# Patient Record
Sex: MALE | Race: WHITE
[De-identification: names, ages, dates, MRNs, and addresses within clinical notes are randomized per-mention and may not be internally consistent; named-entity substitution may affect disease eponyms.]

---

## 2020-03-12 ENCOUNTER — HOSPITAL ENCOUNTER (INPATIENT)
Dept: HOSPITAL 46 - ED | Age: 85
LOS: 5 days | Discharge: NURSING, REHAB OR LONG-TERM CARE | DRG: 291 | End: 2020-03-17
Attending: INTERNAL MEDICINE | Admitting: INTERNAL MEDICINE
Payer: MEDICARE

## 2020-03-12 VITALS — HEIGHT: 67 IN | WEIGHT: 186 LBS | BODY MASS INDEX: 29.19 KG/M2

## 2020-03-12 DIAGNOSIS — G47.33: ICD-10-CM

## 2020-03-12 DIAGNOSIS — G93.41: ICD-10-CM

## 2020-03-12 DIAGNOSIS — Z66: ICD-10-CM

## 2020-03-12 DIAGNOSIS — K59.00: ICD-10-CM

## 2020-03-12 DIAGNOSIS — J96.91: ICD-10-CM

## 2020-03-12 DIAGNOSIS — Z79.01: ICD-10-CM

## 2020-03-12 DIAGNOSIS — I48.19: ICD-10-CM

## 2020-03-12 DIAGNOSIS — E03.9: ICD-10-CM

## 2020-03-12 DIAGNOSIS — Z79.899: ICD-10-CM

## 2020-03-12 DIAGNOSIS — I50.33: Primary | ICD-10-CM

## 2020-03-13 NOTE — EKG
[facility]
                                    [address]
                                  [place]  [address]
_________________________________________________________________________________________
                                                                 Signed   
 
 
Atrial fibrillation with premature ventricular or aberrantly conducted complexes
Left axis deviation
Right bundle branch block
Inferior infarct , age undetermined
Abnormal ECG
No previous ECGs available
Confirmed by EDITH THOMAS MD ([id number]) on 3/13/2020 12:25:50 PM
 
 
 
 
 
 
 
 
 
 
 
 
 
 
 
 
 
 
 
 
 
 
 
 
 
 
 
 
 
 
 
 
 
 
 
 
 
 
    Electronically Signed By: EDITH THOMAS MD  03/13/20 1226
_________________________________________________________________________________________
PATIENT NAME:     ISRA FARIA                
MEDICAL RECORD #: [id number]                     Electrocardiogram             
          ACCT #: [id number]  
DATE OF BIRTH:   [birth year]                                       
PHYSICIAN:   EDITH THOMAS MD           REPORT #: [id number]
REPORT IS CONFIDENTIAL AND NOT TO BE RELEASED WITHOUT AUTHORIZATION

## 2020-06-03 ENCOUNTER — HOSPITAL ENCOUNTER (OUTPATIENT)
Dept: HOSPITAL 46 - ED | Age: 85
Setting detail: OBSERVATION
LOS: 2 days | Discharge: HOME | End: 2020-06-05
Attending: INTERNAL MEDICINE | Admitting: INTERNAL MEDICINE
Payer: MEDICARE

## 2020-06-03 VITALS — HEIGHT: 67 IN | WEIGHT: 217.09 LBS | BODY MASS INDEX: 34.07 KG/M2

## 2020-06-03 DIAGNOSIS — I21.4: ICD-10-CM

## 2020-06-03 DIAGNOSIS — N18.9: ICD-10-CM

## 2020-06-03 DIAGNOSIS — I50.32: ICD-10-CM

## 2020-06-03 DIAGNOSIS — I27.20: ICD-10-CM

## 2020-06-03 DIAGNOSIS — R00.1: Primary | ICD-10-CM

## 2020-06-03 DIAGNOSIS — I34.0: ICD-10-CM

## 2020-06-03 DIAGNOSIS — G47.33: ICD-10-CM

## 2020-06-03 DIAGNOSIS — E87.6: ICD-10-CM

## 2020-06-03 DIAGNOSIS — N17.9: ICD-10-CM

## 2020-06-03 DIAGNOSIS — Z79.01: ICD-10-CM

## 2020-06-03 DIAGNOSIS — I48.19: ICD-10-CM

## 2020-06-03 DIAGNOSIS — Z79.899: ICD-10-CM

## 2020-06-03 DIAGNOSIS — E03.9: ICD-10-CM

## 2020-06-03 PROCEDURE — U0002 COVID-19 LAB TEST NON-CDC: HCPCS

## 2020-06-03 PROCEDURE — G0378 HOSPITAL OBSERVATION PER HR: HCPCS

## 2020-06-03 NOTE — XMS
Encounter Summary
  Created on: 2020
 
 Isak Hilario
 External Reference #: [id number]
 : 30
 Sex: Male
 
 Demographics
 
 
+-----------------------+----------------------+
| Address               | [address]         |
|                       | [place]  [address] |
+-----------------------+----------------------+
| Home Phone            | [phone]      |
+-----------------------+----------------------+
| Preferred Language    | Unknown              |
+-----------------------+----------------------+
| Marital Status        |               |
+-----------------------+----------------------+
| Christianity Affiliation | Unknown              |
+-----------------------+----------------------+
| Race                  | Unknown              |
+-----------------------+----------------------+
| Ethnic Group          | Unknown              |
+-----------------------+----------------------+
 
 
 Author
 
 
+--------------+--------------------------------------------+
| Author       | [facility]  |
|              | and [place]                                |
+--------------+--------------------------------------------+
| Organization | [facility]  |
|              | and [place]                                |
+--------------+--------------------------------------------+
| Address      | Unknown                                    |
+--------------+--------------------------------------------+
| Phone        | Unavailable                                |
+--------------+--------------------------------------------+
 
 
 
 Support
 
 
+-------------+--------------+---------+-----------------+
| Name        | Relationship | Address | Phone           |
+-------------+--------------+---------+-----------------+
| Alessandra Saxena | ECON         | Unknown | [phone] |
+-------------+--------------+---------+-----------------+
 
 
 
 Care Team Providers
 
 
 
+-----------------------+------+-----------------+
| Care Team Member Name | Role | Phone           |
+-----------------------+------+-----------------+
| Bari Ha MD | PCP  | [phone] |
+-----------------------+------+-----------------+
 
 
 
 Reason for Visit
 
 
+-----------+----------+
| Reason    | Comments |
+-----------+----------+
| Follow-up |          |
+-----------+----------+
 
 
 
 Encounter Details
 
 
+--------+-----------+----------------------+----------------------+-------------+
| Date   | Type      | Department           | Care Team            | Description |
+--------+-----------+----------------------+----------------------+-------------+
| 10/13/ | Telephone |   [facility]      |   Kyree Smith PA  | Follow-up   |
| 2016   |           | SLEEP DISORDER  [address]  |  [address]     |             |
|        |           | [address]      | [place]      |             |
|        |           | [place] [address] | [address]  [phone]  |             |
|        |           |   [phone]       |  [phone] (Fax)  |             |
+--------+-----------+----------------------+----------------------+-------------+
 
 
 
 Social History
 
 
+--------------+-------+-----------+--------+------+
| Tobacco Use  | Types | Packs/Day | Years  | Date |
|              |       |           | Used   |      |
+--------------+-------+-----------+--------+------+
| Never Smoker |       |           |        |      |
+--------------+-------+-----------+--------+------+
 
 
 
+-------------+-------------+---------+----------+
| Alcohol Use | Drinks/Week | oz/Week | Comments |
+-------------+-------------+---------+----------+
| Not Asked   |             |         |          |
+-------------+-------------+---------+----------+
 
 
 
+------------------+---------------+
| Sex Assigned at  | Date Recorded |
| Birth            |               |
+------------------+---------------+
 
| Not on file      |               |
+------------------+---------------+
 
 
 
+----------------+-------------+-------------+
| Job Start Date | Occupation  | Industry    |
+----------------+-------------+-------------+
| Not on file    | Not on file | Not on file |
+----------------+-------------+-------------+
 
 
 
+----------------+--------------+------------+
| Travel History | Travel Start | Travel End |
+----------------+--------------+------------+
 
 
 
+-------------------------------------+
| No recent travel history available. |
+-------------------------------------+
 documented as of this encounter
 
 Plan of Treatment
 Not on filedocumented as of this encounter
 
 Visit Diagnoses
 Not on filedocumented in this encounter

## 2020-06-03 NOTE — XMS
Encounter Summary
  Created on: 2020
 
 Isak Hilario
 External Reference #: [id number]
 : 30
 Sex: Male
 
 Demographics
 
 
+-----------------------+------------------------+
| Address               | [address]       |
|                       | [place]  [address]   |
+-----------------------+------------------------+
| Home Phone            | [phone]        |
+-----------------------+------------------------+
| Preferred Language    | Unknown                |
+-----------------------+------------------------+
| Marital Status        |                 |
+-----------------------+------------------------+
| Faith Affiliation | NON                    |
+-----------------------+------------------------+
| Race                  | White                  |
+-----------------------+------------------------+
| Ethnic Group          | Not  or  |
+-----------------------+------------------------+
 
 
 Author
 
 
+--------------+------------------------------+
| Author       | [facility] |
+--------------+------------------------------+
| Organization | [facility] |
+--------------+------------------------------+
| Address      | Unknown                      |
+--------------+------------------------------+
| Phone        | Unavailable                  |
+--------------+------------------------------+
 
 
 
 Support
 
 
+-------------------+--------------+-------------------+-----------------+
| Name              | Relationship | Address           | Phone           |
+-------------------+--------------+-------------------+-----------------+
| Jackie Hilario | ECON         | [address]      | [phone] |
|                   |              | [place], [place]   |                 |
|                   |              | [address]             |                 |
+-------------------+--------------+-------------------+-----------------+
 
 
 
 Care Team Providers
 
 
 
+-----------------------+------+-------------+
| Care Team Member Name | Role | Phone       |
+-----------------------+------+-------------+
 PCP  | Unavailable |
+-----------------------+------+-------------+
 
 
 
 Encounter Details
 
 
+--------+-------------+---------------------+---------------------+---------------+
| Date   | Type        | Department          | Care Team           | Description   |
+--------+-------------+---------------------+---------------------+---------------+
| / | Office      |   General Internal  |   Note, Outpatient  | Progress Note |
|    | Visit-Trans | Medicine  [address]   | Clinic              |               |
|        | [address]      | [address]       |                     |               |
|        |             | Mailcode: L475      |                     |               |
|        |             | Outpatient Clinic   |                     |               |
|        |             | [facility] [address]      |                     |               |
|        |             | [place], [place]        |                     |               |
|        |             | [address]          |                     |               |
|        |             | [phone]        |                     |               |
+--------+-------------+---------------------+---------------------+---------------+
 
 
 
 Social History
 
 
+----------------+-------+-----------+--------+------+
| Tobacco Use    | Types | Packs/Day | Years  | Date |
|                |       |           | Used   |      |
+----------------+-------+-----------+--------+------+
| Never Assessed |       |           |        |      |
+----------------+-------+-----------+--------+------+
 
 
 
+------------------+---------------+
| Sex Assigned at  | Date Recorded |
| Birth            |               |
+------------------+---------------+
| Not on file      |               |
+------------------+---------------+
 
 
 
+----------------+-------------+-------------+
| Job Start Date | Occupation  | Industry    |
+----------------+-------------+-------------+
| Not on file    | Not on file | Not on file |
+----------------+-------------+-------------+
 
 
 
+----------------+--------------+------------+
| Travel History | Travel Start | Travel End |
 
+----------------+--------------+------------+
 
 
 
+-------------------------------------+
| No recent travel history available. |
+-------------------------------------+
 documented as of this encounter
 
 Progress Notes
 Interface, Transcription In - 2007  3:12 AM PST CLINIC DATE: 98
  
  UROLOGIC ONCOLOGY CLINIC
  
  The patient returns to undergo needle biopsy of the seminal vesicles after
  being seen previously this morning in consultation for high grade extensive
  adenocarcinoma of the prostate. After urinalysis showed no evidence of
  urinary tract infection, and the patient received an oral antibiotic
  consisting of 750 mg of Cipro, a transrectal ultrasound was performed. This
  demonstrated significant enlargement of the seminal vesicles but no evidence
  of tumor extension into the seminal vesicles or extension beyond the margins
  of the prostate. Biopsies were taken from the right seminal vesicle
  proximally in the area of its junction with the prostate and distally, with
  similar biopsies obtained from the left seminal vesicle. The patient
  tolerated the procedure well and was instructed to return for significant
  bleeding involving clots from the bladder or rectum, fever over 101 degrees,
  or any other significant problems. He was informed that full activity could
  be resumed within 24 hours and was to take another ciprofloxacin 750 mg
  tablet this evening. He will be informed of the results when they are
  available.
  
  
  
  [facility]
  Chief, Urologic Oncology
  , Surgery
  
  /[facility]
  D: 98
  T: 98
   Electronically signed by Interface, Transcription In at 2007  3:12 AM PSTdocumented
 in this encounter
 
 Plan of Treatment
 Not on filedocumented as of this encounter
 
 Visit Diagnoses
 Not on filedocumented in this encounter

## 2020-06-03 NOTE — XMS
Encounter Summary
  Created on: 2020
 
 Isak Hilario
 External Reference #: [id number]
 : 30
 Sex: Male
 
 Demographics
 
 
+-----------------------+------------------------+
| Address               | [address]       |
|                       | [place]  [address]   |
+-----------------------+------------------------+
| Home Phone            | [phone]        |
+-----------------------+------------------------+
| Preferred Language    | Unknown                |
+-----------------------+------------------------+
| Marital Status        |                 |
+-----------------------+------------------------+
| Jainism Affiliation | NON                    |
+-----------------------+------------------------+
| Race                  | White                  |
+-----------------------+------------------------+
| Ethnic Group          | Not  or  |
+-----------------------+------------------------+
 
 
 Author
 
 
+--------------+------------------------------+
| Author       | [facility] |
+--------------+------------------------------+
| Organization | [facility] |
+--------------+------------------------------+
| Address      | Unknown                      |
+--------------+------------------------------+
| Phone        | Unavailable                  |
+--------------+------------------------------+
 
 
 
 Support
 
 
+-------------------+--------------+-------------------+-----------------+
| Name              | Relationship | Address           | Phone           |
+-------------------+--------------+-------------------+-----------------+
| Jackie Hilario | ECON         | [address]      | [phone] |
|                   |              | [place], [place]   |                 |
|                   |              | [address]             |                 |
+-------------------+--------------+-------------------+-----------------+
 
 
 
 Care Team Providers
 
 
 
+-----------------------+------+-------------+
| Care Team Member Name | Role | Phone       |
+-----------------------+------+-------------+
 PCP  | Unavailable |
+-----------------------+------+-------------+
 
 
 
 Encounter Details
 
 
+--------+----------+----------------------+--------------------+-------------+
| Date   | Type     | Department           | Care Team          | Description |
+--------+----------+----------------------+--------------------+-------------+
| 05/05/ | Results  |   [facility]  [address]  |   [facility]   |             |
|    | Only     | [address]  | [phone]       |             |
|        |          |  [place]        |                    |             |
|        |          | [address]           |                    |             |
|        |          | [phone]         |                    |             |
+--------+----------+----------------------+--------------------+-------------+
 
 
 
 Social History
 
 
+----------------+-------+-----------+--------+------+
| Tobacco Use    | Types | Packs/Day | Years  | Date |
|                |       |           | Used   |      |
+----------------+-------+-----------+--------+------+
| Never Assessed |       |           |        |      |
+----------------+-------+-----------+--------+------+
 
 
 
+------------------+---------------+
| Sex Assigned at  | Date Recorded |
| Birth            |               |
+------------------+---------------+
| Not on file      |               |
+------------------+---------------+
 
 
 
+----------------+-------------+-------------+
| Job Start Date | Occupation  | Industry    |
+----------------+-------------+-------------+
| Not on file    | Not on file | Not on file |
+----------------+-------------+-------------+
 
 
 
+----------------+--------------+------------+
| Travel History | Travel Start | Travel End |
+----------------+--------------+------------+
 
 
 
 
+-------------------------------------+
| No recent travel history available. |
+-------------------------------------+
 documented as of this encounter
 
 Plan of Treatment
 Not on filedocumented as of this encounter
 
 Procedures
 
 
+--------------------+--------+------------+----------------------+----------------------+
| Procedure Name     | Priori | Date/Time  | Associated Diagnosis | Comments             |
|                    | ty     |            |                      |                      |
+--------------------+--------+------------+----------------------+----------------------+
| SURGICAL PATHOLOGY | Routin | 1998 |                      |   Results for this   |
|                    | e      |            |                      | procedure are in the |
|                    |        |            |                      |  results section.    |
+--------------------+--------+------------+----------------------+----------------------+
 documented in this encounter
 
 Results
 SURGICAL PATHOLOGY (1998)
 
+-----------+--------------------------+-----------+-------------+--------------+
| Component | Value                    | Ref Range | Performed   | Pathologist  |
|           |                          |           | At          | Signature    |
+-----------+--------------------------+-----------+-------------+--------------+
| SURGICAL  | SOURCE OF SPECIMEN: SEE  |           | [facility]        |              |
| PATHOLOGY | RESULTS                  |           | DEPARTMENT  |              |
|           | Preliminary              |           | OF          |              |
|           | History:GROSS            |           | PATHOLOGY   |              |
|           | DESCRIPTIONReceived from |           |             |              |
|           |  [place] [place]  |           |             |              |
|           | [place], [place], are six  |           |             |              |
|           | H&E-stainedslides        |           |             |              |
|           | labeled with the         |           |             |              |
|           | patient's name           |           |             |              |
|           | "Sulaiman". The           |           |             |              |
|           | correspondingPathology   |           |             |              |
|           | report is labeled with   |           |             |              |
|           | the patient's name and   |           |             |              |
|           | dated 98.           |           |             |              |
|           | MICROSCOPIC              |           |             |              |
|           | DESCRIPTIONProstate,     |           |             |              |
|           | Needle Core Biopsies,    |           |             |              |
|           | A-F: Low power           |           |             |              |
|           | demonstrates foci        |           |             |              |
|           | ofsmall, single glands   |           |             |              |
|           | compressed together with |           |             |              |
|           |  areas of cribriforming  |           |             |              |
|           | andareas of infiltrating |           |             |              |
|           |  fused glands. On high   |           |             |              |
|           | power, the malignant     |           |             |              |
|           | cellshave an increased   |           |             |              |
|           | nuclear to cytoplasmic   |           |             |              |
|           | ratio, prominent         |           |             |              |
|           | nucleoli,mildly          |           |             |              |
|           | irregular nuclear        |           |             |              |
|           | membranes, and           |           |             |              |
 
|           | pleomorphism. There      |           |             |              |
|           | areeosinophilic          |           |             |              |
|           | crystalloids in some of  |           |             |              |
|           | the malignant glands. In |           |             |              |
|           |  slide E,there is        |           |             |              |
|           | infiltration of a nerve  |           |             |              |
|           | by malignant glands. The |           |             |              |
|           |  remainder ofthe tissue  |           |             |              |
|           | demonstrates mild gland  |           |             |              |
|           | atrophy.       FINAL     |           |             |              |
|           | DIAGNOSISPROSTATE, LEFT  |           |             |              |
|           | BASE, NEEDLE CORE BIOPSY |           |             |              |
|           |  (A):   PROSTATE         |           |             |              |
|           | ADENOCARCINOMA,          |           |             |              |
|           | ALINE'S GRADE 3+4/5+5, |           |             |              |
|           |  COMPRISING 30% OF       |           |             |              |
|           |   BIOPSYPROSTATE, LEFT   |           |             |              |
|           | MID, NEEDLE CORE BIOPSY  |           |             |              |
|           | (B):   PROSTATE          |           |             |              |
|           | ADENOCARCINOMA,          |           |             |              |
|           | ALINE'S GRADE 3+4/5+5, |           |             |              |
|           |  COMPRISING 60% OF       |           |             |              |
|           |   BIOPSYPROSTATE, LEFT   |           |             |              |
|           | APEX, NEEDLE CORE BIOPSY |           |             |              |
|           |  (C):   BENIGN PROSTATE  |           |             |              |
|           | TISSUEPROSTATE, RIGHT    |           |             |              |
|           | BASE, NEEDLE CORE BIOPSY |           |             |              |
|           |  (D):   PROSTATE         |           |             |              |
|           | ADENOCARCINOMA,          |           |             |              |
|           | ALINE'S GRADE 3+4/5+5, |           |             |              |
|           |  COMPRISING 50% OF       |           |             |              |
|           |   BIOPSYPROSTATE, RIGHT  |           |             |              |
|           | MID, NEEDLE CORE BIOPSY  |           |             |              |
|           | (E):   PROSTATE          |           |             |              |
|           | ADENOCARCINOMA,          |           |             |              |
|           | ALINE'S GRADE 3+4/5+5, |           |             |              |
|           |  COMPRISING 10% OF       |           |             |              |
|           |   BIOPSY, INFILTRATING   |           |             |              |
|           | NERVEPROSTATE, RIGHT     |           |             |              |
|           | APEX, NEEDLE CORE BIOPSY |           |             |              |
|           |  (F):   BENIGN PROSTATE  |           |             |              |
|           | TISSUE(outside slides)   |           |             |              |
|           |       Case reviewed by:  |           |             |              |
|           |   Bhavana Leigh M.D.Case   |           |             |              |
|           | staffed by:   Harley      |           |             |              |
|           | Dex                 |           |             |              |
|           | M.D.T:98/fSix slides |           |             |              |
|           |  are returned with the   |           |             |              |
|           | report.       My         |           |             |              |
|           | electronic signature     |           |             |              |
|           | indicates that I have    |           |             |              |
|           | personally reviewed      |           |             |              |
|           | alldiagnostic slides,    |           |             |              |
|           | the gross and/or         |           |             |              |
|           | microscopic portion of   |           |             |              |
|           | thisreport and           |           |             |              |
|           | formulated the final     |           |             |              |
|           | diagnosis.               |           |             |              |
+-----------+--------------------------+-----------+-------------+--------------+
 
 
 
 
+----------+
| Specimen |
+----------+
| Other    |
+----------+
 
 
 
+----------------------+------------------------+----------------------+--------------+
| Performing           | Address                | City/State/Zipcode   | Phone Number |
| Organization         |                        |                      |              |
+----------------------+------------------------+----------------------+--------------+
|   [facility] |   [address]  |   [place] [address] |              |
|  PATHOLOGY           | [address]                |                      |              |
+----------------------+------------------------+----------------------+--------------+
 documented in this encounter
 
 Visit Diagnoses
 Not on filedocumented in this encounter

## 2020-06-03 NOTE — XMS
Encounter Summary
  Created on: 2020
 
 Isak Hilario
 External Reference #: [id number]
 : 30
 Sex: Male
 
 Demographics
 
 
+-----------------------+----------------------+
| Address               | [address]         |
|                       | [place]  [address] |
+-----------------------+----------------------+
| Home Phone            | [phone]      |
+-----------------------+----------------------+
| Preferred Language    | Unknown              |
+-----------------------+----------------------+
| Marital Status        |               |
+-----------------------+----------------------+
| Mosque Affiliation | Unknown              |
+-----------------------+----------------------+
| Race                  | Unknown              |
+-----------------------+----------------------+
| Ethnic Group          | Unknown              |
+-----------------------+----------------------+
 
 
 Author
 
 
+--------------+--------------------------------------------+
| Author       | [facility]  |
|              | and [place]                                |
+--------------+--------------------------------------------+
| Organization | [facility]  |
|              | and [place]                                |
+--------------+--------------------------------------------+
| Address      | Unknown                                    |
+--------------+--------------------------------------------+
| Phone        | Unavailable                                |
+--------------+--------------------------------------------+
 
 
 
 Support
 
 
+-------------+--------------+---------+-----------------+
| Name        | Relationship | Address | Phone           |
+-------------+--------------+---------+-----------------+
| Alessandra Saxena | ECON         | Unknown | [phone] |
+-------------+--------------+---------+-----------------+
 
 
 
 Care Team Providers
 
 
 
+-----------------------+------+-----------------+
| Care Team Member Name | Role | Phone           |
+-----------------------+------+-----------------+
| Bari Ha MD | PCP  | [phone] |
+-----------------------+------+-----------------+
 
 
 
 Reason for Visit
 
 
+-----------------+----------+
| Reason          | Comments |
+-----------------+----------+
| Anticoagulation |          |
+-----------------+----------+
 
 
 
 Encounter Details
 
 
+--------+-----------+----------------------+----------------------+----------------------+
| Date   | Type      | Department           | Care Team            | Description          |
+--------+-----------+----------------------+----------------------+----------------------+
| / | Off-Site  |   [facility] |   Jason Braga,   | Atrial fibrillation, |
| 2020   | Visit     |  CLINIC  [address]   | ARNP  [address]   |  unspecified type    |
|        |           | Street  [place], | [place], [place]      | (HCC) (Primary Dx);  |
|        |           |  [place] [address]       | [address]  [phone]  | Monitoring for       |
|        |           | [phone]         |  [phone] (Fax)  | long-term            |
|        |           |                      |                      | anticoagulant use;   |
|        |           |                      |                      | Non-healing surgical |
|        |           |                      |                      |  wound, initial      |
|        |           |                      |                      | encounter            |
+--------+-----------+----------------------+----------------------+----------------------+
 
 
 
 Social History
 
 
+--------------+-------+-----------+--------+------+
| Tobacco Use  | Types | Packs/Day | Years  | Date |
|              |       |           | Used   |      |
+--------------+-------+-----------+--------+------+
| Never Smoker |       |           |        |      |
+--------------+-------+-----------+--------+------+
 
 
 
+---------------------+------+---+----------+
| Smokeless Tobacco:  | Chew |   | Quit:    |
| Former User         |      |   | 19 |
|                     |      |   | 85       |
+---------------------+------+---+----------+
 
 
 
 
+-------------+-------------+---------+----------+
| Alcohol Use | Drinks/Week | oz/Week | Comments |
+-------------+-------------+---------+----------+
| Not Asked   |             |         |          |
+-------------+-------------+---------+----------+
 
 
 
+------------------+---------------+
| Sex Assigned at  | Date Recorded |
| Birth            |               |
+------------------+---------------+
| Not on file      |               |
+------------------+---------------+
 
 
 
+----------------+-------------+-------------+
| Job Start Date | Occupation  | Industry    |
+----------------+-------------+-------------+
| Not on file    | Not on file | Not on file |
+----------------+-------------+-------------+
 
 
 
+----------------+--------------+------------+
| Travel History | Travel Start | Travel End |
+----------------+--------------+------------+
 
 
 
+-------------------------------------+
| No recent travel history available. |
+-------------------------------------+
 documented as of this encounter
 
 Last Filed Vital Signs
 
 
+-------------------+---------------------+----------------------+----------+
| Vital Sign        | Reading             | Time Taken           | Comments |
+-------------------+---------------------+----------------------+----------+
| Blood Pressure    | 127/67              | 2020  7:36 AM  |          |
|                   |                     | PDT                  |          |
+-------------------+---------------------+----------------------+----------+
| Pulse             | 64                  | 2020  7:36 AM  |          |
|                   |                     | PDT                  |          |
+-------------------+---------------------+----------------------+----------+
| Temperature       | 36.2   C (97.2   F) | 2020  7:36 AM  |          |
|                   |                     | PDT                  |          |
+-------------------+---------------------+----------------------+----------+
| Respiratory Rate  | 20                  | 2020  7:36 AM  |          |
|                   |                     | PDT                  |          |
+-------------------+---------------------+----------------------+----------+
| Oxygen Saturation | 96%                 | 2020  7:36 AM  |          |
|                   |                     | PDT                  |          |
+-------------------+---------------------+----------------------+----------+
| Inhaled Oxygen    | -                   | -                    |          |
| Concentration     |                     |                      |          |
+-------------------+---------------------+----------------------+----------+
 
| Weight            | -                   | -                    |          |
+-------------------+---------------------+----------------------+----------+
| Height            | -                   | -                    |          |
+-------------------+---------------------+----------------------+----------+
| Body Mass Index   | -                   | -                    |          |
+-------------------+---------------------+----------------------+----------+
 documented in this encounter
 
 Progress Notes
 Jason Braga ARNP - 2020  6:30 AM [facility] 
 
 Patient Name: Isak Hilario | Age: 89 y.o. | : 1930 | Medical Record Number:[id number]
[id number] | Author: HANNAH Turner | Date of Encounter: 3/23/2020
 
 Subjective:
 HPI:
 Anticoagulation: Patient here for followup of chronic anticoagulation.
 Indication: No diagnosis found.
 
 Patient's medications, allergies, past medical, surgical, social and family histories were 
obtained and reviewed as appropriate.
 
 ROS:
 See HPI
 
 Objective:
 There were no vitals taken for this visit.
 
 Physical Exam 
 Constitutional: He appears well-developed and well-nourished. 
 Cardiovascular: Normal rate and regular rhythm. 
 Pulmonary/Chest: Effort normal. He has no wheezes. He has no rales. 
 Musculoskeletal: Normal range of motion.    
    General: No tenderness, deformity or edema. 
 Neurological: He is alert. 
 Skin: Skin is warm and dry. 
 
 Assessment/Plan:
 There are no diagnoses linked to this encounter.
 See Anticoagulation Summary above.
 
 Continue current warfarin schedule. [id number]
 
 Seen also today for non-healing biopsy site on right temple.  Wound measures 0.7 x 1 x 0.1 
cm with dry non-granulating wound bed.  Has excessive tissue dryness with scarring.  Wound c
are order changed to applying hydrogel twice a day with no dressing to prevent further dryin
g out of the wound bed and irritation.
 
 Electronically signed by:
 HANNAH Turner
 3/23/2020 at 7:37 AM
 
 Portions of this chart may have been created with Dragon voice recognition software. Occasi
onal wrong-word or   sound-alike  substitutions may have occurred due to the inherent fuentes
itations of voice recognition software. Please read the chart carefully and recognize, using
 context, where these substitutions have occurred.Electronically signed by JULIA Joy at 2020  7:46 AM PDTdocumented in this encounter
 
 Plan of Treatment
 Not on filedocumented as of this encounter
 
 
 Procedures
 
 
+----------------+--------+-------------+----------------------+----------------------+
| Procedure Name | Priori | Date/Time   | Associated Diagnosis | Comments             |
|                | ty     |             |                      |                      |
+----------------+--------+-------------+----------------------+----------------------+
| POC PT/INR     | Routin | 2020  |   Atrial             |   Results for this   |
| FINGERSTICK    | e      |  6:50 AM    | fibrillation,        | procedure are in the |
|                |        | PDT         | unspecified type     |  results section.    |
|                |        |             | ([facility])  Monitoring    |                      |
|                |        |             | for long-term        |                      |
|                |        |             | anticoagulant use    |                      |
+----------------+--------+-------------+----------------------+----------------------+
 documented in this encounter
 
 Results
 POCT PT/INR fingerstick (2020  6:50 AM PDT)
 
+-----------+---------+-----------+------------+--------------+
| Component | Value   | Ref Range | Performed  | Pathologist  |
|           |         |           | At         | Signature    |
+-----------+---------+-----------+------------+--------------+
| INR, POC  | 3.2 (A) | 0.9 - 1.2 |            |              |
+-----------+---------+-----------+------------+--------------+
 
 
 
+----------+
| Specimen |
+----------+
| Blood    |
+----------+
 documented in this encounter
 
 Visit Diagnoses
 
 
+-----------------------------------------------------------------------------------------+
| Diagnosis                                                                               |
+-----------------------------------------------------------------------------------------+
|   Atrial fibrillation, unspecified type (HCC) - Primary                                 |
+-----------------------------------------------------------------------------------------+
|   Monitoring for long-term anticoagulant use  Encounter for long-term (current) use of  |
| anticoagulants                                                                          |
+-----------------------------------------------------------------------------------------+
|   Non-healing surgical wound, initial encounter                                         |
+-----------------------------------------------------------------------------------------+
 documented in this encounter

## 2020-06-03 NOTE — XMS
Encounter Summary
  Created on: 2020
 
 Isak Hilario
 External Reference #: [id number]
 : 30
 Sex: Male
 
 Demographics
 
 
+-----------------------+----------------------+
| Address               | [address]         |
|                       | [place]  [address] |
+-----------------------+----------------------+
| Home Phone            | [phone]      |
+-----------------------+----------------------+
| Preferred Language    | Unknown              |
+-----------------------+----------------------+
| Marital Status        |               |
+-----------------------+----------------------+
| Hinduism Affiliation | Unknown              |
+-----------------------+----------------------+
| Race                  | Unknown              |
+-----------------------+----------------------+
| Ethnic Group          | Unknown              |
+-----------------------+----------------------+
 
 
 Author
 
 
+--------------+--------------------------------------------+
| Author       | [facility] [place]  |
|              | and [place]                                |
+--------------+--------------------------------------------+
| Organization | [facility] [place]  |
|              | and [place]                                |
+--------------+--------------------------------------------+
| Address      | Unknown                                    |
+--------------+--------------------------------------------+
| Phone        | Unavailable                                |
+--------------+--------------------------------------------+
 
 
 
 Support
 
 
+-------------+--------------+---------+-----------------+
| Name        | Relationship | Address | Phone           |
+-------------+--------------+---------+-----------------+
| Alessandra Saxena | ECON         | Unknown | [phone] |
+-------------+--------------+---------+-----------------+
 
 
 
 Care Team Providers
 
 
 
+-----------------------+------+-------------+
| Care Team Member Name | Role | Phone       |
+-----------------------+------+-------------+
 PCP  | Unavailable |
+-----------------------+------+-------------+
 
 
 
 Reason for Visit
 
 
+-------------------+----------+
| Reason            | Comments |
+-------------------+----------+
| Medication Refill |          |
+-------------------+----------+
 
 
 
 Encounter Details
 
 
+--------+--------+----------------------+----------------------+-------------------+
| Date   | Type   | Department           | Care Team            | Description       |
+--------+--------+----------------------+----------------------+-------------------+
| 10/ | Refill |   [facility]      |   Kyree Smith PA  | Medication Refill |
|    |        | SLEEP DISORDER  [address]  |  [address]     |                   |
|        |        | [address]  [place]      | [place]      |                   |
|        |        | [place] [address] | [address]  [phone]  |                   |
|        |        |   [phone]       |  [phone] (Fax)  |                   |
+--------+--------+----------------------+----------------------+-------------------+
 
 
 
 Social History
 
 
+----------------+-------+-----------+--------+------+
| Tobacco Use    | Types | Packs/Day | Years  | Date |
|                |       |           | Used   |      |
+----------------+-------+-----------+--------+------+
| Never Assessed |       |           |        |      |
+----------------+-------+-----------+--------+------+
 
 
 
+------------------+---------------+
| Sex Assigned at  | Date Recorded |
| Birth            |               |
+------------------+---------------+
| Not on file      |               |
+------------------+---------------+
 
 
 
+----------------+-------------+-------------+
| Job Start Date | Occupation  | Industry    |
+----------------+-------------+-------------+
 
| Not on file    | Not on file | Not on file |
+----------------+-------------+-------------+
 
 
 
+----------------+--------------+------------+
| Travel History | Travel Start | Travel End |
+----------------+--------------+------------+
 
 
 
+-------------------------------------+
| No recent travel history available. |
+-------------------------------------+
 documented as of this encounter
 
 Plan of Treatment
 Not on filedocumented as of this encounter
 
 Visit Diagnoses
 
 
+---------------------------------------------------------+
| Diagnosis                                               |
+---------------------------------------------------------+
|   Obstructive sleep apnea (adult) (pediatric) - Primary |
+---------------------------------------------------------+
 documented in this encounter

## 2020-06-03 NOTE — XMS
Encounter Summary
  Created on: 2020
 
 Isak Hilario
 External Reference #: [id number]
 : 30
 Sex: Male
 
 Demographics
 
 
+-----------------------+----------------------+
| Address               | [address]         |
|                       | [place]  [address] |
+-----------------------+----------------------+
| Home Phone            | [phone]      |
+-----------------------+----------------------+
| Preferred Language    | Unknown              |
+-----------------------+----------------------+
| Marital Status        |               |
+-----------------------+----------------------+
| Church Affiliation | Unknown              |
+-----------------------+----------------------+
| Race                  | Unknown              |
+-----------------------+----------------------+
| Ethnic Group          | Unknown              |
+-----------------------+----------------------+
 
 
 Author
 
 
+--------------+--------------------------------------------+
| Author       | [facility]  |
|              | and [place]                                |
+--------------+--------------------------------------------+
| Organization | [facility]  |
|              | and [place]                                |
+--------------+--------------------------------------------+
| Address      | Unknown                                    |
+--------------+--------------------------------------------+
| Phone        | Unavailable                                |
+--------------+--------------------------------------------+
 
 
 
 Support
 
 
+-------------+--------------+---------+-----------------+
| Name        | Relationship | Address | Phone           |
+-------------+--------------+---------+-----------------+
| Alessandra Saxena | ECON         | Unknown | [phone] |
+-------------+--------------+---------+-----------------+
 
 
 
 Care Team Providers
 
 
 
+-----------------------+------+-----------------+
| Care Team Member Name | Role | Phone           |
+-----------------------+------+-----------------+
| Bari Ha MD | PCP  | [phone] |
+-----------------------+------+-----------------+
 
 
 
 Reason for Visit
 
 
+----------------------+----------+
| Reason               | Comments |
+----------------------+----------+
| Coordination Of Care |          |
+----------------------+----------+
 
 
 
 Encounter Details
 
 
+--------+-----------+----------------------+--------------------+----------------------+
| Date   | Type      | Department           | Care Team          | Description          |
+--------+-----------+----------------------+--------------------+----------------------+
| / | Telephone |   [facility]  |   Peter Hess  | Coordination Of Care |
| 2020   |           | SURGERY  [address]   | [address]    |                      |
|        |           | St  [place], [place]  | [address]  [facility]    |                      |
|        |           | [address]           | [place] [address]    |                      |
|        |           | [phone]         | [phone]       |                      |
|        |           |                      | [phone] (Fax) |                      |
+--------+-----------+----------------------+--------------------+----------------------+
 
 
 
 Social History
 
 
+--------------+-------+-----------+--------+------+
| Tobacco Use  | Types | Packs/Day | Years  | Date |
|              |       |           | Used   |      |
+--------------+-------+-----------+--------+------+
| Never Smoker |       |           |        |      |
+--------------+-------+-----------+--------+------+
 
 
 
+---------------------+------+---+----------+
| Smokeless Tobacco:  | Chew |   | Quit:    |
| Former User         |      |   | 19 |
|                     |      |   | 85       |
+---------------------+------+---+----------+
 
 
 
+-------------+-------------+---------+----------+
| Alcohol Use | Drinks/Week | oz/Week | Comments |
+-------------+-------------+---------+----------+
 
| Not Asked   |             |         |          |
+-------------+-------------+---------+----------+
 
 
 
+------------------+---------------+
| Sex Assigned at  | Date Recorded |
| Birth            |               |
+------------------+---------------+
| Not on file      |               |
+------------------+---------------+
 
 
 
+----------------+-------------+-------------+
| Job Start Date | Occupation  | Industry    |
+----------------+-------------+-------------+
| Not on file    | Not on file | Not on file |
+----------------+-------------+-------------+
 
 
 
+----------------+--------------+------------+
| Travel History | Travel Start | Travel End |
+----------------+--------------+------------+
 
 
 
+-------------------------------------+
| No recent travel history available. |
+-------------------------------------+
 documented as of this encounter
 
 Plan of Treatment
 Not on filedocumented as of this encounter
 
 Visit Diagnoses
 Not on filedocumented in this encounter

## 2020-06-03 NOTE — XMS
Encounter Summary
  Created on: 2020
 
 Isak Hilario
 : 30
 Sex: Male
 
 Demographics
 
 
+-----------------------+------------------------+
| Address               | [address]       |
|                       | [place]  [address]   |
+-----------------------+------------------------+
| Home Phone            | [phone]        |
+-----------------------+------------------------+
| Preferred Language    | Unknown                |
+-----------------------+------------------------+
| Marital Status        |                 |
+-----------------------+------------------------+
| Orthodox Affiliation | NON                    |
+-----------------------+------------------------+
| Race                  | White                  |
+-----------------------+------------------------+
| Ethnic Group          | Not  or  |
+-----------------------+------------------------+
 
 
 Author
 
 
+--------------+-------------+
| Organization | Unknown     |
+--------------+-------------+
| Address      | Unknown     |
+--------------+-------------+
| Phone        | Unavailable |
+--------------+-------------+
 
 
 
 Support
 
 
+-------------------+--------------+-------------------+-----------------+
| Name              | Relationship | Address           | Phone           |
+-------------------+--------------+-------------------+-----------------+
| Jackie Hilario | ECON         | [address]      | [phone] |
|                   |              | [place], [place]   |                 |
|                   |              | [id number]             |                 |
+-------------------+--------------+-------------------+-----------------+
 
 
 
 Care Team Providers
 
 
+-----------------------+------+-------------+
 
| Care Team Member Name | Role | Phone       |
+-----------------------+------+-------------+
 PCP  | Unavailable |
+-----------------------+------+-------------+
 
 
 
 Encounter Details
 
 
+--------+----------+------------+--------------------+-------------+
| Date   | Type     | Department | Care Team          | Description |
+--------+----------+------------+--------------------+-------------+
| / | Results  |            |   Other, Faculty   |             |
|    | Only     |            | [phone]       |             |
+--------+----------+------------+--------------------+-------------+
 
 
 
 Social History
 
 
+----------------+-------+-----------+--------+------+
| Tobacco Use    | Types | Packs/Day | Years  | Date |
|                |       |           | Used   |      |
+----------------+-------+-----------+--------+------+
| Never Assessed |       |           |        |      |
+----------------+-------+-----------+--------+------+
 
 
 
+------------------+---------------+
| Sex Assigned at  | Date Recorded |
| Birth            |               |
+------------------+---------------+
| Not on file      |               |
+------------------+---------------+
 
 
 
+----------------+-------------+-------------+
| Job Start Date | Occupation  | Industry    |
+----------------+-------------+-------------+
| Not on file    | Not on file | Not on file |
+----------------+-------------+-------------+
 
 
 
+----------------+--------------+------------+
| Travel History | Travel Start | Travel End |
+----------------+--------------+------------+
 
 
 
+-------------------------------------+
| No recent travel history available. |
+-------------------------------------+
 documented as of this encounter
 
 Plan of Treatment
 
 Not on filedocumented as of this encounter
 
 Procedures
 
 
+--------------------+--------+-------------+----------------------+----------------------+
| Procedure Name     | Priori | Date/Time   | Associated Diagnosis | Comments             |
|                    | ty     |             |                      |                      |
+--------------------+--------+-------------+----------------------+----------------------+
| X-RAY CHEST 2 VIEW | Priori | 1998  |                      |   Results for this   |
|                    | ty     |  4:40 PM    |                      | procedure are in the |
|                    |        | PDT         |                      |  results section.    |
+--------------------+--------+-------------+----------------------+----------------------+
 documented in this encounter
 
 Results
 CHEST 2 VIEW (1998  4:40 PM PDT)
 
+-----------+--------------------------+-----------+------------+--------------+
| Component | Value                    | Ref Range | Performed  | Pathologist  |
|           |                          |           | At         | Signature    |
+-----------+--------------------------+-----------+------------+--------------+
| CHEST, 2  | Radiologist 1: [facility]   |           |            |              |
| [facility]  | [facility]IL[facility] ELMORE M.D.TWO VIEW |           |            |              |
| STEREO    |  CHEST:   98.      |           |            |              |
|           |   Dictated:   98   |           |            |              |
|           | COMPARISON:   No         |           |            |              |
|           | comparison. FINDINGS:    |           |            |              |
|           |   The lungs are clear.   |           |            |              |
|           |   The heart is normal in |           |            |              |
|           |  size andconfiguration.  |           |            |              |
|           |   Atherosclerotic        |           |            |              |
|           | changes are seen in the  |           |            |              |
|           | tortuousaorta.   No      |           |            |              |
|           | effusion is evident.     |           |            |              |
|           |   Degenerative changes   |           |            |              |
|           | are seen in thethoracic  |           |            |              |
|           | spine with mild anterior |           |            |              |
|           |  wedging of several      |           |            |              |
|           | midthoracicvertebral     |           |            |              |
|           | bodies.  IMPRESSION: No  |           |            |              |
|           | evidence of an acute     |           |            |              |
|           | cardiopulmonary process. |           |            |              |
|           |   END OF IMPRESSION:     |           |            |              |
+-----------+--------------------------+-----------+------------+--------------+
 
 
 
+----------+
| Specimen |
+----------+
|          |
+----------+
 
 
 
+---------------------------------+--------------+
| Narrative                       | Performed At |
+---------------------------------+--------------+
|   Ordered by HAWA ASHLEY M.D.    |              |
 
+---------------------------------+--------------+
 
 
 
+----------------------+---------+--------------------+--------------+
| Performing           | Address | City/State/Zipcode | Phone Number |
| Organization         |         |                    |              |
+----------------------+---------+--------------------+--------------+
|   [facility] DEPARTMENT OF |         |                    |              |
|  RADIOLOGY           |         |                    |              |
+----------------------+---------+--------------------+--------------+
 documented in this encounter
 
 Visit Diagnoses
 Not on filedocumented in this encounter

## 2020-06-03 NOTE — XMS
Encounter Summary
  Created on: 2020
 
 Isak Hilario
 External Reference #: [id number]
 : 30
 Sex: Male
 
 Demographics
 
 
+-----------------------+----------------------+
| Address               | [address]         |
|                       | [place]  [address] |
+-----------------------+----------------------+
| Home Phone            | [phone]      |
+-----------------------+----------------------+
| Preferred Language    | Unknown              |
+-----------------------+----------------------+
| Marital Status        |               |
+-----------------------+----------------------+
| Restoration Affiliation | Unknown              |
+-----------------------+----------------------+
| Race                  | Unknown              |
+-----------------------+----------------------+
| Ethnic Group          | Unknown              |
+-----------------------+----------------------+
 
 
 Author
 
 
+--------------+--------------------------------------------+
| Author       | [facility] [place]  |
|              | and [place]                                |
+--------------+--------------------------------------------+
| Organization | [facility] [place]  |
|              | and [place]                                |
+--------------+--------------------------------------------+
| Address      | Unknown                                    |
+--------------+--------------------------------------------+
| Phone        | Unavailable                                |
+--------------+--------------------------------------------+
 
 
 
 Support
 
 
+-------------+--------------+---------+-----------------+
| Name        | Relationship | Address | Phone           |
+-------------+--------------+---------+-----------------+
| Alessandra Saxena | ECON         | Unknown | [phone] |
+-------------+--------------+---------+-----------------+
 
 
 
 Care Team Providers
 
 
 
+-----------------------+------+-------------+
| Care Team Member Name | Role | Phone       |
+-----------------------+------+-------------+
 PCP  | Unavailable |
+-----------------------+------+-------------+
 
 
 
 Encounter Details
 
 
+--------+-----------+----------------------+-----------+-------------+
| Date   | Type      | Department           | Care Team | Description |
+--------+-----------+----------------------+-----------+-------------+
| / | Hospital  |   [facility] |           |             |
|    | Encounter |  [facility] SLE[facility]       |           |             |
|        |           | CENTER  [address] |           |             |
|        |           |   [place]    |           |             |
|        |           | [address]           |           |             |
|        |           | [phone]         |           |             |
+--------+-----------+----------------------+-----------+-------------+
 
 
 
 Social History
 
 
+----------------+-------+-----------+--------+------+
| Tobacco Use    | Types | Packs/Day | Years  | Date |
|                |       |           | Used   |      |
+----------------+-------+-----------+--------+------+
| Never Assessed |       |           |        |      |
+----------------+-------+-----------+--------+------+
 
 
 
+------------------+---------------+
| Sex Assigned at  | Date Recorded |
| Birth            |               |
+------------------+---------------+
| Not on file      |               |
+------------------+---------------+
 
 
 
+----------------+-------------+-------------+
| Job Start Date | Occupation  | Industry    |
+----------------+-------------+-------------+
| Not on file    | Not on file | Not on file |
+----------------+-------------+-------------+
 
 
 
+----------------+--------------+------------+
| Travel History | Travel Start | Travel End |
+----------------+--------------+------------+
 
 
 
 
+-------------------------------------+
| No recent travel history available. |
+-------------------------------------+
 documented as of this encounter
 
 Plan of Treatment
 Not on filedocumented as of this encounter
 
 Visit Diagnoses
 Not on filedocumented in this encounter

## 2020-06-03 NOTE — XMS
Encounter Summary
  Created on: 2020
 
 Isak Hilario
 External Reference #: [id number]
 : 30
 Sex: Male
 
 Demographics
 
 
+-----------------------+----------------------+
| Address               | [address]         |
|                       | [place]  [address] |
+-----------------------+----------------------+
| Home Phone            | [phone]      |
+-----------------------+----------------------+
| Preferred Language    | Unknown              |
+-----------------------+----------------------+
| Marital Status        |               |
+-----------------------+----------------------+
| Confucianism Affiliation | Unknown              |
+-----------------------+----------------------+
| Race                  | Unknown              |
+-----------------------+----------------------+
| Ethnic Group          | Unknown              |
+-----------------------+----------------------+
 
 
 Author
 
 
+--------------+--------------------------------------------+
| Author       | [facility] [place]  |
|              | and [place]                                |
+--------------+--------------------------------------------+
| Organization | [facility] [place]  |
|              | and [place]                                |
+--------------+--------------------------------------------+
| Address      | Unknown                                    |
+--------------+--------------------------------------------+
| Phone        | Unavailable                                |
+--------------+--------------------------------------------+
 
 
 
 Support
 
 
+-------------+--------------+---------+-----------------+
| Name        | Relationship | Address | Phone           |
+-------------+--------------+---------+-----------------+
| Alessandra Saxena | ECON         | Unknown | [phone] |
+-------------+--------------+---------+-----------------+
 
 
 
 Care Team Providers
 
 
 
+-----------------------+------+-------------+
| Care Team Member Name | Role | Phone       |
+-----------------------+------+-------------+
 PCP  | Unavailable |
+-----------------------+------+-------------+
 
 
 
 Encounter Details
 
 
+--------+-----------+----------------------+-----------+-------------+
| Date   | Type      | Department           | Care Team | Description |
+--------+-----------+----------------------+-----------+-------------+
| / | Hospital  |   [facility] |           |             |
|    | Encounter |  MED [facility] SLE[facility]       |           |             |
|        |           | CENTER  [address] |           |             |
|        |           |   [place]    |           |             |
|        |           | [address]           |           |             |
|        |           | [phone]         |           |             |
+--------+-----------+----------------------+-----------+-------------+
 
 
 
 Social History
 
 
+----------------+-------+-----------+--------+------+
| Tobacco Use    | Types | Packs/Day | Years  | Date |
|                |       |           | Used   |      |
+----------------+-------+-----------+--------+------+
| Never Assessed |       |           |        |      |
+----------------+-------+-----------+--------+------+
 
 
 
+------------------+---------------+
| Sex Assigned at  | Date Recorded |
| Birth            |               |
+------------------+---------------+
| Not on file      |               |
+------------------+---------------+
 
 
 
+----------------+-------------+-------------+
| Job Start Date | Occupation  | Industry    |
+----------------+-------------+-------------+
| Not on file    | Not on file | Not on file |
+----------------+-------------+-------------+
 
 
 
+----------------+--------------+------------+
| Travel History | Travel Start | Travel End |
+----------------+--------------+------------+
 
 
 
 
+-------------------------------------+
| No recent travel history available. |
+-------------------------------------+
 documented as of this encounter
 
 Plan of Treatment
 Not on filedocumented as of this encounter
 
 Visit Diagnoses
 Not on filedocumented in this encounter

## 2020-06-03 NOTE — XMS
Encounter Summary
  Created on: 2020
 
 Isak Hilario
 External Reference #: [id number]
 : 30
 Sex: Male
 
 Demographics
 
 
+-----------------------+----------------------+
| Address               | [address]         |
|                       | [place]  [address] |
+-----------------------+----------------------+
| Home Phone            | [phone]      |
+-----------------------+----------------------+
| Preferred Language    | Unknown              |
+-----------------------+----------------------+
| Marital Status        |               |
+-----------------------+----------------------+
| Restorationist Affiliation | Unknown              |
+-----------------------+----------------------+
| Race                  | Unknown              |
+-----------------------+----------------------+
| Ethnic Group          | Unknown              |
+-----------------------+----------------------+
 
 
 Author
 
 
+--------------+--------------------------------------------+
| Author       | [facility] [place]  |
|              | and [place]                                |
+--------------+--------------------------------------------+
| Organization | [facility] [place]  |
|              | and [place]                                |
+--------------+--------------------------------------------+
| Address      | Unknown                                    |
+--------------+--------------------------------------------+
| Phone        | Unavailable                                |
+--------------+--------------------------------------------+
 
 
 
 Support
 
 
+-------------+--------------+---------+-----------------+
| Name        | Relationship | Address | Phone           |
+-------------+--------------+---------+-----------------+
| Alessandra Saxena | ECON         | Unknown | [phone] |
+-------------+--------------+---------+-----------------+
 
 
 
 Care Team Providers
 
 
 
+-----------------------+------+-----------------+
| Care Team Member Name | Role | Phone           |
+-----------------------+------+-----------------+
| Bari Ha MD | PCP  | [phone] |
+-----------------------+------+-----------------+
 
 
 
 Encounter Details
 
 
+--------+----------+----------------------+--------------------+-------------+
| Date   | Type     | Department           | Care Team          | Description |
+--------+----------+----------------------+--------------------+-------------+
| / | Abstract |   [facility]  |   Peter Hess  |             |
| 2020   |          | SURGERY  [address]   | [address]    |             |
|        |          | St  [place]  | [place]  [facility]    |             |
|        |          | [address]           | [place] [address]    |             |
|        |          | [phone]         | [phone]       |             |
|        |          |                      | [phone] (Fax) |             |
+--------+----------+----------------------+--------------------+-------------+
 
 
 
 Social History
 
 
+--------------+-------+-----------+--------+------+
| Tobacco Use  | Types | Packs/Day | Years  | Date |
|              |       |           | Used   |      |
+--------------+-------+-----------+--------+------+
| Never Smoker |       |           |        |      |
+--------------+-------+-----------+--------+------+
 
 
 
+-------------+-------------+---------+----------+
| Alcohol Use | Drinks/Week | oz/Week | Comments |
+-------------+-------------+---------+----------+
| Not Asked   |             |         |          |
+-------------+-------------+---------+----------+
 
 
 
+------------------+---------------+
| Sex Assigned at  | Date Recorded |
| Birth            |               |
+------------------+---------------+
| Not on file      |               |
+------------------+---------------+
 
 
 
+----------------+-------------+-------------+
| Job Start Date | Occupation  | Industry    |
+----------------+-------------+-------------+
| Not on file    | Not on file | Not on file |
+----------------+-------------+-------------+
 
 
 
 
+----------------+--------------+------------+
| Travel History | Travel Start | Travel End |
+----------------+--------------+------------+
 
 
 
+-------------------------------------+
| No recent travel history available. |
+-------------------------------------+
 documented as of this encounter
 
 Plan of Treatment
 Not on filedocumented as of this encounter
 
 Visit Diagnoses
 Not on filedocumented in this encounter

## 2020-06-03 NOTE — XMS
Encounter Summary
  Created on: 2020
 
 Isak Hilario
 External Reference #: [id number]
 : 30
 Sex: Male
 
 Demographics
 
 
+-----------------------+----------------------+
| Address               | [address]         |
|                       | [place]  [address] |
+-----------------------+----------------------+
| Home Phone            | [phone]      |
+-----------------------+----------------------+
| Preferred Language    | Unknown              |
+-----------------------+----------------------+
| Marital Status        |               |
+-----------------------+----------------------+
| Mu-ism Affiliation | Unknown              |
+-----------------------+----------------------+
| Race                  | Unknown              |
+-----------------------+----------------------+
| Ethnic Group          | Unknown              |
+-----------------------+----------------------+
 
 
 Author
 
 
+--------------+--------------------------------------------+
| Author       | [facility]  |
|              | and [place]                                |
+--------------+--------------------------------------------+
| Organization | [facility]  |
|              | and [place]                                |
+--------------+--------------------------------------------+
| Address      | Unknown                                    |
+--------------+--------------------------------------------+
| Phone        | Unavailable                                |
+--------------+--------------------------------------------+
 
 
 
 Support
 
 
+-------------+--------------+---------+-----------------+
| Name        | Relationship | Address | Phone           |
+-------------+--------------+---------+-----------------+
| Alessandra Saxena | ECON         | Unknown | [phone] |
+-------------+--------------+---------+-----------------+
 
 
 
 Care Team Providers
 
 
 
+-----------------------+------+-----------------+
| Care Team Member Name | Role | Phone           |
+-----------------------+------+-----------------+
| Bari Ha MD | PCP  | [phone] |
+-----------------------+------+-----------------+
 
 
 
 Reason for Visit
 
 
+---------------+------------------+
| Reason        | Comments         |
+---------------+------------------+
| Carpal Tunnel | wants injections |
+---------------+------------------+
 
 
 
 Encounter Details
 
 
+--------+---------+---------------------+----------------------+--------------------+
| Date   | Type    | Department          | Care Team            | Description        |
+--------+---------+---------------------+----------------------+--------------------+
| / | Office  |   [facility]         |   Ed Patel   | Carpal tunnel      |
|    | Visit   | PHYSIATRY  [address]    | T, MD  [address]  | syndrome (Primary  |
|        |         | [address]   | [place]  [place]  | Dx)                |
|        |         | [place], [place]     | [address]  [phone]  |                    |
|        |         | [address]          |  [phone] (Fax)  |                    |
|        |         | [phone]        |                      |                    |
+--------+---------+---------------------+----------------------+--------------------+
 
 
 
 Social History
 
 
+--------------+-------+-----------+--------+------+
| Tobacco Use  | Types | Packs/Day | Years  | Date |
|              |       |           | Used   |      |
+--------------+-------+-----------+--------+------+
| Never Smoker |       |           |        |      |
+--------------+-------+-----------+--------+------+
 
 
 
+-------------+-------------+---------+----------+
| Alcohol Use | Drinks/Week | oz/Week | Comments |
+-------------+-------------+---------+----------+
| Not Asked   |             |         |          |
+-------------+-------------+---------+----------+
 
 
 
+------------------+---------------+
| Sex Assigned at  | Date Recorded |
| Birth            |               |
 
+------------------+---------------+
| Not on file      |               |
+------------------+---------------+
 
 
 
+----------------+-------------+-------------+
| Job Start Date | Occupation  | Industry    |
+----------------+-------------+-------------+
| Not on file    | Not on file | Not on file |
+----------------+-------------+-------------+
 
 
 
+----------------+--------------+------------+
| Travel History | Travel Start | Travel End |
+----------------+--------------+------------+
 
 
 
+-------------------------------------+
| No recent travel history available. |
+-------------------------------------+
 documented as of this encounter
 
 Last Filed Vital Signs
 
 
+-------------------+------------------+----------------------+----------+
| Vital Sign        | Reading          | Time Taken           | Comments |
+-------------------+------------------+----------------------+----------+
| Blood Pressure    | 147/74           | 2013 11:18 AM  |          |
|                   |                  | PST                  |          |
+-------------------+------------------+----------------------+----------+
| Pulse             | 85               | 2013 11:18 AM  |          |
|                   |                  | PST                  |          |
+-------------------+------------------+----------------------+----------+
| Temperature       | -                | -                    |          |
+-------------------+------------------+----------------------+----------+
| Respiratory Rate  | 14               | 2013 11:18 AM  |          |
|                   |                  | PST                  |          |
+-------------------+------------------+----------------------+----------+
| Oxygen Saturation | -                | -                    |          |
+-------------------+------------------+----------------------+----------+
| Inhaled Oxygen    | -                | -                    |          |
| Concentration     |                  |                      |          |
+-------------------+------------------+----------------------+----------+
| Weight            | 93 kg (205 lb)   | 2013 11:18 AM  |          |
|                   |                  | PST                  |          |
+-------------------+------------------+----------------------+----------+
| Height            | 170.2 cm (5' 7") | 2013 11:18 AM  |          |
|                   |                  | PST                  |          |
+-------------------+------------------+----------------------+----------+
| Body Mass Index   | 32.11            | 2013 11:18 AM  |          |
|                   |                  | PST                  |          |
+-------------------+------------------+----------------------+----------+
 documented in this encounter
 
 Progress Notes
 Ed Patel MD - 2013  1:41 PM PSTFormatting of this note might be different 
 
from the original.
 Subjective: 
  
 Patient ID: Isak Hilario is a 82 y.o. male.
 Chief Complaint 
 Patient presents with 
   Carpal Tunnel 
   wants injections 
 
 HPI The patient is being seen today in follow-up for complaints of bilateral wrist pain as 
well as numbness in the first three digits on both hands. The patient had prior carpal tunne
l releases in .  He reports that for several years after he had improvement of his sympt
oms.  The symptoms then started to reoccur approximately 3-4 years ago.  He was having signi
ficant neck pain at that time as well and x-rays and an MRI were ordered.  He was found to h
ave significant DDD and some cervical stenosis.  He then had EMG and nerve conduction studie
s by Dr. Smith Acuña which showed severe residual or recurrent carpal tunnel syndrome. He did
 not mention any evidence of cervical radiculopathy.  He did have some evidence of a periphe
ral neuropathy. After the EMG he was then referred to me for potential carpal tunnel injecti
ons.  I felt it would be in his best interest to try carpal tunnel splints first.  He did tr
y them and unfortunately they did not help.  He did receive carpal tunnel injections and his
 symptoms improved for several months.  Overall he was happy with the results and wishes to 
repeat the injections again today.
 He describes his symptoms as burning, uncomfortable numbness.  The discomfort is fairly con
stant and not necessarily activity related.  He does also have significant stiffness in a lo
t of his joints including his wrists. 
 
 Imaging of the cervical spine, including an MRI were reviewed today.  I did also personally
 review the EMG report from Dr. Acuña.
 
 IMPRESSION:
 
 1. Carpal tunnel syndrome  
 2. DDD cervical spine 
 3. Diabetes mellitus  
 4. Diabetic peripheral neuropathy  
 
 PLAN: A review of the EMG report by Dr. Nils Acuña and his response to the prior inject
ions would suggest to me that the recurrent or residual carpal tunnel syndrome seems to be t
he greatest source of the patient's current symptoms or at least a significant contributing 
source. The patient has benefited from carpal tunnel injections previously and the patient d
oes wish to pursue this option and consented to the procedure on both wrists today.  Potenti
al risks and alternative treatments were discussed in detail.
 
 Description of procedure: Bilateral carpal tunnel injections were performed using ultrasoun
d guidance.  
 
 After the patient gave consent for the procedure the area for the right wrist injection was
 located by direct ultrasound visualization.  Then the area was sterilized with chlorhexidin
e scrub and a sterile drape was applied. A sterile probe cover and sterile ultrasound gel wa
s applied to the probe.  Then under direct ultrasound guidance a 27-gauge 1-1/2 inch needle 
was inserted down into the carpal tunnel just deep to the median nerve.  A total volume of 2
 mL including 1 mL of triamcinolone ( 40 milligrams per mL) and 1 mL of 1% lidocaine was the
n infused. Pictures were taken to document needle placement.  The needle was then removed an
d the area was cleaned and bandaged. The procedure was then repeated in the same manner on t
he left.  The patient tolerated the procedure well. There were no complications.  The patien
t was instructed to ice the area for 15-20 minutes several times over the next few days and 
to watch for any signs of infection.
 
 The patient may be a candidate at some point for carpal tunnel release and I would be happy
 to provide a referral if he wishes to pursue that option.
 
 
 Electronically signed by Ed Patel MD at 2013  8:25 AM PSTdocumented in this
 encounter
 
 Plan of Treatment
 Not on filedocumented as of this encounter
 
 Visit Diagnoses
 
 
+------------------------------------+
| Diagnosis                          |
+------------------------------------+
|   Carpal tunnel syndrome - Primary |
+------------------------------------+
 documented in this encounter

## 2020-06-03 NOTE — XMS
Clinical Summary
  Created on: 2020
 
 Isak Hilario
 : 30
 Sex: Male
 
 Demographics
 
 
+-----------------------+------------------------+
| Address               | [address]       |
|                       | [place]  [address]   |
+-----------------------+------------------------+
| Home Phone            | [phone]        |
+-----------------------+------------------------+
| Preferred Language    | Unknown                |
+-----------------------+------------------------+
| Marital Status        |                 |
+-----------------------+------------------------+
| Congregational Affiliation | NON                    |
+-----------------------+------------------------+
| Race                  | White                  |
+-----------------------+------------------------+
| Ethnic Group          | Not  or  |
+-----------------------+------------------------+
 
 
 Author
 
 
+--------------+-------------+
| Organization | Unknown     |
+--------------+-------------+
| Address      | Unknown     |
+--------------+-------------+
| Phone        | Unavailable |
+--------------+-------------+
 
 
 
 Support
 
 
+-------------------+--------------+-------------------+-----------------+
| Name              | Relationship | Address           | Phone           |
+-------------------+--------------+-------------------+-----------------+
| Jackie Hilario | ECON         | [address]      | [phone] |
|                   |              | [place], [place]   |                 |
|                   |              | [id number]             |                 |
+-------------------+--------------+-------------------+-----------------+
 
 
 
 Care Team Providers
 
 
+-----------------------+------+-------------+
 
| Care Team Member Name | Role | Phone       |
+-----------------------+------+-------------+
 PCP  | Unavailable |
+-----------------------+------+-------------+
 
 
 
 Source Comments
 [facility] is fully live on both [facility] and [facility]
 
 Allergies
 No Known Allergies
 
 Medications
 Not on file
 
 Active Problems
 Not on file
 
 Social History
 
 
+----------------+-------+-----------+--------+------+
| Tobacco Use    | Types | Packs/Day | Years  | Date |
|                |       |           | Used   |      |
+----------------+-------+-----------+--------+------+
| Never Assessed |       |           |        |      |
+----------------+-------+-----------+--------+------+
 
 
 
+------------------+---------------+
| Sex Assigned at  | Date Recorded |
| Birth            |               |
+------------------+---------------+
| Not on file      |               |
+------------------+---------------+
 
 
 
+----------------+-------------+-------------+
| Job Start Date | Occupation  | Industry    |
+----------------+-------------+-------------+
| Not on file    | Not on file | Not on file |
+----------------+-------------+-------------+
 
 
 
+----------------+--------------+------------+
| Travel History | Travel Start | Travel End |
+----------------+--------------+------------+
 
 
 
+-------------------------------------+
| No recent travel history available. |
+-------------------------------------+
 
 
 
 
 Last Filed Vital Signs
 Not on file
 
 Plan of Treatment
 
 
+----------------------+-----------+-----------+----------+
| Health Maintenance   | Due Date  | Last Done | Comments |
+----------------------+-----------+-----------+----------+
| Pneumococcal         |  |           |          |
| vaccination (  | 5         |           |          |
| - PCV13)             |           |           |          |
+----------------------+-----------+-----------+----------+
| Influenza (Flu)      | 10/01/201 |           |          |
| vaccination (#1)     | 9         |           |          |
+----------------------+-----------+-----------+----------+
 
 
 
 Results
 Not on filefrom Last 3 Months

## 2020-06-03 NOTE — XMS
Encounter Summary
  Created on: 2020
 
 Isak Hilario
 External Reference #: [id number]
 : 30
 Sex: Male
 
 Demographics
 
 
+-----------------------+----------------------+
| Address               | [address]         |
|                       | [place]  [address] |
+-----------------------+----------------------+
| Home Phone            | [phone]      |
+-----------------------+----------------------+
| Preferred Language    | Unknown              |
+-----------------------+----------------------+
| Marital Status        |               |
+-----------------------+----------------------+
| Yazidi Affiliation | Unknown              |
+-----------------------+----------------------+
| Race                  | Unknown              |
+-----------------------+----------------------+
| Ethnic Group          | Unknown              |
+-----------------------+----------------------+
 
 
 Author
 
 
+--------------+--------------------------------------------+
| Author       | [facility]  |
|              | [facility] [place]                                |
+--------------+--------------------------------------------+
| Organization | [facility]  |
|              | [facility] [place]                                |
+--------------+--------------------------------------------+
| Address      | Unknown                                    |
+--------------+--------------------------------------------+
| Phone        | Unavailable                                |
+--------------+--------------------------------------------+
 
 
 
 Support
 
 
+-------------+--------------+---------+-----------------+
| Name        | Relationship | Address | Phone           |
+-------------+--------------+---------+-----------------+
| Alessandra Saxena | ECON         | Unknown | [phone] |
+-------------+--------------+---------+-----------------+
 
 
 
 Care Team Providers
 
 
 
+-----------------------+------+-----------------+
| Care Team Member Name | Role | Phone           |
+-----------------------+------+-----------------+
| Bari Ha MD | PCP  | [phone] |
+-----------------------+------+-----------------+
 
 
 
 Reason for Visit
 
 
+-------------+----------+
| Reason      | Comments |
+-------------+----------+
| New Patient |          |
+-------------+----------+
 Evaluate & Treat
 
+------------+--------+-----------+--------------+--------------+---------------+
| Status     | Reason | Specialty | Diagnoses /  | Referred By  | Referred To   |
|            |        |           | Procedures   | Contact      | Contact       |
+------------+--------+-----------+--------------+--------------+---------------+
| Authorized |        | Plastic   |   Diagnoses  |   Tomasz,     |   Jimena,    |
|            |        | Surgery   |  Forehead    | Mendoza Dexter,  | Peter        |
|            |        |           | wound        | MD  [address]  | [address]    |
|            |        |           | Exposed bone | [address]  | [address]  |
|            |        |           |  after       |  [place],  |  [place] LOZ[place], |
|            |        |           | resection of | OR           |  [place] [address]     |
|            |        |           |  basal cell  | [address]   | Phone:        |
|            |        |           | carcinoma    | Phone:       | [phone]  |
|            |        |           | Procedures   | [phone] |  Fax:         |
|            |        |           | [place] OFFICE    |   Fax:       | [phone]  |
|            |        |           | OUTPATIENT   | [phone] |               |
|            |        |           | NEW 60       |              |               |
|            |        |           | MINUTES      |              |               |
+------------+--------+-----------+--------------+--------------+---------------+
 
 
 
 
 Encounter Details
 
 
+--------+---------+----------------------+--------------------+----------------------+
| Date   | Type    | Department           | Care Team          | Description          |
+--------+---------+----------------------+--------------------+----------------------+
| / | Office  |   [facility]  |   Peter Hess  | Basal cell carcinoma |
| 2020   | Visit   | SURGERY  [address]   | [address]    |  (BCC) of scalp      |
|        |         | St  [place], [place]  | [address]  [place]    | (Primary Dx);        |
|        |         | [address]           | [place] [address]    | Hypertension,        |
|        |         | [phone]         | [phone]       | unspecified type     |
|        |         |                      | [phone] (Fax) |                      |
+--------+---------+----------------------+--------------------+----------------------+
 
 
 
 Social History
 
 
 
+--------------+-------+-----------+--------+------+
| Tobacco Use  | Types | Packs/Day | Years  | Date |
|              |       |           | Used   |      |
+--------------+-------+-----------+--------+------+
| Never Smoker |       |           |        |      |
+--------------+-------+-----------+--------+------+
 
 
 
+---------------------+------+---+----------+
| Smokeless Tobacco:  | Chew |   | Quit:    |
| Former User         |      |   | 19 |
|                     |      |   | 85       |
+---------------------+------+---+----------+
 
 
 
+-------------+-------------+---------+----------+
| Alcohol Use | Drinks/Week | oz/Week | Comments |
+-------------+-------------+---------+----------+
| Not Asked   |             |         |          |
+-------------+-------------+---------+----------+
 
 
 
+------------------+---------------+
| Sex Assigned at  | Date Recorded |
| Birth            |               |
+------------------+---------------+
| Not on file      |               |
+------------------+---------------+
 
 
 
+----------------+-------------+-------------+
| Job Start Date | Occupation  | Industry    |
+----------------+-------------+-------------+
| Not on file    | Not on file | Not on file |
+----------------+-------------+-------------+
 
 
 
+----------------+--------------+------------+
| Travel History | Travel Start | Travel End |
+----------------+--------------+------------+
 
 
 
+-------------------------------------+
| No recent travel history available. |
+-------------------------------------+
 documented as of this encounter
 
 Last Filed Vital Signs
 
 
+-------------------+----------------------+----------------------+----------+
| Vital Sign        | Reading              | Time Taken           | Comments |
+-------------------+----------------------+----------------------+----------+
 
| Blood Pressure    | 122/70               | 2020  1:05 PM  |          |
|                   |                      | PST                  |          |
+-------------------+----------------------+----------------------+----------+
| Pulse             | 70                   | 2020  1:05 PM  |          |
|                   |                      | PST                  |          |
+-------------------+----------------------+----------------------+----------+
| Temperature       | 36.9   C (98.4   F)  | 2020  1:05 PM  |          |
|                   |                      | PST                  |          |
+-------------------+----------------------+----------------------+----------+
| Respiratory Rate  | -                    | -                    |          |
+-------------------+----------------------+----------------------+----------+
| Oxygen Saturation | 97%                  | 2020  1:05 PM  |          |
|                   |                      | PST                  |          |
+-------------------+----------------------+----------------------+----------+
| Inhaled Oxygen    | -                    | -                    |          |
| Concentration     |                      |                      |          |
+-------------------+----------------------+----------------------+----------+
| Weight            | 91.9 kg (202 lb 9.6  | 2020  1:05 PM  |          |
|                   | oz)                  | PST                  |          |
+-------------------+----------------------+----------------------+----------+
| Height            | 170.2 cm (5' 7")     | 2020  1:05 PM  |          |
|                   |                      | PST                  |          |
+-------------------+----------------------+----------------------+----------+
| Body Mass Index   | 31.73                | 2020  1:05 PM  |          |
|                   |                      | PST                  |          |
+-------------------+----------------------+----------------------+----------+
 documented in this encounter
 
 Patient Instructions
 Patient Instructions Kourtney Espana RN - 2020  1:00 PM PST
 Take no Aspirin containing products for 7 days prior to surgery on 2020.
 Hold your Spironolactone on the morning of surgery.
 
 I will check with your provider regarding stopping your warfarin prior to surgery. I will c
ontact you with recommendations.
 
 DO NOT EAT OR DRINK ANYTHING EIGHT HOURS before your surgery.  This means no coffee, water,
 juice or toast on the morning of your surgery.  The only exception is essential medicines w
ith a small sip of water (or just enough to get the pills down safely).
 
 I have faxed the orders for your pre-op testing to [facility]. Please go by the
 hospital about one week prior to surgery and have the testing completed. 
 
 On 2020 check in at Same Day Surgery (corner of [address]) at 11:15 am.
 
 Your surgery is called Scalp wound debridement with full-thickness skin graft repair.
 It will start about 1:15 pm and will finish about 2:45 pm.
 You will be ready to go home Same Day.
 
 Anesthesia type recommended: Choice
 
 General IV Sedation with local anesthesia (MAC) IV Sedation Other
 
 MAKE ARRANGEMENTS FOR A RESPONSIBLE ADULT TO DRIVE YOU HOME.
 
 If you have any questions, please call at [phone].  
 Your nurse's name is Kourtney WESLEY.
 Your surgeon's name is Dr. Hess.
 
 I look forward to your having a safe, successful and comfortable surgery.
 
 
 Sign up for [employer] if you want easy access to your medical information online.  
 You can:
 Review your medications, immunizations, allergies and medical history.
 View details of your past and upcoming appointments.
 
 Sign up for [employer] if you want easy access to your medical information online.  Only you, 
your doctor and your health care team are permitted to view the information sent through [employer].  
 
 Through [employer] you can:
 ? Review your medications, immunizations, allergies and medical history.
 ? View details of your past and upcoming appointments.
 ? Receive test results online    no waiting for a phone call or letter
 ? Review health education topics and discharge instructions provided by your physician.
 ? Send secure emails to your healthcare team.
 ? Request renewals of your medications online
 ? Link your family  s accounts to yours for convenient access to appointments, immunizatio
n records, growth charts and more.  
 
 Below are the different ways you can sign up for [employer]:
 ? The first way is to get online at: [email or web address] and sign up directly throug
h the website prior to your appointment with us.  You can call [phone]/[phone] [phone] ([phone]
[phone] if you have any questions or need assistance.  
 ? The second way is through the [employer] nieves which can be accessed with any smart phone.  Ju
st go to your nieves store and look up [employer].
 ? The third way is to do it while you wait in the room for the doctor at your appointment. 
 The nurse is available if you have any questions or need assistance.
 
 You will need the following information to sign up:
 Email address: _________________________________________________________________
 User name: ____________________________________
 Password: _____________________________________
 Password must be at least 8 characters long, less than 20 characters, and it must have at l
east 1 upper case letter and 1 lower case letter as well as at least 1 number.
 Electronically signed by Kourtney Espana RN at 2020  1:55 PM PST
 documented in this encounter
 
 Progress Notes
 Peter Hess MD - 2020  1:00 PM PSTFormatting of this note might be differ
ent from the original.
  [facility]
 ADMIT HISTORY AND PHYSICAL
 
 Primary Care Physician:  Bari Ha
 
 PATIENT NAME: Isak Hilario     : 1930   
 
 TODAY'S DATE: 2020                  MRN: [id number]
 ________________________________________________________________________
 
 CHIEF COMPLAINT:  New Patient
  
 
 REASON FOR CONSULTATION:  Forehead wound Exposed bone after resection of basal cell carcino
ma
 
 History OF PRESENT ILLNESS: I was asked by Dr. Tolbert to consult on this patient for Plastic
 surgery.
 
 
 Isak Hilario is a 89 y.o. male with Forehead wound Exposed bone after resection of basal c
ell carcinoma.
 
 Patient had a large basel cell carcinoma on the right upper forehead and frontal scalp. Les
ion was excised 2019 with and  repaired with a bilateral advance flap. The flap crispin
led and now has a large defect with exposed bone. He is here to see about possible skin loc
ting.  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 
 Current dressing include Aquafore ointment, povidone iodine ointment, gauze pad, and padded
 bandage. Bandage is changed every few days. Denies noticeable pain, only when being cleaned
.  Patient is on a very low dose Warfarin  5 days a week for chronic A fib. 
 Patients daughters would like to know if the area would close on its own or if a skin graft
 is needed. 
 
 Opioid Risk Tool (ORT):  Total Score  0 (20 1855)
 (0 to 3 = Low risk: 6% chance of developing problematic behaviors, 4 to 7 = Moderate risk: 
28% chance of developing problematic behaviors, 8 or more = High risk: 90% chance of develop
ing problematic behaviors.)
 
 PAST MEDICAL HISTORY
 Past Medical History: 
 Diagnosis Date 
   Atrial fibrillation (HCC)  
   Congestive heart failure (CHF) (HCC)  
   Gout  
   Hyperlipidemia  
   Hypertension  
   Hypothyroidism  
   Impaired fasting glucose  
   Peripheral neuropathy  
   Prostate cancer (HCC)  
   Venous insufficiency  
   Vitamin D deficiency  
 
 PAST SURGICAL HISTORY
 Past Surgical History: 
 Procedure Laterality Date 
   CARPAL TUNNEL RELEASE   
  Bilateral Wrists 
   Cataract Right 2013 
   COLONOSCOPY   
   KNEE SURGERY Right 1965 
   PROSTATECTOMY   
 
 CURRENT MEDICATIONS
 Current Outpatient Medications 
 Medication Sig Dispense Refill 
   cholecalciferol (VITAMIN D-3) 400 UNITS TABS Take 400 Units by mouth Daily.   
   cyanocobalamin (VITAMIN B-12) 500 mcg tablet Take 500 mcg by mouth Daily.   
   dilTIAZem (CARDIZEM CD) 360 MG 24 hr capsule Take 360 mg by mouth Daily.   
   levothyroxine (SYNTHROID) 75 mcg tablet Take 75 mcg by mouth Daily.   
   spironolactone (ALDACTONE) 25 mg tablet Take 25 mg by mouth Daily.   
   warfarin (COUMADIN) 1 mg tablet Take 1 mg by mouth Daily.   
 
 No current facility-administered medications for this visit.  
 
 ALLERGIES
 No Known Allergies
 
 FAMILY HISTORY
 There is no known family history of premature coronary artery disease, CVA nor diabetes.
 
 
 SOCIAL HISTORY
 The pt  reports that he has never smoked. He quit smokeless tobacco use about 35 years ago.
  His smokeless tobacco use included chew.
 
 REVIEW OF SYSTEMS
 Review of Systems 
 Constitutional: Negative.  
 HENT: Negative.  
 Eyes: Negative.  
 Respiratory: Negative.  
 Cardiovascular: Negative.  
 Gastrointestinal: Negative.  
 Genitourinary: Negative.  
 Musculoskeletal: Negative.  
 Skin: Negative.  
 Neurological: Negative.  
 Endo/Heme/Allergies: Negative.  
 Psychiatric/Behavioral: Positive for memory loss. 
      Some memory loss 
  
 
 PHYSICAL EXAM
 /70  | Pulse 70  | Temp 36.9 C (98.4 F) (Temporal)  | Ht 1.702 m (5' 7")  | Wt 91
.9 kg (202 lb 9.6 oz)  | SpO2 97%  | BMI 31.73 kg/m 
 Wt. Admission: Weight: 91.9 kg (202 lb 9.6 oz)
 
 Physical Exam
 Constitutional:  
    Appearance: Normal appearance. 
 HENT: 
    Head: Normocephalic. 
 Eyes: 
    Extraocular Movements: Extraocular movements intact. 
    Pupils: Pupils are equal, round, and reactive to light. 
 Neck: 
    Musculoskeletal: Normal range of motion and neck supple. 
 Pulmonary: 
    Effort: Pulmonary effort is normal. 
    Breath sounds: Normal breath sounds. 
 Musculoskeletal: Normal range of motion. 
 Skin:
    General: Skin is warm and dry. 
    Comments: 6 x 3 cm wound, geographical shape, R anterior scalp and onto forehead.  1 x 2
 cm area of exposed dessicated bone in the mid portion.  Granulating tissue around the harman
n.  Contracted rolled skin margins.  Extensive actinic and seborrheic skin changes on the sc
alp.  The surrounding scalp is stiff and immobile. 
 Neurological: 
    General: No focal deficit present. 
    Mental Status: He is alert and oriented to person, place, and time. 
 Psychiatric:    
    Mood and Affect: Mood normal.    
    Behavior: Behavior normal. 
 
 ASSESSMENT & PLAN:  
 1. Basal cell carcinoma (BCC) of scalp
 
 Right anterior scalp and forehead wound secondary to BCC excision.  Mostly granulating tiss
ue but there is a limited area of exposed desiccated calvarial bone.  It would be difficult 
for the patient to perform a large flap procedure, but we may be able to skin graft the woun
 
d.  The exposed calvarium may be small enough that we can remove the outer table to expose t
he diploe and graft directly.  Or if it is felt to be too large at the time of surgery, the 
diploe can be covered with Integra for secondary closure or delayed grafting after revascula
rization.   
 The planned procedure, incision and resultant scar, graft donor site, risks and complicatio
ns, post op care, and possible need for additional surgery, was discussed.  He will need to 
hold coumadin prior to surgery.
 
 Electronically Signed by:  Peter Hess MD
 
 CC: Bari Ha MD, Mendoza Tolbert MD
 
 Electronically signed by Peter Hess MD at 2020  2:30 PM PSTdocumented in 
this encounter
 
 Plan of Treatment
 
 
+------------------+------+--------+----------------------+----------------------+
| Name             | Type | Priori | Associated Diagnoses | Order Schedule       |
|                  |      | ty     |                      |                      |
+------------------+------+--------+----------------------+----------------------+
| Basic Metabolic  | Lab  | Routin |   Basal cell         | 1 Occurrences        |
| Panel            |      | e      | carcinoma (BCC) of   | starting 2020  |
|                  |      |        | scalp                | until 2021     |
+------------------+------+--------+----------------------+----------------------+
| CBC with         | Lab  | Routin |   Basal cell         | 1 Occurrences        |
| Differential     |      | e      | carcinoma (BCC) of   | starting 2020  |
|                  |      |        | scalp                | until 2021     |
+------------------+------+--------+----------------------+----------------------+
| ECG 12 lead      | ECG  | Routin |   Hypertension,      | 1 Occurrences        |
|                  |      | e      | unspecified type     | starting 2020  |
|                  |      |        | Basal cell carcinoma | until 2021     |
|                  |      |        |  (BCC) of scalp      |                      |
+------------------+------+--------+----------------------+----------------------+
 documented as of this encounter
 
 Visit Diagnoses
 
 
+-------------------------------------------------+
| Diagnosis                                       |
+-------------------------------------------------+
|   Basal cell carcinoma (BCC) of scalp - Primary |
+-------------------------------------------------+
|   Hypertension, unspecified type                |
+-------------------------------------------------+
 documented in this encounter

## 2020-06-03 NOTE — XMS
Clinical Summary
  Created on: 2020
 
 Isak Hilario
 External Reference #: [id number]
 : 30
 Sex: Male
 
 Demographics
 
 
+-----------------------+----------------------+
| Address               | [address]         |
|                       | [place]  [address] |
+-----------------------+----------------------+
| Home Phone            | [phone]      |
+-----------------------+----------------------+
| Preferred Language    | Unknown              |
+-----------------------+----------------------+
| Marital Status        |               |
+-----------------------+----------------------+
| Rastafarian Affiliation | Unknown              |
+-----------------------+----------------------+
| Race                  | Unknown              |
+-----------------------+----------------------+
| Ethnic Group          | Unknown              |
+-----------------------+----------------------+
 
 
 Author
 
 
+--------------+--------------------------------------------+
| Author       | [facility]  |
|              | and [place]                                |
+--------------+--------------------------------------------+
| Organization | [facility]  |
|              | and [place]                                |
+--------------+--------------------------------------------+
| Address      | Unknown                                    |
+--------------+--------------------------------------------+
| Phone        | Unavailable                                |
+--------------+--------------------------------------------+
 
 
 
 Support
 
 
+-------------+--------------+---------+-----------------+
| Name        | Relationship | Address | Phone           |
+-------------+--------------+---------+-----------------+
| Alessandra Saxena | ECON         | Unknown | [phone] |
+-------------+--------------+---------+-----------------+
 
 
 
 Care Team Providers
 
 
 
+-----------------------+------+-----------------+
| Care Team Member Name | Role | Phone           |
+-----------------------+------+-----------------+
| Bari Ha MD | PCP  | [phone] |
+-----------------------+------+-----------------+
 
 
 
 Allergies
 No Known Allergies
 
 Medications
 
 
+----------------------+----------------------+-----------+---------+------+------+-------+
| Medication           | Sig                  | Dispensed | Refills | Star | End  | Statu |
|                      |                      |           |         | t    | Date | s     |
|                      |                      |           |         | Date |      |       |
+----------------------+----------------------+-----------+---------+------+------+-------+
|   cholecalciferol    | Take 400 Units by    |           | 0       | 09/1 |      | Activ |
| (VITAMIN D-3) 400    | mouth Daily.         |           |         | 4/20 |      | e     |
| UNITS TABS           |                      |           |         | 12   |      |       |
+----------------------+----------------------+-----------+---------+------+------+-------+
|   dilTIAZem          | Take 360 mg by mouth |           | 0       |      |      | Activ |
| (CARDIZEM CD) 360 MG |  Daily.              |           |         |      |      | e     |
|  24 hr capsule       |                      |           |         |      |      |       |
+----------------------+----------------------+-----------+---------+------+------+-------+
|   levothyroxine      | Take 75 mcg by mouth |           | 0       |      |      | Activ |
| (SYNTHROID) 75 mcg   |  Daily.              |           |         |      |      | e     |
| tablet               |                      |           |         |      |      |       |
+----------------------+----------------------+-----------+---------+------+------+-------+
|   spironolactone     | Take 25 mg by mouth  |           | 0       |      |      | Activ |
| (ALDACTONE) 25 mg    | Daily.               |           |         |      |      | e     |
| tablet               |                      |           |         |      |      |       |
+----------------------+----------------------+-----------+---------+------+------+-------+
|   warfarin           | Take 1 mg by mouth   |           | 0       |      |      | Activ |
| (COUMADIN) 1 mg      | Daily.               |           |         |      |      | e     |
| tablet               |                      |           |         |      |      |       |
+----------------------+----------------------+-----------+---------+------+------+-------+
|   cyanocobalamin     | Take 500 mcg by      |           | 0       |      |      | Activ |
| (VITAMIN B-12) 500   | mouth Daily.         |           |         |      |      | e     |
| mcg tablet           |                      |           |         |      |      |       |
+----------------------+----------------------+-----------+---------+------+------+-------+
 
 
 
 Active Problems
 
 
+-------------------------------------+------------+
| Problem                             | Noted Date |
+-------------------------------------+------------+
| Basal cell carcinoma (BCC) of scalp | 2020 |
+-------------------------------------+------------+
 
 
 
+-------------------------------------------------------------+
 
|   Overview:   Added automatically from request for surgery  |
| [id number]                                                     |
+-------------------------------------------------------------+
 
 
 
+-------------------------------------------+------------+
| CARPAL TUNNEL SYNDROME                    | 2012 |
+-------------------------------------------+------------+
| PARESTHESIA                               | 2012 |
+-------------------------------------------+------------+
| DEGENERATIVE DISC DISEASE, CERVICAL SPINE | 2012 |
+-------------------------------------------+------------+
| DIABETES MELLITUS                         | 2012 |
+-------------------------------------------+------------+
| DIABETIC PERIPHERAL NEUROPATHY            | 2012 |
+-------------------------------------------+------------+
| OBSTRUCTIVE SLEEP APNEA                   | 10/29/2010 |
+-------------------------------------------+------------+
 
 
 
 Encounters
 
 
+--------+-------------+-----------------+---------------------+----------------------+
| Date   | Type        | Specialty       | Care Team           | Description          |
+--------+-------------+-----------------+---------------------+----------------------+
| / | Off-Site    | Anticoagulation |   Jason Braga,  | Atrial fibrillation, |
|    | Visit       |                 | ARNP                |  unspecified type    |
|        |             |                 |                     | (HCC) (Primary Dx);  |
|        |             |                 |                     | Monitoring for       |
|        |             |                 |                     | long-term            |
|        |             |                 |                     | anticoagulant use;   |
|        |             |                 |                     | Non-healing surgical |
|        |             |                 |                     |  wound, initial      |
|        |             |                 |                     | encounter            |
+--------+-------------+-----------------+---------------------+----------------------+
| / | Lab         | Lab             |   [facility]    | Acute on chronic     |
|    | Requisition |                 | [facility]          | diastolic            |
|        |             |                 |                     | (congestive) heart   |
|        |             |                 |                     | failure (HCC);       |
|        |             |                 |                     | Metabolic            |
|        |             |                 |                     | encephalopathy;      |
|        |             |                 |                     | Essential (primary)  |
|        |             |                 |                     | hypertension         |
+--------+-------------+-----------------+---------------------+----------------------+
 from Last 3 Months
 
 Immunizations
 
 
+----------------------+------------------------------------+----------+
| Name                 | Administration Dates               | Next Due |
+----------------------+------------------------------------+----------+
| HEP A/HEP B, 3 DOSE  | 2006, 2005, 2005 |          |
| (ADULT)              |                                    |          |
+----------------------+------------------------------------+----------+
| INFLUENZA PF         | 2017, 10/18/2010             |          |
| TRIVALENT(PED/ADOL/A |                                    |          |
 
| DULT), PSKT          |                                    |          |
+----------------------+------------------------------------+----------+
| INFLUENZA TRIV       | 2011                         |          |
| W/PRES(PED/ADOL/ADUL |                                    |          |
| T),MULTIDOSE         |                                    |          |
+----------------------+------------------------------------+----------+
| INFLUENZA, E1G5-88,  | 2010                         |          |
| UNSPECIFIED          |                                    |          |
+----------------------+------------------------------------+----------+
| INFLUENZA,           | 2007                         |          |
| UNSPECIFIED          |                                    |          |
| FORMULATION          |                                    |          |
+----------------------+------------------------------------+----------+
| PNEUMOCOCCAL         | 2019                         |          |
| CONJUGATE 13-VALENT  |                                    |          |
| (PCV13)              |                                    |          |
+----------------------+------------------------------------+----------+
| TDAP, (ADOL/ADULT)   | 2012                         |          |
+----------------------+------------------------------------+----------+
| ZOSTER, 1 DOSE       | 2009                         |          |
| (ZOSTAVAX)           |                                    |          |
+----------------------+------------------------------------+----------+
 
 
 
 Social History
 
 
+--------------+-------+-----------+--------+------+
| Tobacco Use  | Types | Packs/Day | Years  | Date |
|              |       |           | Used   |      |
+--------------+-------+-----------+--------+------+
| Never Smoker |       |           |        |      |
+--------------+-------+-----------+--------+------+
 
 
 
+---------------------+------+---+----------+
| Smokeless Tobacco:  | Chew |   | Quit:    |
| Former User         |      |   | 19 |
|                     |      |   | 85       |
+---------------------+------+---+----------+
 
 
 
+-------------+-------------+---------+----------+
| Alcohol Use | Drinks/Week | oz/Week | Comments |
+-------------+-------------+---------+----------+
| Not Asked   |             |         |          |
+-------------+-------------+---------+----------+
 
 
 
+------------------+---------------+
| Sex Assigned at  | Date Recorded |
| Birth            |               |
+------------------+---------------+
| Not on file      |               |
+------------------+---------------+
 
 
 
 
+----------------+-------------+-------------+
| Job Start Date | Occupation  | Industry    |
+----------------+-------------+-------------+
| Not on file    | Not on file | Not on file |
+----------------+-------------+-------------+
 
 
 
+----------------+--------------+------------+
| Travel History | Travel Start | Travel End |
+----------------+--------------+------------+
 
 
 
+-------------------------------------+
| No recent travel history available. |
+-------------------------------------+
 
 
 
 Last Filed Vital Signs
 
 
+-------------------+----------------------+----------------------+----------+
| Vital Sign        | Reading              | Time Taken           | Comments |
+-------------------+----------------------+----------------------+----------+
| Blood Pressure    | 127/67               | 2020  7:36 AM  |          |
|                   |                      | PDT                  |          |
+-------------------+----------------------+----------------------+----------+
| Pulse             | 64                   | 2020  7:36 AM  |          |
|                   |                      | PDT                  |          |
+-------------------+----------------------+----------------------+----------+
| Temperature       | 36.2   C (97.2   F)  | 2020  7:36 AM  |          |
|                   |                      | PDT                  |          |
+-------------------+----------------------+----------------------+----------+
| Respiratory Rate  | 20                   | 2020  7:36 AM  |          |
|                   |                      | PDT                  |          |
+-------------------+----------------------+----------------------+----------+
| Oxygen Saturation | 96%                  | 2020  7:36 AM  |          |
|                   |                      | PDT                  |          |
+-------------------+----------------------+----------------------+----------+
| Inhaled Oxygen    | -                    | -                    |          |
| Concentration     |                      |                      |          |
+-------------------+----------------------+----------------------+----------+
| Weight            | 91.9 kg (202 lb 9.6  | 2020  1:05 PM  |          |
|                   | oz)                  | PST                  |          |
+-------------------+----------------------+----------------------+----------+
| Height            | 170.2 cm (5' 7")     | 2020  1:05 PM  |          |
|                   |                      | PST                  |          |
+-------------------+----------------------+----------------------+----------+
| Body Mass Index   | 31.73                | 2020  1:05 PM  |          |
|                   |                      | PST                  |          |
+-------------------+----------------------+----------------------+----------+
 
 
 
 Plan of Treatment
 
 
 
+----------------------+-----------+---------------------------------+----------+
| Health Maintenance   | Due Date  | Last Done                       | Comments |
+----------------------+-----------+---------------------------------+----------+
| Diabetic Eye Exam    |  |                                 |          |
|                      | 8         |                                 |          |
+----------------------+-----------+---------------------------------+----------+
| Diabetic Foot Exam   |  |                                 |          |
|                      | 8         |                                 |          |
+----------------------+-----------+---------------------------------+----------+
| Hemoglobin A1c       |  |                                 |          |
| Screening            | 8         |                                 |          |
+----------------------+-----------+---------------------------------+----------+
| Vaccine: Zoster (2   |  | 2009                      |          |
| of 3)                | 0         |                                 |          |
+----------------------+-----------+---------------------------------+----------+
| Adult Annual         |  |                                 |          |
| Wellness Visit       | 0         |                                 |          |
+----------------------+-----------+---------------------------------+----------+
| Microalbumin         |  |                                 |          |
| Screening            | 0         |                                 |          |
+----------------------+-----------+---------------------------------+----------+
| Vaccine:             |  | 2019                      |          |
| Pneumococcal 65+ (2  | 0         |                                 |          |
| of 2 - PPSV23)       |           |                                 |          |
+----------------------+-----------+---------------------------------+----------+
| Vaccine:             |  | 2012                      |          |
| Dtap/Tdap/Td (2 -    | 2         |                                 |          |
| Td)                  |           |                                 |          |
+----------------------+-----------+---------------------------------+----------+
| Vaccine: Influenza   | Completed | 2020, 2017,         |          |
|                      |           | 2011, Additional history  |          |
|                      |           | exists                          |          |
+----------------------+-----------+---------------------------------+----------+
 
 
 
 Procedures
 
 
+------------------+--------+-------------+----------------------+----------------------+
| Procedure Name   | Priori | Date/Time   | Associated Diagnosis | Comments             |
|                  | ty     |             |                      |                      |
+------------------+--------+-------------+----------------------+----------------------+
| BASIC METABOLIC  | Routin | 2020  |   Acute on chronic   |   Results for this   |
| PANEL            | e      |  1:30 PM    | diastolic            | procedure are in the |
|                  |        | PDT         | (congestive) heart   |  results section.    |
|                  |        |             | failure (HCC)        |                      |
|                  |        |             | Metabolic            |                      |
|                  |        |             | encephalopathy       |                      |
|                  |        |             | Essential (primary)  |                      |
|                  |        |             | hypertension         |                      |
+------------------+--------+-------------+----------------------+----------------------+
| POC PT/INR       | Routin | 2020  |   Atrial             |   Results for this   |
| FINGERSTICK      | e      |  6:50 AM    | fibrillation,        | procedure are in the |
|                  |        | PDT         | unspecified type     |  results section.    |
|                  |        |             | (HCC)  Monitoring    |                      |
|                  |        |             | for long-term        |                      |
|                  |        |             | anticoagulant use    |                      |
+------------------+--------+-------------+----------------------+----------------------+
 
 from Last 3 Months
 
 Results
 Basic Metabolic Panel (2020  1:30 PM PDT)
 
+-------------+--------------------------+-----------------+-------------+--------------+
| Component   | Value                    | Ref Range       | Performed   | Pathologist  |
|             |                          |                 | At          | Signature    |
+-------------+--------------------------+-----------------+-------------+--------------+
| Na          | 137                      | 136 - 145       | [facility]  |              |
|             |                          | mmol/L          | [facility]    |              |
|             |                          |                 | [facility]     |              |
|             |                          |                 | [facility]    |              |
|             |                          |                 | LABORATORY  |              |
+-------------+--------------------------+-----------------+-------------+--------------+
| K           | 3.1 (L)                  | 3.4 - 5.1       | [facility]  |              |
|             |                          | mmol/L          | [facility]    |              |
|             |                          |                 | [facility]     |              |
|             |                          |                 | [facility] -    |              |
|             |                          |                 | LABORATORY  |              |
+-------------+--------------------------+-----------------+-------------+--------------+
| Cl          | 94 (L)                   | 98 - 107 mmol/L | [facility]  |              |
|             |                          |                 | [facility]    |              |
|             |                          |                 | [facility]     |              |
|             |                          |                 | [facility] -    |              |
|             |                          |                 | LABORATORY  |              |
+-------------+--------------------------+-----------------+-------------+--------------+
| CO2         | 34 (H)                   | 20 - 31 mmol/L  | [facility]  |              |
|             |                          |                 | [facility]    |              |
|             |                          |                 | [facility]     |              |
|             |                          |                 | [facility] -    |              |
|             |                          |                 | LABORATORY  |              |
+-------------+--------------------------+-----------------+-------------+--------------+
| Anion Gap   | 9                        | 3 - 16 mmol/L   | [facility]  |              |
|             |                          |                 | [facility]    |              |
|             |                          |                 | [facility]     |              |
|             |                          |                 | [facility] -    |              |
|             |                          |                 | LABORATORY  |              |
+-------------+--------------------------+-----------------+-------------+--------------+
| Glucose     | 159 (H)                  | 60 - 106 mg/dL  | [facility]  |              |
|             |                          |                 | [facility]    |              |
|             |                          |                 | [facility]     |              |
|             |                          |                 | [facility]    |              |
|             |                          |                 | LAB[facility]ORY  |              |
+-------------+--------------------------+-----------------+-------------+--------------+
| BUN         | 37 (H)                   | 9 - 23 mg/dL    | [facility]  |              |
|             |                          |                 | [facility]    |              |
|             |                          |                 | [facility]     |              |
|             |                          |                 | [facility] -    |              |
|             |                          |                 | LABORATORY  |              |
+-------------+--------------------------+-----------------+-------------+--------------+
| Creatinine  | 1.30                     | 0.70 - 1.30     | [facility]  |              |
|             |                          | mg/dL           |  [facility]    |              |
|             |                          |                 | [facility]     |              |
|             |                          |                 | [facility] -    |              |
|             |                          |                 | LABORATORY  |              |
+-------------+--------------------------+-----------------+-------------+--------------+
| eGFR if not | 52 (L)Comment:           | >=60            | [facility]  |              |
|      | GLOMERULAR FILTRATION    | mL/min/1.73m2   | [facility]    |              |
| [employer]    | RATE,ESTIMATED           |                 | MEDICAL     |              |
 
|             |   mL/min/1.00t6Vril than |                 | CENTER -    |              |
|             |  60    Chronic kidney    |                 | LABORATORY  |              |
|             | disease,if found over a  |                 |             |              |
|             | 3-month period.Less than |                 |             |              |
|             |  15    Kidney failureFor |                 |             |              |
|             |                   |                 |             |              |
|             | Americans,multiply the   |                 |             |              |
|             | calculated GFR by 1.21.  |                 |             |              |
|             |                          |                 |             |              |
+-------------+--------------------------+-----------------+-------------+--------------+
| Calcium     | 9.6                      | 8.7 - 10.4      | [facility]  |              |
|             |                          | mg/dL           |  [facility]    |              |
|             |                          |                 | [facility]     |              |
|             |                          |                 | [facility] -    |              |
|             |                          |                 | LABORATORY  |              |
+-------------+--------------------------+-----------------+-------------+--------------+
| BUN/Creatin | 28.5                     |                 | [facility]  |              |
| ine Ratio   |                          |                 | [facility]    |              |
|             |                          |                 | [facility]     |              |
|             |                          |                 | [facility] -    |              |
|             |                          |                 | LABORATORY  |              |
+-------------+--------------------------+-----------------+-------------+--------------+
 
 
 
+----------+
| Specimen |
+----------+
| Blood    |
+----------+
 
 
 
+----------------------+--------------------+--------------------+----------------+
| Performing           | Address            | City/State/Zipcode | Phone Number   |
| Organization         |                    |                    |                |
+----------------------+--------------------+--------------------+----------------+
|   [facility]     |   [address] |   [place]  |   [phone] |
| [facility]  |                    | [id number]              |                |
| - LABORATORY         |                    |                    |                |
+----------------------+--------------------+--------------------+----------------+
 POCT PT/INR fingerstick (2020  6:50 AM PDT)
 
+-----------+---------+-----------+------------+--------------+
| Component | Value   | Ref Range | Performed  | Pathologist  |
|           |         |           | At         | Signature    |
+-----------+---------+-----------+------------+--------------+
| INR, POC  | 3.2 (A) | 0.9 - 1.2 |            |              |
+-----------+---------+-----------+------------+--------------+
 
 
 
+----------+
| Specimen |
+----------+
| Blood    |
+----------+
 from Last 3 Months
 
 Insurance
 
 
 
+----------+--------+-------------+--------+-------------+------------+--------+
| Payer    | Benefi | Subscriber  | Effect | Phone       | Address    | Type   |
|          | t Plan | ID          | meena    |             |            |        |
|          |  /     |             | Dates  |             |            |        |
|          | Group  |             |        |             |            |        |
+----------+--------+-------------+--------+-------------+------------+--------+
| MEDICARE | MEDICA | [id number] | 20 | [phone] |            | [employer] |
|          | RE     |             | 07-Pre | 5           |            | re     |
|          | PART A |             | sent   |             |            |        |
|          |  AND B |             |        |             |            |        |
+----------+--------+-------------+--------+-------------+------------+--------+
| MODA     | MODA   | [id number]   | 3/1/20 | [phone] |   [address]   | Indemn |
|          | HEALTH |             | 08-Pre | 9           | [id number]      | [place]    |
|          |  MDCR  |             | sent   |             | [place],  |        |
|          | [employer]  |             |        |             | [place] [address]   |        |
+----------+--------+-------------+--------+-------------+------------+--------+
 
 
 
+----------------+--------+-------------+--------+-------------+---------------------+
| Guarantor Name | Accoun | Relation to | Date   | Phone       | Billing Address     |
|                | t Type |  Patient    | of     |             |                     |
|                |        |             | Birth  |             |                     |
+----------------+--------+-------------+--------+-------------+---------------------+
| ClarenceIsak rey  | Person | Self        | / |             |   [address]      |
|                | al/Fam |             | 1930   | [phone] | [place] [address] |
|                | jacek    |             |        | [address] (Home)    |                     |
+----------------+--------+-------------+--------+-------------+---------------------+
 
 
 
 Advance Directives
 
 
+-----------+---------------+----------------+-------------+
| Type      | Date Recorded | Patient        | Explanation |
|           |               | Representative |             |
+-----------+---------------+----------------+-------------+
| Power of  |               |                |             |
|   |               |                |             |
+-----------+---------------+----------------+-------------+
| Advance   |               |                |             |
| Directive |               |                |             |
+-----------+---------------+----------------+-------------+

## 2020-06-03 NOTE — XMS
Clinical Summary
  Created on: 2020
 
 Isak Hilario
 External Reference #: [id number]
 : 30
 Sex: Male
 
 Demographics
 
 
+-----------------------+----------------------+
| Address               | [address]         |
|                       | [place]  [address] |
+-----------------------+----------------------+
| Home Phone            | [phone]      |
+-----------------------+----------------------+
| Preferred Language    | Unknown              |
+-----------------------+----------------------+
| Marital Status        |               |
+-----------------------+----------------------+
| Protestant Affiliation | Unknown              |
+-----------------------+----------------------+
| Race                  | Unknown              |
+-----------------------+----------------------+
| Ethnic Group          | Unknown              |
+-----------------------+----------------------+
 
 
 Author
 
 
+--------------+--------------------------------------------+
| Author       | [facility]  |
|              | and [place]                                |
+--------------+--------------------------------------------+
| Organization | [facility]  |
|              | and [place]                                |
+--------------+--------------------------------------------+
| Address      | Unknown                                    |
+--------------+--------------------------------------------+
| Phone        | Unavailable                                |
+--------------+--------------------------------------------+
 
 
 
 Support
 
 
+-------------+--------------+---------+-----------------+
| Name        | Relationship | Address | Phone           |
+-------------+--------------+---------+-----------------+
| Alessandra Saxena | ECON         | Unknown | [phone] |
+-------------+--------------+---------+-----------------+
 
 
 
 Care Team Providers
 
 
 
+-----------------------+------+-----------------+
| Care Team Member Name | Role | Phone           |
+-----------------------+------+-----------------+
| Bari Ha MD | PCP  | [phone] |
+-----------------------+------+-----------------+
 
 
 
 Allergies
 No Known Allergies
 
 Medications
 
 
+----------------------+----------------------+-----------+---------+------+------+-------+
| Medication           | Sig                  | Dispensed | Refills | Star | End  | Statu |
|                      |                      |           |         | t    | Date | s     |
|                      |                      |           |         | Date |      |       |
+----------------------+----------------------+-----------+---------+------+------+-------+
|   cholecalciferol    | Take 400 Units by    |           | 0       | 09/1 |      | Activ |
| (VITAMIN D-3) 400    | mouth Daily.         |           |         | 4/20 |      | e     |
| UNITS TABS           |                      |           |         | 12   |      |       |
+----------------------+----------------------+-----------+---------+------+------+-------+
|   dilTIAZem          | Take 360 mg by mouth |           | 0       |      |      | Activ |
| (CARDIZEM CD) 360 MG |  Daily.              |           |         |      |      | e     |
|  24 hr capsule       |                      |           |         |      |      |       |
+----------------------+----------------------+-----------+---------+------+------+-------+
|   levothyroxine      | Take 75 mcg by mouth |           | 0       |      |      | Activ |
| (SYNTHROID) 75 mcg   |  Daily.              |           |         |      |      | e     |
| tablet               |                      |           |         |      |      |       |
+----------------------+----------------------+-----------+---------+------+------+-------+
|   spironolactone     | Take 25 mg by mouth  |           | 0       |      |      | Activ |
| (ALDACTONE) 25 mg    | Daily.               |           |         |      |      | e     |
| tablet               |                      |           |         |      |      |       |
+----------------------+----------------------+-----------+---------+------+------+-------+
|   warfarin           | Take 1 mg by mouth   |           | 0       |      |      | Activ |
| (COUMADIN) 1 mg      | Daily.               |           |         |      |      | e     |
| tablet               |                      |           |         |      |      |       |
+----------------------+----------------------+-----------+---------+------+------+-------+
|   cyanocobalamin     | Take 500 mcg by      |           | 0       |      |      | Activ |
| (VITAMIN B-12) 500   | mouth Daily.         |           |         |      |      | e     |
| mcg tablet           |                      |           |         |      |      |       |
+----------------------+----------------------+-----------+---------+------+------+-------+
 
 
 
 Active Problems
 
 
+-------------------------------------+------------+
| Problem                             | Noted Date |
+-------------------------------------+------------+
| Basal cell carcinoma (BCC) of scalp | 2020 |
+-------------------------------------+------------+
 
 
 
+-------------------------------------------------------------+
 
|   Overview:   Added automatically from request for surgery  |
| [id number]                                                     |
+-------------------------------------------------------------+
 
 
 
+-------------------------------------------+------------+
| CARPAL TUNNEL SYNDROME                    | 2012 |
+-------------------------------------------+------------+
| PARESTHESIA                               | 2012 |
+-------------------------------------------+------------+
| DEGENERATIVE DISC DISEASE, CERVICAL SPINE | 2012 |
+-------------------------------------------+------------+
| DIABETES MELLITUS                         | 2012 |
+-------------------------------------------+------------+
| DIABETIC PERIPHERAL NEUROPATHY            | 2012 |
+-------------------------------------------+------------+
| OBSTRUCTIVE SLEEP APNEA                   | 10/29/2010 |
+-------------------------------------------+------------+
 
 
 
 Encounters
 
 
+--------+-------------+-----------------+---------------------+----------------------+
| Date   | Type        | Specialty       | Care Team           | Description          |
+--------+-------------+-----------------+---------------------+----------------------+
| / | Off-Site    | Anticoagulation |   Jason Braga,  | Atrial fibrillation, |
|    | Visit       |                 | ARNP                |  unspecified type    |
|        |             |                 |                     | (HCC) (Primary Dx);  |
|        |             |                 |                     | Monitoring for       |
|        |             |                 |                     | long-term            |
|        |             |                 |                     | anticoagulant use;   |
|        |             |                 |                     | Non-healing surgical |
|        |             |                 |                     |  wound, initial      |
|        |             |                 |                     | encounter            |
+--------+-------------+-----------------+---------------------+----------------------+
| / | Lab         | Lab             |   [facility]    | Acute on chronic     |
|    | Requisition |                 | MD [facility]          | diastolic            |
|        |             |                 |                     | (congestive) heart   |
|        |             |                 |                     | failure (HCC);       |
|        |             |                 |                     | Metabolic            |
|        |             |                 |                     | encephalopathy;      |
|        |             |                 |                     | Essential (primary)  |
|        |             |                 |                     | hypertension         |
+--------+-------------+-----------------+---------------------+----------------------+
 from Last 3 Months
 
 Immunizations
 
 
+----------------------+------------------------------------+----------+
| Name                 | Administration Dates               | Next Due |
+----------------------+------------------------------------+----------+
| HEP A/HEP B, 3 DOSE  | 2006, 2005, 2005 |          |
| (ADULT)              |                                    |          |
+----------------------+------------------------------------+----------+
| INFLUENZA PF         | 2017, 10/18/2010             |          |
| TRIVALENT(PED/ADOL/A |                                    |          |
 
| DULT), PSKT          |                                    |          |
+----------------------+------------------------------------+----------+
| INFLUENZA TRIV       | 2011                         |          |
| W/PRES(PED/ADOL/ADUL |                                    |          |
| T),MULTIDOSE         |                                    |          |
+----------------------+------------------------------------+----------+
| INFLUENZA, [id number],  | 2010                         |          |
| UNSPECIFIED          |                                    |          |
+----------------------+------------------------------------+----------+
| INFLUENZA,           | 2007                         |          |
| UNSPECIFIED          |                                    |          |
| FORMULATION          |                                    |          |
+----------------------+------------------------------------+----------+
| PNEUMOCOCCAL         | 2019                         |          |
| CONJUGATE 13-VALENT  |                                    |          |
| (PCV13)              |                                    |          |
+----------------------+------------------------------------+----------+
| TDAP, (ADOL/ADULT)   | 2012                         |          |
+----------------------+------------------------------------+----------+
| ZOSTER, 1 DOSE       | 2009                         |          |
| (ZOSTAVAX)           |                                    |          |
+----------------------+------------------------------------+----------+
 
 
 
 Social History
 
 
+--------------+-------+-----------+--------+------+
| Tobacco Use  | Types | Packs/Day | Years  | Date |
|              |       |           | Used   |      |
+--------------+-------+-----------+--------+------+
| Never Smoker |       |           |        |      |
+--------------+-------+-----------+--------+------+
 
 
 
+---------------------+------+---+----------+
| Smokeless Tobacco:  | Chew |   | Quit:    |
| Former User         |      |   | 19 |
|                     |      |   | 85       |
+---------------------+------+---+----------+
 
 
 
+-------------+-------------+---------+----------+
| Alcohol Use | Drinks/Week | oz/Week | Comments |
+-------------+-------------+---------+----------+
| Not Asked   |             |         |          |
+-------------+-------------+---------+----------+
 
 
 
+------------------+---------------+
| Sex Assigned at  | Date Recorded |
| Birth            |               |
+------------------+---------------+
| Not on file      |               |
+------------------+---------------+
 
 
 
 
+----------------+-------------+-------------+
| Job Start Date | Occupation  | Industry    |
+----------------+-------------+-------------+
| Not on file    | Not on file | Not on file |
+----------------+-------------+-------------+
 
 
 
+----------------+--------------+------------+
| Travel History | Travel Start | Travel End |
+----------------+--------------+------------+
 
 
 
+-------------------------------------+
| No recent travel history available. |
+-------------------------------------+
 
 
 
 Last Filed Vital Signs
 
 
+-------------------+----------------------+----------------------+----------+
| Vital Sign        | Reading              | Time Taken           | Comments |
+-------------------+----------------------+----------------------+----------+
| Blood Pressure    | 127/67               | 2020  7:36 AM  |          |
|                   |                      | PDT                  |          |
+-------------------+----------------------+----------------------+----------+
| Pulse             | 64                   | 2020  7:36 AM  |          |
|                   |                      | PDT                  |          |
+-------------------+----------------------+----------------------+----------+
| Temperature       | 36.2   C (97.2   F)  | 2020  7:36 AM  |          |
|                   |                      | PDT                  |          |
+-------------------+----------------------+----------------------+----------+
| Respiratory Rate  | 20                   | 2020  7:36 AM  |          |
|                   |                      | PDT                  |          |
+-------------------+----------------------+----------------------+----------+
| Oxygen Saturation | 96%                  | 2020  7:36 AM  |          |
|                   |                      | PDT                  |          |
+-------------------+----------------------+----------------------+----------+
| Inhaled Oxygen    | -                    | -                    |          |
| Concentration     |                      |                      |          |
+-------------------+----------------------+----------------------+----------+
| Weight            | 91.9 kg (202 lb 9.6  | 2020  1:05 PM  |          |
|                   | oz)                  | PST                  |          |
+-------------------+----------------------+----------------------+----------+
| Height            | 170.2 cm (5' 7")     | 2020  1:05 PM  |          |
|                   |                      | PST                  |          |
+-------------------+----------------------+----------------------+----------+
| Body Mass Index   | 31.73                | 2020  1:05 PM  |          |
|                   |                      | PST                  |          |
+-------------------+----------------------+----------------------+----------+
 
 
 
 Plan of Treatment
 
 
 
+----------------------+-----------+---------------------------------+----------+
| Health Maintenance   | Due Date  | Last Done                       | Comments |
+----------------------+-----------+---------------------------------+----------+
| Diabetic Eye Exam    |  |                                 |          |
|                      | 8         |                                 |          |
+----------------------+-----------+---------------------------------+----------+
| Diabetic Foot Exam   |  |                                 |          |
|                      | 8         |                                 |          |
+----------------------+-----------+---------------------------------+----------+
| Hemoglobin A1c       |  |                                 |          |
| Screening            | 8         |                                 |          |
+----------------------+-----------+---------------------------------+----------+
| Vaccine: Zoster (2   |  | 2009                      |          |
| of 3)                | 0         |                                 |          |
+----------------------+-----------+---------------------------------+----------+
| Adult Annual         |  |                                 |          |
| Wellness Visit       | 0         |                                 |          |
+----------------------+-----------+---------------------------------+----------+
| Microalbumin         |  |                                 |          |
| Screening            | 0         |                                 |          |
+----------------------+-----------+---------------------------------+----------+
| Vaccine:             |  | 2019                      |          |
| Pneumococcal 65+ (2  | 0         |                                 |          |
| of 2 - PPSV23)       |           |                                 |          |
+----------------------+-----------+---------------------------------+----------+
| Vaccine:             |  | 2012                      |          |
| Dtap/Tdap/Td (2 -    | 2         |                                 |          |
| Td)                  |           |                                 |          |
+----------------------+-----------+---------------------------------+----------+
| Vaccine: Influenza   | Completed | 2020, 2017,         |          |
|                      |           | 2011, Additional history  |          |
|                      |           | exists                          |          |
+----------------------+-----------+---------------------------------+----------+
 
 
 
 Procedures
 
 
+------------------+--------+-------------+----------------------+----------------------+
| Procedure Name   | Priori | Date/Time   | Associated Diagnosis | Comments             |
|                  | ty     |             |                      |                      |
+------------------+--------+-------------+----------------------+----------------------+
| BASIC METABOLIC  | Routin | 2020  |   Acute on chronic   |   Results for this   |
| PANEL            | e      |  1:30 PM    | diastolic            | procedure are in the |
|                  |        | PDT         | (congestive) heart   |  results section.    |
|                  |        |             | failure (HCC)        |                      |
|                  |        |             | Metabolic            |                      |
|                  |        |             | encephalopathy       |                      |
|                  |        |             | Essential (primary)  |                      |
|                  |        |             | hypertension         |                      |
+------------------+--------+-------------+----------------------+----------------------+
| POC PT/INR       | Routin | 2020  |   Atrial             |   Results for this   |
| FINGERSTICK      | e      |  6:50 AM    | fibrillation,        | procedure are in the |
|                  |        | PDT         | unspecified type     |  results section.    |
|                  |        |             | (HCC)  Monitoring    |                      |
|                  |        |             | for long-term        |                      |
|                  |        |             | anticoagulant use    |                      |
+------------------+--------+-------------+----------------------+----------------------+
 
 from Last 3 Months
 
 Results
 Basic Metabolic Panel (2020  1:30 PM PDT)
 
+-------------+--------------------------+-----------------+-------------+--------------+
| Component   | Value                    | Ref Range       | Performed   | Pathologist  |
|             |                          |                 | At          | Signature    |
+-------------+--------------------------+-----------------+-------------+--------------+
| Na          | 137                      | 136 - 145       | [facility]  |              |
|             |                          | mmol/L          | [facility]    |              |
|             |                          |                 | [facility]     |              |
|             |                          |                 | [facility]    |              |
|             |                          |                 | LABORATORY  |              |
+-------------+--------------------------+-----------------+-------------+--------------+
| K           | 3.1 (L)                  | 3.4 - 5.1       | [facility]  |              |
|             |                          | mmol/L          | [facility]    |              |
|             |                          |                 | [facility]     |              |
|             |                          |                 | [facility] -    |              |
|             |                          |                 | LABORATORY  |              |
+-------------+--------------------------+-----------------+-------------+--------------+
| Cl          | 94 (L)                   | 98 - 107 mmol/L | [facility]  |              |
|             |                          |                 | [facility]    |              |
|             |                          |                 | [facility]     |              |
|             |                          |                 | [facility] -    |              |
|             |                          |                 | LABORATORY  |              |
+-------------+--------------------------+-----------------+-------------+--------------+
| CO2         | 34 (H)                   | 20 - 31 mmol/L  | [facility]  |              |
|             |                          |                 | [facility]    |              |
|             |                          |                 | [facility]     |              |
|             |                          |                 | [facility] -    |              |
|             |                          |                 | LABORATORY  |              |
+-------------+--------------------------+-----------------+-------------+--------------+
| Anion Gap   | 9                        | 3 - 16 mmol/L   | [facility]  |              |
|             |                          |                 | [facility]    |              |
|             |                          |                 | [facility]     |              |
|             |                          |                 | [facility]    |              |
|             |                          |                 | LABORATORY  |              |
+-------------+--------------------------+-----------------+-------------+--------------+
| Glucose     | 159 (H)                  | 60 - 106 mg/dL  | [facility]  |              |
|             |                          |                 | [facility]    |              |
|             |                          |                 | [facility]     |              |
|             |                          |                 | [facility]    |              |
|             |                          |                 | LAB[facility]ORY  |              |
+-------------+--------------------------+-----------------+-------------+--------------+
| BUN         | 37 (H)                   | 9 - 23 mg/dL    | [facility]  |              |
|             |                          |                 | [facility]    |              |
|             |                          |                 | [facility]     |              |
|             |                          |                 | [facility]    |              |
|             |                          |                 | LABORATORY  |              |
+-------------+--------------------------+-----------------+-------------+--------------+
| Creatinine  | 1.30                     | 0.70 - 1.30     | [facility]  |              |
|             |                          | mg/dL           |  [facility]    |              |
|             |                          |                 | [facility]     |              |
|             |                          |                 | [facility]    |              |
|             |                          |                 | LAB[facility]ATORY  |              |
+-------------+--------------------------+-----------------+-------------+--------------+
| eGFR if not | 52 (L)Comment:           | >=60            | [facility]  |              |
|      | GLOMERULAR FILTRATION    | mL/min/1.73m2   | [facility]    |              |
| [employer]    | RATE,ESTIMATED           |                 | MEDICAL     |              |
 
|             |   mL/min/1.70z9Yjca than |                 | CENTER -    |              |
|             |  60    Chronic kidney    |                 | LABORATORY  |              |
|             | disease,if found over a  |                 |             |              |
|             | 3-month period.Less than |                 |             |              |
|             |  15    Kidney failureFor |                 |             |              |
|             |                   |                 |             |              |
|             | Americans,multiply the   |                 |             |              |
|             | calculated GFR by 1.21.  |                 |             |              |
|             |                          |                 |             |              |
+-------------+--------------------------+-----------------+-------------+--------------+
| Calcium     | 9.6                      | 8.7 - 10.4      | [facility]  |              |
|             |                          | mg/dL           |  [facility]    |              |
|             |                          |                 | [facility]     |              |
|             |                          |                 | [facility] -    |              |
|             |                          |                 | LABORATORY  |              |
+-------------+--------------------------+-----------------+-------------+--------------+
| BUN/Creatin | 28.5                     |                 | [facility]  |              |
| ine Ratio   |                          |                 | [facility]    |              |
|             |                          |                 | [facility]     |              |
|             |                          |                 | [facility] -    |              |
|             |                          |                 | LABORATORY  |              |
+-------------+--------------------------+-----------------+-------------+--------------+
 
 
 
+----------+
| Specimen |
+----------+
| Blood    |
+----------+
 
 
 
+----------------------+--------------------+--------------------+----------------+
| Performing           | Address            | City/State/Zipcode | Phone Number   |
| Organization         |                    |                    |                |
+----------------------+--------------------+--------------------+----------------+
|   [facility]     |   [address] |   [place]  |   [phone] |
| [facility]  |                    | [address]              |                |
| - LABORATORY         |                    |                    |                |
+----------------------+--------------------+--------------------+----------------+
 POCT PT/INR fingerstick (2020  6:50 AM PDT)
 
+-----------+---------+-----------+------------+--------------+
| Component | Value   | Ref Range | Performed  | Pathologist  |
|           |         |           | At         | Signature    |
+-----------+---------+-----------+------------+--------------+
| INR, POC  | 3.2 (A) | 0.9 - 1.2 |            |              |
+-----------+---------+-----------+------------+--------------+
 
 
 
+----------+
| Specimen |
+----------+
| Blood    |
+----------+
 from Last 3 Months
 
 Insurance
 
 
 
+----------+--------+-------------+--------+-------------+------------+--------+
| Payer    | Benefi | Subscriber  | Effect | Phone       | Address    | Type   |
|          | t Plan | ID          | meena    |             |            |        |
|          |  /     |             | Dates  |             |            |        |
|          | Group  |             |        |             |            |        |
+----------+--------+-------------+--------+-------------+------------+--------+
| MEDICARE | MEDICA | [id number] | 20 | [phone] |            | Medica |
|          | RE     |             | 07-Pre | 5           |            | re     |
|          | PART A |             | sent   |             |            |        |
|          |  AND B |             |        |             |            |        |
+----------+--------+-------------+--------+-------------+------------+--------+
| MODA     | MODA   | [id number]   | 3/1/20 | [phone] |   [address]   | Indemn |
|          | HEALTH |             | 08-Pre | 9           | [address]      | ity    |
|          |  MDCR  |             | sent   |             | [place],  |        |
|          | [employer]  |             |        |             | [place] [address]   |        |
+----------+--------+-------------+--------+-------------+------------+--------+
 
 
 
+----------------+--------+-------------+--------+-------------+---------------------+
| Guarantor Name | Accoun | Relation to | Date   | Phone       | Billing Address     |
|                | t Type |  Patient    | of     |             |                     |
|                |        |             | Birth  |             |                     |
+----------------+--------+-------------+--------+-------------+---------------------+
| ClarenceIsak rey  | Person | Self        | / |             |   [address]      |
|                | al/Fam |             | 1930   | [phone] | [place] [address] |
|                | jacek    |             |        | 6 (Home)    |                     |
+----------------+--------+-------------+--------+-------------+---------------------+
 
 
 
 Advance Directives
 
 
+-----------+---------------+----------------+-------------+
| Type      | Date Recorded | Patient        | Explanation |
|           |               | Representative |             |
+-----------+---------------+----------------+-------------+
| Power of  |               |                |             |
|   |               |                |             |
+-----------+---------------+----------------+-------------+
| Advance   |               |                |             |
| Directive |               |                |             |
+-----------+---------------+----------------+-------------+

## 2020-06-03 NOTE — XMS
Encounter Summary
  Created on: 2020
 
 Isak Hilario
 External Reference #: [id number]
 : 30
 Sex: Male
 
 Demographics
 
 
+-----------------------+----------------------+
| Address               | [address]         |
|                       | [place]  [address] |
+-----------------------+----------------------+
| Home Phone            | [phone]      |
+-----------------------+----------------------+
| Preferred Language    | Unknown              |
+-----------------------+----------------------+
| Marital Status        |               |
+-----------------------+----------------------+
| Anabaptism Affiliation | Unknown              |
+-----------------------+----------------------+
| Race                  | Unknown              |
+-----------------------+----------------------+
| Ethnic Group          | Unknown              |
+-----------------------+----------------------+
 
 
 Author
 
 
+--------------+--------------------------------------------+
| Author       | [facility]  |
|              | [facility] [place]                                |
+--------------+--------------------------------------------+
| Organization | [facility]  |
|              | [facility] [place]                                |
+--------------+--------------------------------------------+
| Address      | Unknown                                    |
+--------------+--------------------------------------------+
| Phone        | Unavailable                                |
+--------------+--------------------------------------------+
 
 
 
 Support
 
 
+-------------+--------------+---------+-----------------+
| Name        | Relationship | Address | Phone           |
+-------------+--------------+---------+-----------------+
| Alessandra Saxena | ECON         | Unknown | [phone] |
+-------------+--------------+---------+-----------------+
 
 
 
 Care Team Providers
 
 
 
+-----------------------+------+-----------------+
| Care Team Member Name | Role | Phone           |
+-----------------------+------+-----------------+
| Bari Ha MD | PCP  | [phone] |
+-----------------------+------+-----------------+
 
 
 
 Encounter Details
 
 
+--------+----------+----------------------+--------------------+-------------+
| Date   | Type     | Department           | Care Team          | Description |
+--------+----------+----------------------+--------------------+-------------+
| / | Abstract |   [facility]  |   Peter Hess  |             |
| 2020   |          | SURGERY  [address]   | [address]    |             |
|        |          | St  [place]  | [address]  [facility]    |             |
|        |          | [address]           | [place] [address]    |             |
|        |          | [phone]         | [phone]       |             |
|        |          |                      | [phone] (Fax) |             |
+--------+----------+----------------------+--------------------+-------------+
 
 
 
 Social History
 
 
+--------------+-------+-----------+--------+------+
| Tobacco Use  | Types | Packs/Day | Years  | Date |
|              |       |           | Used   |      |
+--------------+-------+-----------+--------+------+
| Never Smoker |       |           |        |      |
+--------------+-------+-----------+--------+------+
 
 
 
+-------------+-------------+---------+----------+
| Alcohol Use | Drinks/Week | oz/Week | Comments |
+-------------+-------------+---------+----------+
| Not Asked   |             |         |          |
+-------------+-------------+---------+----------+
 
 
 
+------------------+---------------+
| Sex Assigned at  | Date Recorded |
| Birth            |               |
+------------------+---------------+
| Not on file      |               |
+------------------+---------------+
 
 
 
+----------------+-------------+-------------+
| Job Start Date | Occupation  | Industry    |
+----------------+-------------+-------------+
| Not on file    | Not on file | Not on file |
+----------------+-------------+-------------+
 
 
 
 
+----------------+--------------+------------+
| Travel History | Travel Start | Travel End |
+----------------+--------------+------------+
 
 
 
+-------------------------------------+
| No recent travel history available. |
+-------------------------------------+
 documented as of this encounter
 
 Plan of Treatment
 Not on filedocumented as of this encounter
 
 Visit Diagnoses
 Not on filedocumented in this encounter

## 2020-06-03 NOTE — XMS
Encounter Summary
  Created on: 2020
 
 Isak Hilario
 External Reference #: [id number]
 : 30
 Sex: Male
 
 Demographics
 
 
+-----------------------+------------------------+
| Address               | [address]       |
|                       | [place]  [address]   |
+-----------------------+------------------------+
| Home Phone            | [phone]        |
+-----------------------+------------------------+
| Preferred Language    | Unknown                |
+-----------------------+------------------------+
| Marital Status        |                 |
+-----------------------+------------------------+
| Gnosticism Affiliation | NON                    |
+-----------------------+------------------------+
| Race                  | White                  |
+-----------------------+------------------------+
| Ethnic Group          | Not  or  |
+-----------------------+------------------------+
 
 
 Author
 
 
+--------------+------------------------------+
| Author       | [facility] |
+--------------+------------------------------+
| Organization | [facility] |
+--------------+------------------------------+
| Address      | Unknown                      |
+--------------+------------------------------+
| Phone        | Unavailable                  |
+--------------+------------------------------+
 
 
 
 Support
 
 
+-------------------+--------------+-------------------+-----------------+
| Name              | Relationship | Address           | Phone           |
+-------------------+--------------+-------------------+-----------------+
| Jackie Hilario | ECON         | [address]      | [phone] |
|                   |              | [place], [place]   |                 |
|                   |              | [address]             |                 |
+-------------------+--------------+-------------------+-----------------+
 
 
 
 Care Team Providers
 
 
 
+-----------------------+------+-------------+
| Care Team Member Name | Role | Phone       |
+-----------------------+------+-------------+
 PCP  | Unavailable |
+-----------------------+------+-------------+
 
 
 
 Encounter Details
 
 
+--------+----------+----------------------+--------------------+-------------+
| Date   | Type     | Department           | Care Team          | Description |
+--------+----------+----------------------+--------------------+-------------+
| 05/05/ | Results  |   LAB CORE  [address]  |   [facility]   |             |
|    | Only     | [address]  | [phone]       |             |
|        |          |  [place]        |                    |             |
|        |          | [address]           |                    |             |
|        |          | [phone]         |                    |             |
+--------+----------+----------------------+--------------------+-------------+
 
 
 
 Social History
 
 
+----------------+-------+-----------+--------+------+
| Tobacco Use    | Types | Packs/Day | Years  | Date |
|                |       |           | Used   |      |
+----------------+-------+-----------+--------+------+
| Never Assessed |       |           |        |      |
+----------------+-------+-----------+--------+------+
 
 
 
+------------------+---------------+
| Sex Assigned at  | Date Recorded |
| Birth            |               |
+------------------+---------------+
| Not on file      |               |
+------------------+---------------+
 
 
 
+----------------+-------------+-------------+
| Job Start Date | Occupation  | Industry    |
+----------------+-------------+-------------+
| Not on file    | Not on file | Not on file |
+----------------+-------------+-------------+
 
 
 
+----------------+--------------+------------+
| Travel History | Travel Start | Travel End |
+----------------+--------------+------------+
 
 
 
 
+-------------------------------------+
| No recent travel history available. |
+-------------------------------------+
 documented as of this encounter
 
 Plan of Treatment
 Not on filedocumented as of this encounter
 
 Procedures
 
 
+--------------------+--------+------------+----------------------+----------------------+
| Procedure Name     | Priori | Date/Time  | Associated Diagnosis | Comments             |
|                    | ty     |            |                      |                      |
+--------------------+--------+------------+----------------------+----------------------+
| SURGICAL PATHOLOGY | Routin | 1998 |                      |   Results for this   |
|                    | e      |            |                      | procedure are in the |
|                    |        |            |                      |  results section.    |
+--------------------+--------+------------+----------------------+----------------------+
 documented in this encounter
 
 Results
 SURGICAL PATHOLOGY (1998)
 
+-----------+--------------------------+-----------+-------------+--------------+
| Component | Value                    | Ref Range | Performed   | Pathologist  |
|           |                          |           | At          | Signature    |
+-----------+--------------------------+-----------+-------------+--------------+
| SURGICAL  | SOURCE OF SPECIMEN: SEE  |           | [facility]        |              |
| PATHOLOGY | RESULTS                  |           | DEPARTMENT  |              |
|           | Preliminary              |           | OF          |              |
|           | History:GROSS            |           | PATHOLOGY   |              |
|           | DESCRIPTIONReceived from |           |             |              |
|           |  [place] [place]  |           |             |              |
|           | [place], [place], are six  |           |             |              |
|           | H&E-stainedslides        |           |             |              |
|           | labeled with the         |           |             |              |
|           | patient's name           |           |             |              |
|           | "Sulaiman". The           |           |             |              |
|           | correspondingPathology   |           |             |              |
|           | report is labeled with   |           |             |              |
|           | the patient's name and   |           |             |              |
|           | dated 98.           |           |             |              |
|           | MICROSCOPIC              |           |             |              |
|           | DESCRIPTIONProstate,     |           |             |              |
|           | Needle Core Biopsies,    |           |             |              |
|           | A-F: Low power           |           |             |              |
|           | demonstrates foci        |           |             |              |
|           | ofsmall, single glands   |           |             |              |
|           | compressed together with |           |             |              |
|           |  areas of cribriforming  |           |             |              |
|           | andareas of infiltrating |           |             |              |
|           |  fused glands. On high   |           |             |              |
|           | power, the malignant     |           |             |              |
|           | cellshave an increased   |           |             |              |
|           | nuclear to cytoplasmic   |           |             |              |
|           | ratio, prominent         |           |             |              |
|           | nucleoli,mildly          |           |             |              |
|           | irregular nuclear        |           |             |              |
|           | membranes, and           |           |             |              |
 
|           | pleomorphism. There      |           |             |              |
|           | areeosinophilic          |           |             |              |
|           | crystalloids in some of  |           |             |              |
|           | the malignant glands. In |           |             |              |
|           |  slide E,there is        |           |             |              |
|           | infiltration of a nerve  |           |             |              |
|           | by malignant glands. The |           |             |              |
|           |  remainder ofthe tissue  |           |             |              |
|           | demonstrates mild gland  |           |             |              |
|           | atrophy.       FINAL     |           |             |              |
|           | DIAGNOSISPROSTATE, LEFT  |           |             |              |
|           | BASE, NEEDLE CORE BIOPSY |           |             |              |
|           |  (A):   PROSTATE         |           |             |              |
|           | ADENOCARCINOMA,          |           |             |              |
|           | ALINE'S GRADE 3+4/5+5, |           |             |              |
|           |  COMPRISING 30% OF       |           |             |              |
|           |   BIOPSYPROSTATE, LEFT   |           |             |              |
|           | MID, NEEDLE CORE BIOPSY  |           |             |              |
|           | (B):   PROSTATE          |           |             |              |
|           | ADENOCARCINOMA,          |           |             |              |
|           | ALINE'S GRADE 3+4/5+5, |           |             |              |
|           |  COMPRISING 60% OF       |           |             |              |
|           |   BIOPSYPROSTATE, LEFT   |           |             |              |
|           | APEX, NEEDLE CORE BIOPSY |           |             |              |
|           |  (C):   BENIGN PROSTATE  |           |             |              |
|           | TISSUEPROSTATE, RIGHT    |           |             |              |
|           | BASE, NEEDLE CORE BIOPSY |           |             |              |
|           |  (D):   PROSTATE         |           |             |              |
|           | ADENOCARCINOMA,          |           |             |              |
|           | ALINE'S GRADE 3+4/5+5, |           |             |              |
|           |  COMPRISING 50% OF       |           |             |              |
|           |   BIOPSYPROSTATE, RIGHT  |           |             |              |
|           | MID, NEEDLE CORE BIOPSY  |           |             |              |
|           | (E):   PROSTATE          |           |             |              |
|           | ADENOCARCINOMA,          |           |             |              |
|           | ALINE'S GRADE 3+4/5+5, |           |             |              |
|           |  COMPRISING 10% OF       |           |             |              |
|           |   BIOPSY, INFILTRATING   |           |             |              |
|           | NERVEPROSTATE, RIGHT     |           |             |              |
|           | APEX, NEEDLE CORE BIOPSY |           |             |              |
|           |  (F):   BENIGN PROSTATE  |           |             |              |
|           | TISSUE(outside slides)   |           |             |              |
|           |       Case reviewed by:  |           |             |              |
|           |   Bhavana Leigh M.D.Case   |           |             |              |
|           | staffed by:   Harley      |           |             |              |
|           | Dex                 |           |             |              |
|           | M.D.T:98/fSix slides |           |             |              |
|           |  are returned with the   |           |             |              |
|           | report.       My         |           |             |              |
|           | electronic signature     |           |             |              |
|           | indicates that I have    |           |             |              |
|           | personally reviewed      |           |             |              |
|           | alldiagnostic slides,    |           |             |              |
|           | the gross and/or         |           |             |              |
|           | microscopic portion of   |           |             |              |
|           | thisreport and           |           |             |              |
|           | formulated the final     |           |             |              |
|           | diagnosis.               |           |             |              |
+-----------+--------------------------+-----------+-------------+--------------+
 
 
 
 
+----------+
| Specimen |
+----------+
| Other    |
+----------+
 
 
 
+----------------------+------------------------+----------------------+--------------+
| Performing           | Address                | City/State/Zipcode   | Phone Number |
| Organization         |                        |                      |              |
+----------------------+------------------------+----------------------+--------------+
|   [facility] |   [address]  |   [place] [address] |              |
|  PATHOLOGY           | [place]                |                      |              |
+----------------------+------------------------+----------------------+--------------+
 documented in this encounter
 
 Visit Diagnoses
 Not on filedocumented in this encounter

## 2020-06-03 NOTE — XMS
Encounter Summary
  Created on: 2020
 
 Iask Hilario
 External Reference #: [id number]
 : 30
 Sex: Male
 
 Demographics
 
 
+-----------------------+------------------------+
| Address               | [address]       |
|                       | [place]  [address]   |
+-----------------------+------------------------+
| Home Phone            | [phone]        |
+-----------------------+------------------------+
| Preferred Language    | Unknown                |
+-----------------------+------------------------+
| Marital Status        |                 |
+-----------------------+------------------------+
| Congregation Affiliation | NON                    |
+-----------------------+------------------------+
| Race                  | White                  |
+-----------------------+------------------------+
| Ethnic Group          | Not  or  |
+-----------------------+------------------------+
 
 
 Author
 
 
+--------------+------------------------------+
| Author       | [facility] |
+--------------+------------------------------+
| Organization | [facility] |
+--------------+------------------------------+
| Address      | Unknown                      |
+--------------+------------------------------+
| Phone        | Unavailable                  |
+--------------+------------------------------+
 
 
 
 Support
 
 
+-------------------+--------------+-------------------+-----------------+
| Name              | Relationship | Address           | Phone           |
+-------------------+--------------+-------------------+-----------------+
| Jackie Hilario | ECON         | [address]      | [phone] |
|                   |              | [place], [place]   |                 |
|                   |              | [address]             |                 |
+-------------------+--------------+-------------------+-----------------+
 
 
 
 Care Team Providers
 
 
 
+-----------------------+------+-------------+
| Care Team Member Name | Role | Phone       |
+-----------------------+------+-------------+
 PCP  | Unavailable |
+-----------------------+------+-------------+
 
 
 
 Encounter Details
 
 
+--------+-------------+----------------------+----------------------+------------------+
| Date   | Type        | Department           | Care Team            | Description      |
+--------+-------------+----------------------+----------------------+------------------+
| 05/15/ | Discharge   |   [facility] at  |   Summary, Discharge | D/C Summary ODDS |
|    | Summary-Tra | [facility]  [address]         |                      |                  |
|        | [address]    | [address]   |                      |                  |
|        |             | [address]         |                      |                  |
|        |             | [facility], 7th floor  |                      |                  |
|        |             |  [place], [place]        |                      |                  |
|        |             | [address]           |                      |                  |
|        |             | [phone]         |                      |                  |
+--------+-------------+----------------------+----------------------+------------------+
 
 
 
 Social History
 
 
+----------------+-------+-----------+--------+------+
| Tobacco Use    | Types | Packs/Day | Years  | Date |
|                |       |           | Used   |      |
+----------------+-------+-----------+--------+------+
| Never Assessed |       |           |        |      |
+----------------+-------+-----------+--------+------+
 
 
 
+------------------+---------------+
| Sex Assigned at  | Date Recorded |
| Birth            |               |
+------------------+---------------+
| Not on file      |               |
+------------------+---------------+
 
 
 
+----------------+-------------+-------------+
| Job Start Date | Occupation  | Industry    |
+----------------+-------------+-------------+
| Not on file    | Not on file | Not on file |
+----------------+-------------+-------------+
 
 
 
+----------------+--------------+------------+
| Travel History | Travel Start | Travel End |
+----------------+--------------+------------+
 
 
 
 
+-------------------------------------+
| No recent travel history available. |
+-------------------------------------+
 documented as of this encounter
 
 Discharge Summaries
 Interface, Transcription In - 2007  3:05 AM PST  [address] [address] [phone]
   [facility]
  
  MEDICAL SUMMARY OF HOSPITALIZATION
  
  Med Rec No.: [id number] Admission Date: 98
  
  Name: Isak Hilario Discharge Date: 05/15/98
  
  
  STAFF PHYSICIAN: [facility]
   Chief, Urologic Oncology
   , Surgery
  
  PRINCIPAL FINAL DIAGNOSIS: Prostate carcinoma.
  
  PRINCIPAL PROCEDURE: Radical retropubic prostatectomy and
   bilateral pelvic lymph node dissection.
  
  REASON FOR ADMISSION: The patient is a 67-year-old male with
   elevated prostatic-specific antigen and
   abnormal rectal examination in
  addition to prostate biopsies significant for adenocarcinoma in both lobes
  of the prostate, with a high Vaishali grade of 3+3/10. He had previously
  undergone follow up seminal vesicle biopsies which were negative. He
  preferred to undergo a radical retropubic prostatectomy. Intraoperative
  frozen sections from a pelvic lymph node dissection were negative.
  
  HOSPITAL COURSE: The patient was admitted postoperatively
   from the above procedure which he tolerated
   very well, with the frozen
  sections of the lymph nodes obviously negative, given that he also underwent
  the radical retropubic prostatectomy. He was start don sips on
  postoperative day one and advanced to clears and a regular diet by
  postoperative day two. He remained afebrile with stable vital signs and
  minimal output from his Kevin-Mendieta drain which was also discontinued on
  the morning of postoperative day two.
  
  The patient is being discharged to home on postoperative day two. He was
  tolerating a regular diet, passing gas and having undergone leg bag and
  night bag instructions for Soto catheter management.
  
  CONDITION ON DISCHARGE: Stable.
  
  DISCHARGE MEDICATION(S): Tylox 1-2 tablets q. 6 h. p.r.n.
   Colace 100 mg p.o. b.i.d.
   Bactrim DS 1 tablet p.o. b.i.d., to be taken
   after the catheter is removed.
  
 
  DISCHARGE INSTRUCTION(S): Discharge Activity: His activity is
   restricted to no lifting of greater than 10
   pounds.
  
   Discharge Diet: Resume a normal
   preoperative diet.
  
   Follow-up Recommendations:
   1. Follow up with Doctor Kelvin Hernandez in
   Urology in 8 weeks.
   2. He will follow up with his referring
   physician in two weeks for catheter
   removal.
  
  
  
  
  
  Win Sidhu M.D.
  Resident, Urology
  [facility]
  Chief, Urologic Oncology
  , SurgeryAMINAHK/rb
  D: 05/15/98
  T: 98 12:56 P
  
  cc:
  
  
  
  
  JUAN CARLOS OLIVEIRA MD
  [address]
  [place] [address]
   Electronically signed by Interface, Transcription In at 2007  3:05 AM PSTdocumented
 in this encounter
 
 Plan of Treatment
 Not on filedocumented as of this encounter
 
 Visit Diagnoses
 Not on filedocumented in this encounter

## 2020-06-03 NOTE — XMS
Encounter Summary
  Created on: 2020
 
 Isak Hilario
 External Reference #: [id number]
 : 30
 Sex: Male
 
 Demographics
 
 
+-----------------------+----------------------+
| Address               | [address]         |
|                       | [place]  [address] |
+-----------------------+----------------------+
| Home Phone            | [phone]      |
+-----------------------+----------------------+
| Preferred Language    | Unknown              |
+-----------------------+----------------------+
| Marital Status        |               |
+-----------------------+----------------------+
| Lutheran Affiliation | Unknown              |
+-----------------------+----------------------+
| Race                  | Unknown              |
+-----------------------+----------------------+
| Ethnic Group          | Unknown              |
+-----------------------+----------------------+
 
 
 Author
 
 
+--------------+--------------------------------------------+
| Author       | [facility]  |
|              | and [place]                                |
+--------------+--------------------------------------------+
| Organization | [facility]  |
|              | and [place]                                |
+--------------+--------------------------------------------+
| Address      | Unknown                                    |
+--------------+--------------------------------------------+
| Phone        | Unavailable                                |
+--------------+--------------------------------------------+
 
 
 
 Support
 
 
+-------------+--------------+---------+-----------------+
| Name        | Relationship | Address | Phone           |
+-------------+--------------+---------+-----------------+
| Alessandra Saxena | ECON         | Unknown | [phone] |
+-------------+--------------+---------+-----------------+
 
 
 
 Care Team Providers
 
 
 
+-----------------------+------+-----------------+
| Care Team Member Name | Role | Phone           |
+-----------------------+------+-----------------+
| Bari Ha MD | PCP  | [phone] |
+-----------------------+------+-----------------+
 
 
 
 Reason for Visit
 
 
+---------------+------------------+
| Reason        | Comments         |
+---------------+------------------+
| Carpal Tunnel | wants injections |
+---------------+------------------+
 
 
 
 Encounter Details
 
 
+--------+---------+---------------------+----------------------+--------------------+
| Date   | Type    | Department          | Care Team            | Description        |
+--------+---------+---------------------+----------------------+--------------------+
| / | Office  |   [facility]         |   Ed Patel   | Carpal tunnel      |
|    | Visit   | PHYSIATRY  [address]    | T, MD  [address]  | syndrome (Primary  |
|        |         | [address]   | [place]  | Dx)                |
|        |         | [place], [place]     | [address]  [phone]  |                    |
|        |         | [address]          |  [phone] (Fax)  |                    |
|        |         | [phone]        |                      |                    |
+--------+---------+---------------------+----------------------+--------------------+
 
 
 
 Social History
 
 
+--------------+-------+-----------+--------+------+
| Tobacco Use  | Types | Packs/Day | Years  | Date |
|              |       |           | Used   |      |
+--------------+-------+-----------+--------+------+
| Never Smoker |       |           |        |      |
+--------------+-------+-----------+--------+------+
 
 
 
+-------------+-------------+---------+----------+
| Alcohol Use | Drinks/Week | oz/Week | Comments |
+-------------+-------------+---------+----------+
| Not Asked   |             |         |          |
+-------------+-------------+---------+----------+
 
 
 
+------------------+---------------+
| Sex Assigned at  | Date Recorded |
| Birth            |               |
 
+------------------+---------------+
| Not on file      |               |
+------------------+---------------+
 
 
 
+----------------+-------------+-------------+
| Job Start Date | Occupation  | Industry    |
+----------------+-------------+-------------+
| Not on file    | Not on file | Not on file |
+----------------+-------------+-------------+
 
 
 
+----------------+--------------+------------+
| Travel History | Travel Start | Travel End |
+----------------+--------------+------------+
 
 
 
+-------------------------------------+
| No recent travel history available. |
+-------------------------------------+
 documented as of this encounter
 
 Last Filed Vital Signs
 
 
+-------------------+------------------+----------------------+----------+
| Vital Sign        | Reading          | Time Taken           | Comments |
+-------------------+------------------+----------------------+----------+
| Blood Pressure    | 147/74           | 2013 11:18 AM  |          |
|                   |                  | PST                  |          |
+-------------------+------------------+----------------------+----------+
| Pulse             | 85               | 2013 11:18 AM  |          |
|                   |                  | PST                  |          |
+-------------------+------------------+----------------------+----------+
| Temperature       | -                | -                    |          |
+-------------------+------------------+----------------------+----------+
| Respiratory Rate  | 14               | 2013 11:18 AM  |          |
|                   |                  | PST                  |          |
+-------------------+------------------+----------------------+----------+
| Oxygen Saturation | -                | -                    |          |
+-------------------+------------------+----------------------+----------+
| Inhaled Oxygen    | -                | -                    |          |
| Concentration     |                  |                      |          |
+-------------------+------------------+----------------------+----------+
| Weight            | 93 kg (205 lb)   | 2013 11:18 AM  |          |
|                   |                  | PST                  |          |
+-------------------+------------------+----------------------+----------+
| Height            | 170.2 cm (5' 7") | 2013 11:18 AM  |          |
|                   |                  | PST                  |          |
+-------------------+------------------+----------------------+----------+
| Body Mass Index   | 32.11            | 2013 11:18 AM  |          |
|                   |                  | PST                  |          |
+-------------------+------------------+----------------------+----------+
 documented in this encounter
 
 Progress Notes
 Ed Patel MD - 2013  1:41 PM PSTFormatting of this note might be different 
 
from the original.
 Subjective: 
  
 Patient ID: Isak Hilario is a 82 y.o. male.
 Chief Complaint 
 Patient presents with 
   Carpal Tunnel 
   wants injections 
 
 HPI The patient is being seen today in follow-up for complaints of bilateral wrist pain as 
well as numbness in the first three digits on both hands. The patient had prior carpal tunne
l releases in .  He reports that for several years after he had improvement of his sympt
oms.  The symptoms then started to reoccur approximately 3-4 years ago.  He was having signi
ficant neck pain at that time as well and x-rays and an MRI were ordered.  He was found to h
ave significant DDD and some cervical stenosis.  He then had EMG and nerve conduction studie
s by Dr. Smith Acuña which showed severe residual or recurrent carpal tunnel syndrome. He did
 not mention any evidence of cervical radiculopathy.  He did have some evidence of a periphe
ral neuropathy. After the EMG he was then referred to me for potential carpal tunnel injecti
ons.  I felt it would be in his best interest to try carpal tunnel splints first.  He did tr
y them and unfortunately they did not help.  He did receive carpal tunnel injections and his
 symptoms improved for several months.  Overall he was happy with the results and wishes to 
repeat the injections again today.
 He describes his symptoms as burning, uncomfortable numbness.  The discomfort is fairly con
stant and not necessarily activity related.  He does also have significant stiffness in a lo
t of his joints including his wrists. 
 
 Imaging of the cervical spine, including an MRI were reviewed today.  I did also personally
 review the EMG report from Dr. Acuña.
 
 IMPRESSION:
 
 1. Carpal tunnel syndrome  
 2. DDD cervical spine 
 3. Diabetes mellitus  
 4. Diabetic peripheral neuropathy  
 
 PLAN: A review of the EMG report by Dr. Nils Acuña and his response to the prior inject
ions would suggest to me that the recurrent or residual carpal tunnel syndrome seems to be t
he greatest source of the patient's current symptoms or at least a significant contributing 
source. The patient has benefited from carpal tunnel injections previously and the patient d
oes wish to pursue this option and consented to the procedure on both wrists today.  Potenti
al risks and alternative treatments were discussed in detail.
 
 Description of procedure: Bilateral carpal tunnel injections were performed using ultrasoun
d guidance.  
 
 After the patient gave consent for the procedure the area for the right wrist injection was
 located by direct ultrasound visualization.  Then the area was sterilized with chlorhexidin
e scrub and a sterile drape was applied. A sterile probe cover and sterile ultrasound gel wa
s applied to the probe.  Then under direct ultrasound guidance a 27-gauge 1-1/2 inch needle 
was inserted down into the carpal tunnel just deep to the median nerve.  A total volume of 2
 mL including 1 mL of triamcinolone ( 40 milligrams per mL) and 1 mL of 1% lidocaine was the
n infused. Pictures were taken to document needle placement.  The needle was then removed an
d the area was cleaned and bandaged. The procedure was then repeated in the same manner on t
he left.  The patient tolerated the procedure well. There were no complications.  The patien
t was instructed to ice the area for 15-20 minutes several times over the next few days and 
to watch for any signs of infection.
 
 The patient may be a candidate at some point for carpal tunnel release and I would be happy
 to provide a referral if he wishes to pursue that option.
 
 
 Electronically signed by Ed Patel MD at 2013  8:25 AM PSTdocumented in this
 encounter
 
 Plan of Treatment
 Not on filedocumented as of this encounter
 
 Visit Diagnoses
 
 
+------------------------------------+
| Diagnosis                          |
+------------------------------------+
|   Carpal tunnel syndrome - Primary |
+------------------------------------+
 documented in this encounter

## 2020-06-03 NOTE — NUR
PT ADMITTED TO ROOM 120 FROM ED.  REQUIRED MAX ASSIST OF STAFF TO PULL HIM
ACROSS FROM STRETCHER TO BED.  RA, SATS IN THE 90'S.  TAPED ATTENDS IN PLACE,
WAS INCONT IN ED PRIOR TO ADMISSION.  RIGHT ANKLE, LE WITH 2+ PITTING EDEMA,
NOTABLE SCAR ALONG RIGHT KNEE.  LEFT LLE WITH TRACE.  PERIAREA EXCORIATION
NOTED, ED PLACED BARRIER CREAM PRIOR TO ADMISSION.  PT ABLE TO ANSWER
QUESTION, HOWEVER, UNABLE TO PROVIDE HISTORY, DOESN'T KNOW WHEN HIS LAST BM
WAS, OR IF HE USES THE WALKER OR W/C.  HR IN THE 30'S.  WILL PLACE BED ALARM
UNTIL SAFETY OF PT IS KNOWN.

## 2020-06-03 NOTE — XMS
Encounter Summary
  Created on: 2020
 
 Isak Hilario
 External Reference #: [id number]
 : 30
 Sex: Male
 
 Demographics
 
 
+-----------------------+----------------------+
| Address               | [address]         |
|                       | [place]  [address] |
+-----------------------+----------------------+
| Home Phone            | [phone]      |
+-----------------------+----------------------+
| Preferred Language    | Unknown              |
+-----------------------+----------------------+
| Marital Status        |               |
+-----------------------+----------------------+
| [employer]ology Affiliation | Unknown              |
+-----------------------+----------------------+
| Race                  | Unknown              |
+-----------------------+----------------------+
| Ethnic Group          | Unknown              |
+-----------------------+----------------------+
 
 
 Author
 
 
+--------------+--------------------------------------------+
| Author       | [facility]  |
|              | and [place]                                |
+--------------+--------------------------------------------+
| Organization | [facility]  |
|              | and [place]                                |
+--------------+--------------------------------------------+
| Address      | Unknown                                    |
+--------------+--------------------------------------------+
| Phone        | Unavailable                                |
+--------------+--------------------------------------------+
 
 
 
 Support
 
 
+-------------+--------------+---------+-----------------+
| Name        | Relationship | Address | Phone           |
+-------------+--------------+---------+-----------------+
| Alessandra Saxena | ECON         | Unknown | [phone] |
+-------------+--------------+---------+-----------------+
 
 
 
 Care Team Providers
 
 
 
+-----------------------+------+-----------------+
| Care Team Member Name | Role | Phone           |
+-----------------------+------+-----------------+
| Bari Ha MD | PCP  | [phone] |
+-----------------------+------+-----------------+
 
 
 
 Reason for Visit
 
 
+----------------------+----------+
| Reason               | Comments |
+----------------------+----------+
| Coordination Of Care |          |
+----------------------+----------+
 
 
 
 Encounter Details
 
 
+--------+-----------+----------------------+--------------------+----------------------+
| Date   | Type      | Department           | Care Team          | Description          |
+--------+-----------+----------------------+--------------------+----------------------+
| / | Telephone |   [facility]  |   Peter Hess  | Coordination Of Care |
| 2020   |           | SURGERY  [address]   | [address]    |                      |
|        |           | [place]  [place]  | [address]  [facility]    |                      |
|        |           | [address]           | [place] [address]    |                      |
|        |           | [phone]         | [phone]       |                      |
|        |           |                      | [phone] (Fax) |                      |
+--------+-----------+----------------------+--------------------+----------------------+
 
 
 
 Social History
 
 
+--------------+-------+-----------+--------+------+
| Tobacco Use  | Types | Packs/Day | Years  | Date |
|              |       |           | Used   |      |
+--------------+-------+-----------+--------+------+
| Never Smoker |       |           |        |      |
+--------------+-------+-----------+--------+------+
 
 
 
+---------------------+------+---+----------+
| Smokeless Tobacco:  | Chew |   | Quit:    |
| Former User         |      |   | 19 |
|                     |      |   | 85       |
+---------------------+------+---+----------+
 
 
 
+-------------+-------------+---------+----------+
| Alcohol Use | Drinks/Week | oz/Week | Comments |
+-------------+-------------+---------+----------+
 
| Not Asked   |             |         |          |
+-------------+-------------+---------+----------+
 
 
 
+------------------+---------------+
| Sex Assigned at  | Date Recorded |
| Birth            |               |
+------------------+---------------+
| Not on file      |               |
+------------------+---------------+
 
 
 
+----------------+-------------+-------------+
| Job Start Date | Occupation  | Industry    |
+----------------+-------------+-------------+
| Not on file    | Not on file | Not on file |
+----------------+-------------+-------------+
 
 
 
+----------------+--------------+------------+
| Travel History | Travel Start | Travel End |
+----------------+--------------+------------+
 
 
 
+-------------------------------------+
| No recent travel history available. |
+-------------------------------------+
 documented as of this encounter
 
 Plan of Treatment
 Not on filedocumented as of this encounter
 
 Visit Diagnoses
 Not on filedocumented in this encounter

## 2020-06-03 NOTE — XMS
Encounter Summary
  Created on: 2020
 
 Isak Hilario
 External Reference #: [id number]
 : 30
 Sex: Male
 
 Demographics
 
 
+-----------------------+----------------------+
| Address               | [address]         |
|                       | [place]  [address] |
+-----------------------+----------------------+
| Home Phone            | [phone]      |
+-----------------------+----------------------+
| Preferred Language    | Unknown              |
+-----------------------+----------------------+
| Marital Status        |               |
+-----------------------+----------------------+
| Oriental orthodox Affiliation | Unknown              |
+-----------------------+----------------------+
| Race                  | Unknown              |
+-----------------------+----------------------+
| Ethnic Group          | Unknown              |
+-----------------------+----------------------+
 
 
 Author
 
 
+--------------+--------------------------------------------+
| Author       | [facility]  |
|              | and [place]                                |
+--------------+--------------------------------------------+
| Organization | [facility]  |
|              | and [place]                                |
+--------------+--------------------------------------------+
| Address      | Unknown                                    |
+--------------+--------------------------------------------+
| Phone        | Unavailable                                |
+--------------+--------------------------------------------+
 
 
 
 Support
 
 
+-------------+--------------+---------+-----------------+
| Name        | Relationship | Address | Phone           |
+-------------+--------------+---------+-----------------+
| Alessandra Saxena | ECON         | Unknown | [phone] |
+-------------+--------------+---------+-----------------+
 
 
 
 Care Team Providers
 
 
 
+-----------------------+------+-----------------+
| Care Team Member Name | Role | Phone           |
+-----------------------+------+-----------------+
| Bari Ha MD | PCP  | [phone] |
+-----------------------+------+-----------------+
 
 
 
 Reason for Visit
 
 
+----------------------+----------+
| Reason               | Comments |
+----------------------+----------+
| Coordination Of Care |          |
+----------------------+----------+
 
 
 
 Encounter Details
 
 
+--------+-----------+----------------------+--------------------+----------------------+
| Date   | Type      | Department           | Care Team          | Description          |
+--------+-----------+----------------------+--------------------+----------------------+
| / | Telephone |   [facility]  |   Peter Hess  | Coordination Of Care |
| 2020   |           | SURGERY  [address]   | [address]    |                      |
|        |           | St  [place], [place]  | [address]  [facility]    |                      |
|        |           | [address]           | [place] [address]    |                      |
|        |           | [phone]         | [phone]       |                      |
|        |           |                      | [phone] (Fax) |                      |
+--------+-----------+----------------------+--------------------+----------------------+
 
 
 
 Social History
 
 
+--------------+-------+-----------+--------+------+
| Tobacco Use  | Types | Packs/Day | Years  | Date |
|              |       |           | Used   |      |
+--------------+-------+-----------+--------+------+
| Never Smoker |       |           |        |      |
+--------------+-------+-----------+--------+------+
 
 
 
+---------------------+------+---+----------+
| Smokeless Tobacco:  | Chew |   | Quit:    |
| Former User         |      |   | 19 |
|                     |      |   | 85       |
+---------------------+------+---+----------+
 
 
 
+-------------+-------------+---------+----------+
| Alcohol Use | Drinks/Week | oz/Week | Comments |
+-------------+-------------+---------+----------+
 
| Not Asked   |             |         |          |
+-------------+-------------+---------+----------+
 
 
 
+------------------+---------------+
| Sex Assigned at  | Date Recorded |
| Birth            |               |
+------------------+---------------+
| Not on file      |               |
+------------------+---------------+
 
 
 
+----------------+-------------+-------------+
| Job Start Date | Occupation  | Industry    |
+----------------+-------------+-------------+
| Not on file    | Not on file | Not on file |
+----------------+-------------+-------------+
 
 
 
+----------------+--------------+------------+
| Travel History | Travel Start | Travel End |
+----------------+--------------+------------+
 
 
 
+-------------------------------------+
| No recent travel history available. |
+-------------------------------------+
 documented as of this encounter
 
 Plan of Treatment
 Not on filedocumented as of this encounter
 
 Visit Diagnoses
 Not on filedocumented in this encounter

## 2020-06-03 NOTE — XMS
Encounter Summary
  Created on: 2020
 
 Isak Hilario
 External Reference #: [id number]
 : 30
 Sex: Male
 
 Demographics
 
 
+-----------------------+----------------------+
| Address               | [address]         |
|                       | [place]  [address] |
+-----------------------+----------------------+
| Home Phone            | [phone]      |
+-----------------------+----------------------+
| Preferred Language    | Unknown              |
+-----------------------+----------------------+
| Marital Status        |               |
+-----------------------+----------------------+
| Restoration Affiliation | Unknown              |
+-----------------------+----------------------+
| Race                  | Unknown              |
+-----------------------+----------------------+
| Ethnic Group          | Unknown              |
+-----------------------+----------------------+
 
 
 Author
 
 
+--------------+--------------------------------------------+
| Author       | [facility]  |
|              | and [place]                                |
+--------------+--------------------------------------------+
| Organization | [facility]  |
|              | and [place]                                |
+--------------+--------------------------------------------+
| Address      | Unknown                                    |
+--------------+--------------------------------------------+
| Phone        | Unavailable                                |
+--------------+--------------------------------------------+
 
 
 
 Support
 
 
+-------------+--------------+---------+-----------------+
| Name        | Relationship | Address | Phone           |
+-------------+--------------+---------+-----------------+
| Alessandra Saxena | ECON         | Unknown | [phone] |
+-------------+--------------+---------+-----------------+
 
 
 
 Care Team Providers
 
 
 
+-----------------------+------+-----------------+
| Care Team Member Name | Role | Phone           |
+-----------------------+------+-----------------+
| Bari Ha MD | PCP  | [phone] |
+-----------------------+------+-----------------+
 
 
 
 Reason for Visit
 
 
+------------+----------------------+
| Reason     | Comments             |
+------------+----------------------+
| Wrist Pain | Bilateral wrist pain |
+------------+----------------------+
| Numbness   |                      |
+------------+----------------------+
 
 
 
 Encounter Details
 
 
+--------+---------+---------------------+----------------------+--------------------+
| Date   | Type    | Department          | Care Team            | Description        |
+--------+---------+---------------------+----------------------+--------------------+
| / | Office  |   [facility]         |   Ed Patel   | Carpal tunnel      |
|    | Visit   | PHYSIATRY  [address]    | T, MD  [address]  | syndrome (Primary  |
|        |         | [address]   | [place]  [place]  | Dx); DEGENERATIVE  |
|        |         | [place], [place]     | [address]  [phone]  | DISC DISEASE,      |
|        |         | [address]          |  [phone] (Fax)  | CERVICAL SPINE;    |
|        |         | [phone]        |                      | Diabetes mellitus  |
|        |         |                     |                      | (HCC); Diabetic    |
|        |         |                     |                      | peripheral         |
|        |         |                     |                      | neuropathy ([facility]);  |
|        |         |                     |                      | Paresthesia        |
+--------+---------+---------------------+----------------------+--------------------+
 
 
 
 Social History
 
 
+--------------+-------+-----------+--------+------+
| Tobacco Use  | Types | Packs/Day | Years  | Date |
|              |       |           | Used   |      |
+--------------+-------+-----------+--------+------+
| Never Smoker |       |           |        |      |
+--------------+-------+-----------+--------+------+
 
 
 
+-------------+-------------+---------+----------+
| Alcohol Use | Drinks/Week | oz/Week | Comments |
+-------------+-------------+---------+----------+
| Not Asked   |             |         |          |
+-------------+-------------+---------+----------+
 
 
 
 
+------------------+---------------+
| Sex Assigned at  | Date Recorded |
| Birth            |               |
+------------------+---------------+
| Not on file      |               |
+------------------+---------------+
 
 
 
+----------------+-------------+-------------+
| Job Start Date | Occupation  | Industry    |
+----------------+-------------+-------------+
| Not on file    | Not on file | Not on file |
+----------------+-------------+-------------+
 
 
 
+----------------+--------------+------------+
| Travel History | Travel Start | Travel End |
+----------------+--------------+------------+
 
 
 
+-------------------------------------+
| No recent travel history available. |
+-------------------------------------+
 documented as of this encounter
 
 Last Filed Vital Signs
 
 
+-------------------+------------------+----------------------+----------+
| Vital Sign        | Reading          | Time Taken           | Comments |
+-------------------+------------------+----------------------+----------+
| Blood Pressure    | 128/85           | 2013 10:52 AM  |          |
|                   |                  | PST                  |          |
+-------------------+------------------+----------------------+----------+
| Pulse             | 69               | 2013 10:52 AM  |          |
|                   |                  | PST                  |          |
+-------------------+------------------+----------------------+----------+
| Temperature       | -                | -                    |          |
+-------------------+------------------+----------------------+----------+
| Respiratory Rate  | -                | -                    |          |
+-------------------+------------------+----------------------+----------+
| Oxygen Saturation | -                | -                    |          |
+-------------------+------------------+----------------------+----------+
| Inhaled Oxygen    | -                | -                    |          |
| Concentration     |                  |                      |          |
+-------------------+------------------+----------------------+----------+
| Weight            | 95.3 kg (210 lb) | 2013 10:52 AM  |          |
|                   |                  | PST                  |          |
+-------------------+------------------+----------------------+----------+
| Height            | 165.1 cm (5' 5") | 2013 10:52 AM  |          |
|                   |                  | PST                  |          |
+-------------------+------------------+----------------------+----------+
| Body Mass Index   | 34.95            | 2013 10:52 AM  |          |
|                   |                  | PST                  |          |
 
+-------------------+------------------+----------------------+----------+
 documented in this encounter
 
 Patient Instructions
 Patient Instructions Ed Patel MD - 2013  6:20 AM PSTIce the area as needed
 for 15-20 minutes several times over the next few days.  Watch for signs of infection.  Bryan
l with questions or concerns.
 Electronically signed by Ed Patel MD at 2013  6:20 AM PST
 documented in this encounter
 
 Progress Notes
 Ed Patel MD - 2013 10:57 AM PSTFormatting of this note might be different 
from the original.
 Subjective: 
  
 Patient ID: Isak Hilario is a 82 y.o. male.
 Chief Complaint 
 Patient presents with 
   Wrist Pain 
   Bilateral wrist pain 
   Numbness 
 
 HPI The patient is being seen today in follow-up for complaints of bilateral wrist pain as 
well as numbness in the first three digits on both hands. The patient had prior carpal tunne
l releases in .  He reports that for several years after he had improvement of his sympt
oms.  The symptoms then started to reoccur approximately 3 years ago.  He was having signifi
cant neck pain at that time as well and x-rays and an MRI were ordered.  He was found to hav
e significant DDD and some cervical stenosis.  He then had EMG and nerve conduction studies 
by Dr. Smith Acuña which showed severe residual or recurrent carpal tunnel syndrome. He did n
ot mention any evidence of cervical radiculopathy.  He did have some evidence of a periphera
l neuropathy. After the EMG he was then referred to me for potential carpal tunnel injection
s.  I felt it would be in his best interest to try carpal tunnel splints first.  He has trie
d them and unfortunately they did not help.  
 He describes his symptoms as burning, uncomfortable numbness.  The discomfort is fairly con
stant and not necessarily activity related.  He does also have significant stiffness in a lo
t of his joints including his wrists. 
 Treatments for these complaints have included previous carpal tunnel release in , as we
ll as the carpal tunnel splints. (PT, injections, medications, chiropractic, TENS unit, surg
eries, etc...)
 Imaging of the cervical spine, including an MRI were reviewed today in detail with the all
ent.  I did also personally review the EMG report from Dr. Acuña.
 
 Patient's medications, allergies, past medical, surgical, social and family histories were 
reviewed and updated as appropriate.
 
 Review of Systems
 The patient reported only the joint stiffness and numbness/dysesthesias as above.
 Objective: 
 Physical Exam 
 Nursing note and vitals reviewed.
 Constitutional: He is oriented to person, place, and time. He appears well-developed and we
ll-nourished. 
 HENT: 
 Head: Normocephalic and atraumatic. 
 Neck: No tracheal deviation present. 
 Cardiovascular: Normal rate and regular rhythm.  
 Pulmonary/Chest: Effort normal and breath sounds normal. No respiratory distress. 
 Lymphadenopathy: 
   He has no cervical adenopathy. 
 Neurological: He is alert and oriented to person, place, and time. He has normal strength. 
 
No cranial nerve deficit or sensory deficit. He displays no Babinski's sign on the right david
e. He displays no Babinski's sign on the left side. 
 Skin: Skin is warm, dry and intact. No abrasion, no bruising, no ecchymosis, no laceration 
and no rash noted. 
 Psychiatric: He has a normal mood and affect. His speech is normal and behavior is normal. 
Thought content normal. Cognition and memory are normal. 
 Musculoskeletal: Strength testing in bilateral upper extremities showed 5/5 strength with n
o focal weakness.  Range of motion testing of the cervical spine showed limitations in all p
lanes but was fairly pain free. Spurling sign was negative.  He did not have positive Tinel'
s or Phalen's sign.  
 
 Assessment: 
 
 1. Carpal tunnel syndrome  
 2. DDD cervical spine 
 3. Diabetes mellitus  
 4. Diabetic peripheral neuropathy  
 5. Paresthesia - it is unclear which of the above issues is causing his current symptoms.  
It is quite possibly a combination of all of the above. 
 
 Plan: 
 
 1.  I did review the EMG report by Dr. Nils Acuña.  Certainly, based on the numbers, th
e recurrent or residual carpal tunnel syndrome seems to be the greatest source of the patien
dora's current symptoms or at least a significant contributing source.  It has been suggested t
hat the patient may benefit from carpal tunnel injections and the patient does wish to pursu
e this option and consented to the procedure on both wrists today.
 
 2. Description of procedure: Bilateral carpal tunnel injections were performed using ultras
ound guidance.  
 
 After the patient gave consent for the procedure the area for the left wrist injection was 
located by direct ultrasound visualization.  Then the area was sterilized with chlorhexidine
 scrub and a sterile drape was applied. A sterile probe cover and sterile ultrasound gel was
 applied to the probe.  Then under direct ultrasound guidance a 27-gauge 1-1/2 inch needle w
as inserted down into the carpal tunnel just deep to the median nerve.  A total volume of 2 
mL including 1 mL of triamcinolone ( 40 milligrams per mL) and 1 mL of 1% lidocaine was then
 infused. Pictures were taken to document needle placement.  The needle was then removed and
 the area was cleaned and bandaged. The procedure was then repeated in the same manner on th
e right.  The patient tolerated the procedure well. There were no complications.  The patien
dora was instructed to ice the area for 15-20 minutes several times over the next few days and 
to watch for any signs of infection.
 
 3.  The above procedures may prove both therapeutic and diagnostic.  I will contact the pat
ient in a few weeks to see how he responded to the procedure and further plan of care will b
e determined at that time.
 
 Electronically signed by Ed Patel MD at 2013  6:24 AM PSTdocumented in this
 encounter
 
 Plan of Treatment
 Not on filedocumented as of this encounter
 
 Visit Diagnoses
 
 
+------------------------------------------------------------------------------------------+
| Diagnosis                                                                                |
+------------------------------------------------------------------------------------------+
|   Carpal tunnel syndrome - Primary                                                       |
 
+------------------------------------------------------------------------------------------+
|   DEGENERATIVE DISC DISEASE, CERVICAL SPINE  Degeneration of cervical intervertebral     |
| disc                                                                                     |
+------------------------------------------------------------------------------------------+
|   Diabetes mellitus (HCC)  Type II or unspecified type diabetes mellitus without mention |
|  of complication, not stated as uncontrolled                                             |
+------------------------------------------------------------------------------------------+
|   Diabetic peripheral neuropathy (HCC)  Type II or unspecified type diabetes mellitus    |
| with neurological manifestations, not stated as uncontrolled                             |
+------------------------------------------------------------------------------------------+
|   Paresthesia  Disturbance of skin sensation                                             |
+------------------------------------------------------------------------------------------+
 documented in this encounter

## 2020-06-03 NOTE — XMS
Encounter Summary
  Created on: 2020
 
 sIak Hilario
 External Reference #: [id number]
 : 30
 Sex: Male
 
 Demographics
 
 
+-----------------------+------------------------+
| Address               | [address]       |
|                       | [place]  [address]   |
+-----------------------+------------------------+
| Home Phone            | [phone]        |
+-----------------------+------------------------+
| Preferred Language    | Unknown                |
+-----------------------+------------------------+
| Marital Status        |                 |
+-----------------------+------------------------+
| Voodoo Affiliation | NON                    |
+-----------------------+------------------------+
| Race                  | White                  |
+-----------------------+------------------------+
| Ethnic Group          | Not  or  |
+-----------------------+------------------------+
 
 
 Author
 
 
+--------------+------------------------------+
| Author       | [facility] |
+--------------+------------------------------+
| Organization | [facility] |
+--------------+------------------------------+
| Address      | Unknown                      |
+--------------+------------------------------+
| Phone        | Unavailable                  |
+--------------+------------------------------+
 
 
 
 Support
 
 
+-------------------+--------------+-------------------+-----------------+
| Name              | Relationship | Address           | Phone           |
+-------------------+--------------+-------------------+-----------------+
| Jackie Hilario | ECON         | [address]      | [phone] |
|                   |              | [place], [place]   |                 |
|                   |              | [id number]             |                 |
+-------------------+--------------+-------------------+-----------------+
 
 
 
 Care Team Providers
 
 
 
+-----------------------+------+-------------+
| Care Team Member Name | Role | Phone       |
+-----------------------+------+-------------+
 PCP  | Unavailable |
+-----------------------+------+-------------+
 
 
 
 Encounter Details
 
 
+--------+----------+----------------------+--------------------+-------------+
| Date   | Type     | Department           | Care Team          | Description |
+--------+----------+----------------------+--------------------+-------------+
| / | Results  |   [facility]  [address]  |   [facility]   |             |
|    | Only     | [address]  | [phone]       |             |
|        |          |  [place]        |                    |             |
|        |          | [id number]           |                    |             |
|        |          | [phone]         |                    |             |
+--------+----------+----------------------+--------------------+-------------+
 
 
 
 Social History
 
 
+----------------+-------+-----------+--------+------+
| Tobacco Use    | Types | Packs/Day | Years  | Date |
|                |       |           | Used   |      |
+----------------+-------+-----------+--------+------+
| Never Assessed |       |           |        |      |
+----------------+-------+-----------+--------+------+
 
 
 
+------------------+---------------+
| Sex Assigned at  | Date Recorded |
| Birth            |               |
+------------------+---------------+
| Not on file      |               |
+------------------+---------------+
 
 
 
+----------------+-------------+-------------+
| Job Start Date | Occupation  | Industry    |
+----------------+-------------+-------------+
| Not on file    | Not on file | Not on file |
+----------------+-------------+-------------+
 
 
 
+----------------+--------------+------------+
| Travel History | Travel Start | Travel End |
+----------------+--------------+------------+
 
 
 
 
+-------------------------------------+
| No recent travel history available. |
+-------------------------------------+
 documented as of this encounter
 
 Plan of Treatment
 Not on filedocumented as of this encounter
 
 Procedures
 
 
+--------------------+--------+------------+----------------------+----------------------+
| Procedure Name     | Priori | Date/Time  | Associated Diagnosis | Comments             |
|                    | ty     |            |                      |                      |
+--------------------+--------+------------+----------------------+----------------------+
| SURGICAL PATHOLOGY | Routin | 1998 |                      |   Results for this   |
|                    | e      |            |                      | procedure are in the |
|                    |        |            |                      |  results section.    |
+--------------------+--------+------------+----------------------+----------------------+
 documented in this encounter
 
 Results
 SURGICAL PATHOLOGY (1998)
 
+-----------+--------------------------+-----------+-------------+--------------+
| Component | Value                    | Ref Range | Performed   | Pathologist  |
|           |                          |           | At          | Signature    |
+-----------+--------------------------+-----------+-------------+--------------+
| SURGICAL  | SOURCE OF SPECIMEN: SEE  |           | [facility]        |              |
| PATHOLOGY | RESULTS                  |           | DEPARTMENT  |              |
|           | Preliminary              |           | OF          |              |
|           | History:FROZEN SECTION   |           | PATHOLOGY   |              |
|           | DIAGNOSISRIGHT PELVIC    |           |             |              |
|           | NODE (SPECIMEN #1):   NO |           |             |              |
|           |  TUMOR SEENLEFT PELVIC   |           |             |              |
|           | NODE (SPECIMEN #2):   NO |           |             |              |
|           |  TUMOR SEEN              |           |             |              |
|           | Confirmed by: Bhavana       |           |             |              |
|           | BILL Leigh and Surya    |           |             |              |
|           | BILL Gonzalez           |           |             |              |
|           | CLINICAL HISTORY         |           |             |              |
|           | Patient Age: 67 year old |           |             |              |
|           |  man       Patient       |           |             |              |
|           | History: Prostate        |           |             |              |
|           | specific antigens (PSA)  |           |             |              |
|           | of 27 and high           |           |             |              |
|           | gradeadenocarcinoma of   |           |             |              |
|           | the prostate found by    |           |             |              |
|           | physical exam to have a  |           |             |              |
|           | nodule.Biopsy revealed   |           |             |              |
|           | extensive tumor. Rule    |           |             |              |
|           | out tumor extension      |           |             |              |
|           | beyond thecapsule and/or |           |             |              |
|           |  at the surgical         |           |             |              |
|           | margins.       GROSS     |           |             |              |
|           | DESCRIPTION              |           |             |              |
|           | Specimens received:      |           |             |              |
|           |   Two fresh, one in      |           |             |              |
|           | formalin       #1 RIGHT  |           |             |              |
|           | PELVIC NODE (FS):        |           |             |              |
 
|           | Received fresh are       |           |             |              |
|           | several                  |           |             |              |
|           | unorientedfragments of   |           |             |              |
|           | fibroadipose tissue      |           |             |              |
|           | measuring 5.5 x 2.7 x    |           |             |              |
|           | 1.5 cm. in               |           |             |              |
|           | looseaggregate. These    |           |             |              |
|           | tissues are sectioned    |           |             |              |
|           | and a representative     |           |             |              |
|           | lymph nodeis used for    |           |             |              |
|           | frozen section           |           |             |              |
|           | diagnosis. The remaining |           |             |              |
|           |  tissue fragments        |           |             |              |
|           | aresectioned.       #2   |           |             |              |
|           | LEFT PELVIS NODE (FS):   |           |             |              |
|           | Received fresh are       |           |             |              |
|           | several fragments        |           |             |              |
|           | ofunoriented             |           |             |              |
|           | fibroadipose tissue      |           |             |              |
|           | measuring 5.6 x 4.9 x    |           |             |              |
|           | 2.4 cm. in               |           |             |              |
|           | looseaggregate. These    |           |             |              |
|           | tissue fragments are     |           |             |              |
|           | sectioned.       #3      |           |             |              |
|           | PROSTATE: Received is a  |           |             |              |
|           | prostatectomy specimen   |           |             |              |
|           | weighing 52 g.           |           |             |              |
|           | andmeasuring 6 x 4.5 x   |           |             |              |
|           | 3.5 cm.       The        |           |             |              |
|           | attached portion of left |           |             |              |
|           |  seminal vesicle         |           |             |              |
|           | measures 2 x 1 x 1 cm.   |           |             |              |
|           | andthe left vas deferens |           |             |              |
|           |  measures 3 cm. in       |           |             |              |
|           | length by 0.5 cm. in     |           |             |              |
|           | diameter.The attached    |           |             |              |
|           | portion of right seminal |           |             |              |
|           |  vesicle measures 2.5 x  |           |             |              |
|           | 1 x 1 cm.and the right   |           |             |              |
|           | vas deferens measures    |           |             |              |
|           | 2.6 cm. in length by 0.5 |           |             |              |
|           |  cm. indiameter.         |           |             |              |
|           |       The left half of   |           |             |              |
|           | the specimen is inked    |           |             |              |
|           | black and the right half |           |             |              |
|           |  is inkedblue. The       |           |             |              |
|           | specimen is serially     |           |             |              |
|           | sectioned and notable    |           |             |              |
|           | for a tan                |           |             |              |
|           | poorlycircumscribed area |           |             |              |
|           |  along the middle left   |           |             |              |
|           | lobe which measures 1.7  |           |             |              |
|           | cm. ingreatest           |           |             |              |
|           | dimension. There is an   |           |             |              |
|           | underlying circumscribed |           |             |              |
|           |  white nodule ofpossible |           |             |              |
|           |  benign prostatic        |           |             |              |
|           | hypertrophy deep to this |           |             |              |
|           |  area measuring 1.5cm.   |           |             |              |
|           | in greatest diameter.    |           |             |              |
 
|           |     CASSETTE INDEX#1     |           |             |              |
|           | RIGHT PELVIC NODE        |           |             |              |
|           | (FS):1A, tissue used for |           |             |              |
|           |  frozen section          |           |             |              |
|           | diagnosis, resubmitted,  |           |             |              |
|           | RS1B, all possible lymph |           |             |              |
|           |  nodes, RS#2 LEFT PELVIS |           |             |              |
|           |  NODE (FS):2A, tissue    |           |             |              |
|           | used for frozen section  |           |             |              |
|           | diagnosis, resubmitted,  |           |             |              |
|           | RS2B, all possible lymph |           |             |              |
|           |  nodes, RS#3             |           |             |              |
|           | PROSTATE:3A-B, apical    |           |             |              |
|           | urethral margin, RS3C-D, |           |             |              |
|           |  surgical margin at      |           |             |              |
|           | base, RS3E-F, complete   |           |             |              |
|           | representative section   |           |             |              |
|           | of prostatic [id number],   |           |             |              |
|           | right and left middle    |           |             |              |
|           | lobes including area of  |           |             |              |
|           | possible tumor, RS3J-K,  |           |             |              |
|           | prostatic base, RS3L-M,  |           |             |              |
|           | seminal vesicles and vas |           |             |              |
|           |  deferens, RS       All  |           |             |              |
|           | tissue sections taken    |           |             |              |
|           | are submitted for        |           |             |              |
|           | microscopic              |           |             |              |
|           | evaluation.WSS/KHADRA:tg     |           |             |              |
|           |           SYNOPTIC       |           |             |              |
|           | DESCRIPTION - PROSTATE,  |           |             |              |
|           | RADICAL PROSTATECTOMY    |           |             |              |
|           | FOR CARCINOMA            |           |             |              |
|           | TUMOR TYPE:              |           |             |              |
|           |   Adenocarcinoma of      |           |             |              |
|           | prostateGLEASON'S SCORE  |           |             |              |
|           | 3/5+4/5 = 7/10TUMOR      |           |             |              |
|           | LOCATION:   [facility]:    |           |             |              |
|           |     RIGHT       LEFT     |           |             |              |
|           |                          |           |             |              |
|           |                   [place]   |           |             |              |
|           |         [facility]             |           |             |              |
|           |                          |           |             |              |
|           |           Mid            |           |             |              |
|           | Mid                      |           |             |              |
|           |                          |           |             |              |
|           |  BaseVASCULAR INVASION:  |           |             |              |
|           |                          |           |             |              |
|           |   NOPERINEURAL INVASION: |           |             |              |
|           |                          |           |             |              |
|           |   YESTUMOR INVADES TO    |           |             |              |
|           | PROSTATIC APEX:          |           |             |              |
|           |   NOTUMOR INVADES INTO   |           |             |              |
|           | PROSTATIC CAPSULE, BUT   |           |             |              |
|           | NOT BEYOND:              |           |             |              |
|           |   YESSTAGING:            |           |             |              |
|           |   Extracapsular          |           |             |              |
|           | extension                |           |             |              |
|           |                          |           |             |              |
|           |        (Stage pT3a)      |           |             |              |
|           |           SURGICAL       |           |             |              |
 
|           | MARGINS:   Tumor present |           |             |              |
|           |  at surgical margin:     |           |             |              |
|           |       YES   Specify      |           |             |              |
|           | site: Left               |           |             |              |
|           | midNON-NEOPLASTIC        |           |             |              |
|           | PROSTATE:                |           |             |              |
|           |   Unremarkable:    NO    |           |             |              |
|           |   Abnormalities:         |           |             |              |
|           |      Nodular             |           |             |              |
|           | hyperplasiaLYMPH NODES:  |           |             |              |
|           |   Involved nodes/total   |           |             |              |
|           | nodes:   0/9       FINAL |           |             |              |
|           |  DIAGNOSIS#1 RIGHT       |           |             |              |
|           | PELVIC NODE:   FOUR      |           |             |              |
|           | LYMPH NODES WITH NO      |           |             |              |
|           | TUMOR SEEN (0/4)#2 LEFT  |           |             |              |
|           | PELVIS NODE:   FIVE      |           |             |              |
|           | LYMPH NODES WITH NO      |           |             |              |
|           | TUMOR SEEN (0/5)#3       |           |             |              |
|           | PROSTATE:   PROSTATIC    |           |             |              |
|           | ADENOCARCINOMA,          |           |             |              |
|           | ALINE'S GRADE 3/5+4/5, |           |             |              |
|           |  WITH PERINEURAL         |           |             |              |
|           |   INVASION   (SEE NOTE)  |           |             |              |
|           |       Note:   The tumor  |           |             |              |
|           | involves both lobes and  |           |             |              |
|           | is present at or near    |           |             |              |
|           | thecapsular margin (left |           |             |              |
|           |  mid, sections 3G and    |           |             |              |
|           | 3F).       Case reviewed |           |             |              |
|           |  by:   ELSA Das,  |           |             |              |
|           | Student FellowCase       |           |             |              |
|           | staffed by:   Jt TUCKER     |           |             |              |
|           | Sailaja,               |           |             |              |
|           | M.D.T:98/       My |           |             |              |
|           |  electronic signature    |           |             |              |
|           | indicates that I have    |           |             |              |
|           | personally reviewed      |           |             |              |
|           | alldiagnostic slides,    |           |             |              |
|           | the gross and/or         |           |             |              |
|           | microscopic portion of   |           |             |              |
|           | thisreport and           |           |             |              |
|           | formulated the final     |           |             |              |
|           | diagnosis.               |           |             |              |
+-----------+--------------------------+-----------+-------------+--------------+
 
 
 
+----------+
| Specimen |
+----------+
| Other    |
+----------+
 
 
 
+----------------------+------------------------+----------------------+--------------+
| Performing           | Address                | City/State/Zipcode   | Phone Number |
| Organization         |                        |                      |              |
+----------------------+------------------------+----------------------+--------------+
 
|   [facility] |   [address]  |   [place] [address] |              |
|  PATHOLOGY           | [address]                |                      |              |
+----------------------+------------------------+----------------------+--------------+
 documented in this encounter
 
 Visit Diagnoses
 Not on filedocumented in this encounter

## 2020-06-03 NOTE — XMS
Encounter Summary
  Created on: 2020
 
 Isak Hilario
 External Reference #: [id number]
 : 30
 Sex: Male
 
 Demographics
 
 
+-----------------------+----------------------+
| Address               | [address]         |
|                       | [place]  [address] |
+-----------------------+----------------------+
| Home Phone            | [phone]      |
+-----------------------+----------------------+
| Preferred Language    | Unknown              |
+-----------------------+----------------------+
| Marital Status        |               |
+-----------------------+----------------------+
| Alevism Affiliation | Unknown              |
+-----------------------+----------------------+
| Race                  | Unknown              |
+-----------------------+----------------------+
| Ethnic Group          | Unknown              |
+-----------------------+----------------------+
 
 
 Author
 
 
+--------------+--------------------------------------------+
| Author       | [facility]  |
|              | and [place]                                |
+--------------+--------------------------------------------+
| Organization | [facility]  |
|              | and [place]                                |
+--------------+--------------------------------------------+
| Address      | Unknown                                    |
+--------------+--------------------------------------------+
| Phone        | Unavailable                                |
+--------------+--------------------------------------------+
 
 
 
 Support
 
 
+-------------+--------------+---------+-----------------+
| Name        | Relationship | Address | Phone           |
+-------------+--------------+---------+-----------------+
| Alessandra Saxena | ECON         | Unknown | [phone] |
+-------------+--------------+---------+-----------------+
 
 
 
 Care Team Providers
 
 
 
+-----------------------+------+-----------------+
| Care Team Member Name | Role | Phone           |
+-----------------------+------+-----------------+
| Bari Ha MD | PCP  | [phone] |
+-----------------------+------+-----------------+
 
 
 
 Reason for Visit
 
 
+--------+----------+
| Reason | Comments |
+--------+----------+
| Apnea  |          |
+--------+----------+
 
 
 
 Encounter Details
 
 
+--------+---------+----------------------+----------------------+----------------------+
| Date   | Type    | Department           | Care Team            | Description          |
+--------+---------+----------------------+----------------------+----------------------+
| / | Office  |   [facility]      |   Kyree Smith PA  | MISTI on CPAP (Primary |
| 2014   | Visit   | SLEEP DISORDER  [address]  |  [address]     |  Dx)                 |
|        |         | [address]      | [place]      |                      |
|        |         | [place] [address] | [address]  [phone]  |                      |
|        |         |   [phone]       |  [phone] (Fax)  |                      |
+--------+---------+----------------------+----------------------+----------------------+
 
 
 
 Social History
 
 
+--------------+-------+-----------+--------+------+
| Tobacco Use  | Types | Packs/Day | Years  | Date |
|              |       |           | Used   |      |
+--------------+-------+-----------+--------+------+
| Never Smoker |       |           |        |      |
+--------------+-------+-----------+--------+------+
 
 
 
+-------------+-------------+---------+----------+
| Alcohol Use | Drinks/Week | oz/Week | Comments |
+-------------+-------------+---------+----------+
| Not Asked   |             |         |          |
+-------------+-------------+---------+----------+
 
 
 
+------------------+---------------+
| Sex Assigned at  | Date Recorded |
| Birth            |               |
+------------------+---------------+
 
| Not on file      |               |
+------------------+---------------+
 
 
 
+----------------+-------------+-------------+
| Job Start Date | Occupation  | Industry    |
+----------------+-------------+-------------+
| Not on file    | Not on file | Not on file |
+----------------+-------------+-------------+
 
 
 
+----------------+--------------+------------+
| Travel History | Travel Start | Travel End |
+----------------+--------------+------------+
 
 
 
+-------------------------------------+
| No recent travel history available. |
+-------------------------------------+
 documented as of this encounter
 
 Last Filed Vital Signs
 
 
+-------------------+----------------------+----------------------+----------+
| Vital Sign        | Reading              | Time Taken           | Comments |
+-------------------+----------------------+----------------------+----------+
| Blood Pressure    | 122/66               | 2014 10:47 AM  |          |
|                   |                      | PDT                  |          |
+-------------------+----------------------+----------------------+----------+
| Pulse             | 110                  | 2014 10:47 AM  |          |
|                   |                      | PDT                  |          |
+-------------------+----------------------+----------------------+----------+
| Temperature       | -                    | -                    |          |
+-------------------+----------------------+----------------------+----------+
| Respiratory Rate  | 16                   | 2014 10:47 AM  |          |
|                   |                      | PDT                  |          |
+-------------------+----------------------+----------------------+----------+
| Oxygen Saturation | 96%                  | 2014 10:47 AM  |          |
|                   |                      | PDT                  |          |
+-------------------+----------------------+----------------------+----------+
| Inhaled Oxygen    | -                    | -                    |          |
| Concentration     |                      |                      |          |
+-------------------+----------------------+----------------------+----------+
| Weight            | 99.8 kg (220 lb 1.6  | 2014 10:47 AM  |          |
|                   | oz)                  | PDT                  |          |
+-------------------+----------------------+----------------------+----------+
| Height            | -                    | -                    |          |
+-------------------+----------------------+----------------------+----------+
| Body Mass Index   | 34.47                | 2013 11:18 AM  |          |
|                   |                      | PST                  |          |
+-------------------+----------------------+----------------------+----------+
 documented in this encounter
 
 Progress Notes
 Kyree Smith PA - 2014 10:54 AM PDTFormatting of this note might be different from 
the original.
 
 Subjective: 
  
 Patient ID: Isak Hilario is a 83 y.o. male.
 
 HPI
 
 last office visit was:  10/29/2010
 date of polysomnography: 1999 
 AHI: 
 RDI: 99.7
 O2%: 87% 
 Machine type: [employer] with nasal mask
 obtained from:  [facility] [place] 
 pressure: 12 cm
 Nights using CPAP: 254/254
 % of nights >4 hours:  99%
 average usage (all nights): 7.6
 average usage (nights used): 7.6
 AHI: n/a
 
 Isak comes in for CPAP compliance.  He continues to have excellent compliance, wearing his
 CPAP on a nightly basis for the duration of his sleep.  He cannot sleep without it.  His cu
rrent CPAP appears to be nearly nine years old.  He would likely benefit from using a variab
le pressure machine.  His pressure has not been checked since .  He feels that his apnea
 is we controlled and his machine is still working well.  He will consider changing it when 
necessary.  We discussed when he is able to replace his equipment.  I gave him a handout lizzy
t shows when his insurance will accept him replacing each item.  We also discussed the recom
mended cleaning schedule for his equipment.   He cleans his equipment regularly and replaces
 his mask pretty consistently, but has not replaced his heated tube in several years.
 
 I have discussed the download and results of the paperwork in detail.  He is unchanged or i
mproved in nearly all categories, with no areas of concern.  The download shows that his cornelius
ks are well controlled. 
 
  
 
 Review of Systems
 
 Objective: 
 Physical Exam
 
 Assessment: 
 
 Problem #1: OBSTRUCTIVE SLEEP APNEA (327.23)
 This is controlled with CPAP.  His CPAP compliance is going well.  His machine is nearly ni
ne years old and could be replaced, but he feels that it is still working well.
 
 Plan: 
 
 He is to continue with CPAP indefinitely.  I have recommended that he replace his CPAP when
 he notices that it is not working properly.  I have also recommended that he touch base wit
h his medical supplier twice per year to ensure that his equipment is satisfactory. 
 
 I will follow up again in 2 years, sooner prn.  At that time we will reassess with all appr
opiate paperwork.  Thirty minutes were spent face-to-face, with the majority of time spent i
n counseling.
 
 Kyree Smith PA-C
 
 cc: 
 
 Bari Ha MD 
 
 Electronically signed by KWABENA Cleveland at 2014 12:47 PM Magaly Cai, Master 
of Arts - 2014 10:32 AM PDTFormatting of this note might be different from the origina
l.
  14 1000 
 Agrawal Depression Inventory-II 
 Depression Score 8 - Minimal depression 
 Insomnia Severity Index 
 Insomnia Severity Index 2  
 Oxford Sleepiness Scale 
 Sitting and reading 1 
 Watching TV 1 
 Sitting, inactive in a public place (e.g. a theatre or a meeting) 0 
 As a passenger in a car for an hour without a break 0 
 Lying down to rest in the afternoon when circumstances permit 1 
 Sitting and talking to someone 0 
 Sitting quietly after a lunch without alcohol 1 
 In a car, while stopped for a few minutes in traffic 0 
 Total score 4  
 SF-36v2  Score 
 PF 42.3  
 RP 39.71  
 BP 46.06  
 GH 50.55  
 VT 45.85  
 SF 56.85  
 RE 44.22  
 MH 55.64  
 PCS 41.7  
 MCS 54.51  
  Electronically signed by KWABENA Cleveland at 2014 12:47 PM PDTdocumented in this enc
ounter
 
 Plan of Treatment
 Not on filedocumented as of this encounter
 
 Visit Diagnoses
 
 
+----------------------------------------------------------------------+
| Diagnosis                                                            |
+----------------------------------------------------------------------+
|   MISTI on CPAP - Primary  Obstructive sleep apnea (adult) (pediatric) |
+----------------------------------------------------------------------+
 documented in this encounter

## 2020-06-03 NOTE — XMS
Encounter Summary
  Created on: 2020
 
 Isak Hilario
 External Reference #: [id number]
 : 30
 Sex: Male
 
 Demographics
 
 
+-----------------------+----------------------+
| Address               | [address]         |
|                       | [place]  [address] |
+-----------------------+----------------------+
| Home Phone            | [phone]      |
+-----------------------+----------------------+
| Preferred Language    | Unknown              |
+-----------------------+----------------------+
| Marital Status        |               |
+-----------------------+----------------------+
| Roman Catholic Affiliation | Unknown              |
+-----------------------+----------------------+
| Race                  | Unknown              |
+-----------------------+----------------------+
| Ethnic Group          | Unknown              |
+-----------------------+----------------------+
 
 
 Author
 
 
+--------------+--------------------------------------------+
| Author       | [facility]  |
|              | and [place]                                |
+--------------+--------------------------------------------+
| Organization | [facility]  |
|              | and [place]                                |
+--------------+--------------------------------------------+
| Address      | Unknown                                    |
+--------------+--------------------------------------------+
| Phone        | Unavailable                                |
+--------------+--------------------------------------------+
 
 
 
 Support
 
 
+-------------+--------------+---------+-----------------+
| Name        | Relationship | Address | Phone           |
+-------------+--------------+---------+-----------------+
| Alessandra Saxena | ECON         | Unknown | [phone] |
+-------------+--------------+---------+-----------------+
 
 
 
 Care Team Providers
 
 
 
+-----------------------+------+-------------+
| Care Team Member Name | Role | Phone       |
+-----------------------+------+-------------+
 PCP  | Unavailable |
+-----------------------+------+-------------+
 
 
 
 Encounter Details
 
 
+--------+----------+----------------------+----------------------+-------------+
| Date   | Type     | Department           | Care Team            | Description |
+--------+----------+----------------------+----------------------+-------------+
| / | Abstract |   [facility]  |   [facility] |             |
|    |          | Conversion Location  |  SR                  |             |
|        |          |  [address] [facility]         |                      |             |
|        |          | [place], [place]         |                      |             |
|        |          | [address]           |                      |             |
|        |          | [phone]         |                      |             |
+--------+----------+----------------------+----------------------+-------------+
 
 
 
 Social History
 
 
+----------------+-------+-----------+--------+------+
| Tobacco Use    | Types | Packs/Day | Years  | Date |
|                |       |           | Used   |      |
+----------------+-------+-----------+--------+------+
| Never Assessed |       |           |        |      |
+----------------+-------+-----------+--------+------+
 
 
 
+------------------+---------------+
| Sex Assigned at  | Date Recorded |
| Birth            |               |
+------------------+---------------+
| Not on file      |               |
+------------------+---------------+
 
 
 
+----------------+-------------+-------------+
| Job Start Date | Occupation  | Industry    |
+----------------+-------------+-------------+
| Not on file    | Not on file | Not on file |
+----------------+-------------+-------------+
 
 
 
+----------------+--------------+------------+
| Travel History | Travel Start | Travel End |
+----------------+--------------+------------+
 
 
 
 
+-------------------------------------+
| No recent travel history available. |
+-------------------------------------+
 documented as of this encounter
 
 Last Filed Vital Signs
 
 
+-------------------+---------------------+----------------------+----------+
| Vital Sign        | Reading             | Time Taken           | Comments |
+-------------------+---------------------+----------------------+----------+
| Blood Pressure    | 128/82              | 07/10/2012 12:00 AM  |          |
|                   |                     | PDT                  |          |
+-------------------+---------------------+----------------------+----------+
| Pulse             | -                   | -                    |          |
+-------------------+---------------------+----------------------+----------+
| Temperature       | -                   | -                    |          |
+-------------------+---------------------+----------------------+----------+
| Respiratory Rate  | -                   | -                    |          |
+-------------------+---------------------+----------------------+----------+
| Oxygen Saturation | -                   | -                    |          |
+-------------------+---------------------+----------------------+----------+
| Inhaled Oxygen    | -                   | -                    |          |
| Concentration     |                     |                      |          |
+-------------------+---------------------+----------------------+----------+
| Weight            | 95.3 kg (210 lb)    | 2012 12:00 AM  |          |
|                   |                     | PDT                  |          |
+-------------------+---------------------+----------------------+----------+
| Height            | 165.7 cm (5' 5.25") | 10/29/2010 12:00 AM  |          |
|                   |                     | PDT                  |          |
+-------------------+---------------------+----------------------+----------+
| Body Mass Index   | 34.68               | 10/29/2010 12:00 AM  |          |
|                   |                     | PDT                  |          |
+-------------------+---------------------+----------------------+----------+
 documented in this encounter
 
 Plan of Treatment
 Not on filedocumented as of this encounter
 
 Visit Diagnoses
 Not on filedocumented in this encounter

## 2020-06-03 NOTE — XMS
Encounter Summary
  Created on: 2020
 
 Isak Hilario
 External Reference #: [id number]
 : 30
 Sex: Male
 
 Demographics
 
 
+-----------------------+------------------------+
| Address               | [address]       |
|                       | [place]  [address]   |
+-----------------------+------------------------+
| Home Phone            | [phone]        |
+-----------------------+------------------------+
| Preferred Language    | Unknown                |
+-----------------------+------------------------+
| Marital Status        |                 |
+-----------------------+------------------------+
| Nondenominational Affiliation | NON                    |
+-----------------------+------------------------+
| Race                  | White                  |
+-----------------------+------------------------+
| Ethnic Group          | Not  or  |
+-----------------------+------------------------+
 
 
 Author
 
 
+--------------+------------------------------+
| Author       | [facility] |
+--------------+------------------------------+
| Organization | [facility] |
+--------------+------------------------------+
| Address      | Unknown                      |
+--------------+------------------------------+
| Phone        | Unavailable                  |
+--------------+------------------------------+
 
 
 
 Support
 
 
+-------------------+--------------+-------------------+-----------------+
| Name              | Relationship | Address           | Phone           |
+-------------------+--------------+-------------------+-----------------+
| Jackie Hilario | ECON         | [address]      | [phone] |
|                   |              | [place], [place]   |                 |
|                   |              | [address]             |                 |
+-------------------+--------------+-------------------+-----------------+
 
 
 
 Care Team Providers
 
 
 
+-----------------------+------+-------------+
| Care Team Member Name | Role | Phone       |
+-----------------------+------+-------------+
 PCP  | Unavailable |
+-----------------------+------+-------------+
 
 
 
 Encounter Details
 
 
+--------+----------+----------------------+-----------+-------------+
| Date   | Type     | Department           | Care Team | Description |
+--------+----------+----------------------+-----------+-------------+
| / | Results  |   Registration  [address] |           |             |
|    | Only     |  [address] |           |             |
|        |          |  [address]d  Mailcode: [id number] |           |             |
|        |          |   [place], [place]       |           |             |
|        |          | [address]           |           |             |
|        |          | [phone]         |           |             |
+--------+----------+----------------------+-----------+-------------+
 
 
 
 Social History
 
 
+----------------+-------+-----------+--------+------+
| Tobacco Use    | Types | Packs/Day | Years  | Date |
|                |       |           | Used   |      |
+----------------+-------+-----------+--------+------+
| Never Assessed |       |           |        |      |
+----------------+-------+-----------+--------+------+
 
 
 
+------------------+---------------+
| Sex Assigned at  | Date Recorded |
| Birth            |               |
+------------------+---------------+
| Not on file      |               |
+------------------+---------------+
 
 
 
+----------------+-------------+-------------+
| Job Start Date | Occupation  | Industry    |
+----------------+-------------+-------------+
| Not on file    | Not on file | Not on file |
+----------------+-------------+-------------+
 
 
 
+----------------+--------------+------------+
| Travel History | Travel Start | Travel End |
+----------------+--------------+------------+
 
 
 
 
+-------------------------------------+
| No recent travel history available. |
+-------------------------------------+
 documented as of this encounter
 
 Plan of Treatment
 Not on filedocumented as of this encounter
 
 Procedures
 
 
+----------------------+--------+-------------+----------------------+----------------------
+
| Procedure Name       | Priori | Date/Time   | Associated Diagnosis | Comments             
|
|                      | ty     |             |                      |                      
|
+----------------------+--------+-------------+----------------------+----------------------
+
| TRANSFUSION MEDICINE | Routin | 1998  |                      |   Results for this   
|
|  TESTS               | e      |  8:22 AM    |                      | procedure are in the 
|
|                      |        | PDT         |                      |  results section.    
|
+----------------------+--------+-------------+----------------------+----------------------
+
| CHEMISTRY TESTS 4    | Routin | 1998  |                      |   Results for this   
|
|                      | e      |  5:15 PM    |                      | procedure are in the 
|
|                      |        | PDT         |                      |  results section.    
|
+----------------------+--------+-------------+----------------------+----------------------
+
| CBC TESTS 2          | Routin | 1998  |                      |   Results for this   
|
|                      | e      |  5:15 PM    |                      | procedure are in the 
|
|                      |        | PDT         |                      |  results section.    
|
+----------------------+--------+-------------+----------------------+----------------------
+
| COAGULATION TESTS 2  | Routin | 1998  |                      |   Results for this   
|
|                      | e      |  5:15 PM    |                      | procedure are in the 
|
|                      |        | PDT         |                      |  results section.    
|
+----------------------+--------+-------------+----------------------+----------------------
+
 documented in this encounter
 
 Results
 TRANSFUSION MEDICINE TESTS (1998  8:22 AM PDT)
 
+-----------+----------+-----------+------------+--------------+
| Component | Value    | Ref Range | Performed  | Pathologist  |
|           |          |           | At         | Signature    |
 
+-----------+----------+-----------+------------+--------------+
| ABO GROUP | O        |           |            |              |
+-----------+----------+-----------+------------+--------------+
| RH TYPE   | POS      |           |            |              |
+-----------+----------+-----------+------------+--------------+
| ANTIBODY  | NEGATIVE |           |            |              |
| SCREEN    |          |           |            |              |
+-----------+----------+-----------+------------+--------------+
 
 
 
+----------+
| Specimen |
+----------+
|          |
+----------+
 
 
 
+----------------------+------------------------+----------------------+--------------+
| Performing           | Address                | City/State/Zipcode   | Phone Number |
| Organization         |                        |                      |              |
+----------------------+------------------------+----------------------+--------------+
|   [facility] |   [address]  |   [place] [address] |              |
|  PATHOLOGY           | [address]                |                      |              |
+----------------------+------------------------+----------------------+--------------+
 COAGULATION TESTS 2 (1998  5:15 PM PDT)
 
+-------------+--------------+-----------+------------+--------------+
| Component   | Value        | Ref Range | Performed  | Pathologist  |
|             |              |           | At         | Signature    |
+-------------+--------------+-----------+------------+--------------+
| PROTHROMBIN |        11.9  | SECONDS   |            |              |
|  TIME       |              |           |            |              |
+-------------+--------------+-----------+------------+--------------+
| PROTIME     |         1.   | SECONDS   |            |              |
| RATIO       |              |           |            |              |
+-------------+--------------+-----------+------------+--------------+
| PROTHROMBIN |         1.02 | INR       |            |              |
|  INR        |              |           |            |              |
+-------------+--------------+-----------+------------+--------------+
| APTT        |        28.6  | SECONDS   |            |              |
+-------------+--------------+-----------+------------+--------------+
 
 
 
+----------+
| Specimen |
+----------+
|          |
+----------+
 
 
 
+----------------------+------------------------+----------------------+--------------+
| Performing           | Address                | City/State/Zipcode   | Phone Number |
| Organization         |                        |                      |              |
+----------------------+------------------------+----------------------+--------------+
|   [facility] |   [address]  |   [place] [address] |              |
|  PATHOLOGY           | [address]                |                      |              |
 
+----------------------+------------------------+----------------------+--------------+
 CBC TESTS 2 (1998  5:15 PM PDT)
 
+-------------+-----------------+-----------+------------+--------------+
| Component   | Value           | Ref Range | Performed  | Pathologist  |
|             |                 |           | At         | Signature    |
+-------------+-----------------+-----------+------------+--------------+
| WHITE CELL  |         6.      | K/CU MM   |            |              |
| COUNT       |                 |           |            |              |
+-------------+-----------------+-----------+------------+--------------+
| RED CELL    |         4.9     | M/CU MM   |            |              |
| COUNT       |                 |           |            |              |
+-------------+-----------------+-----------+------------+--------------+
| HEMOGLOBIN  |        14.4     | GM/DL     |            |              |
+-------------+-----------------+-----------+------------+--------------+
| HEMATOCRIT  |        44.      | %         |            |              |
+-------------+-----------------+-----------+------------+--------------+
| MCV         |        89.8     | FL        |            |              |
+-------------+-----------------+-----------+------------+--------------+
| MCH         |        29.3     | PG        |            |              |
+-------------+-----------------+-----------+------------+--------------+
| MCHC        |        32.7 (L) | GM/DL     |            |              |
+-------------+-----------------+-----------+------------+--------------+
| RDW         |        13.5     | %         |            |              |
+-------------+-----------------+-----------+------------+--------------+
| PLATELET    |       238.      | K/CU MM   |            |              |
| COUNT       |                 |           |            |              |
+-------------+-----------------+-----------+------------+--------------+
| MPV         |         8.3     | FL        |            |              |
+-------------+-----------------+-----------+------------+--------------+
 
 
 
+----------+
| Specimen |
+----------+
|          |
+----------+
 
 
 
+----------------------+------------------------+----------------------+--------------+
| Performing           | Address                | City/State/Zipcode   | Phone Number |
| Organization         |                        |                      |              |
+----------------------+------------------------+----------------------+--------------+
|   [facility] |   [address]  |   [place] [address] |              |
|  PATHOLOGY           | [address]                |                      |              |
+----------------------+------------------------+----------------------+--------------+
 CHEMISTRY TESTS 4 (1998  5:15 PM PDT)
 
+-------------+-------------+-----------+------------+--------------+
| Component   | Value       | Ref Range | Performed  | Pathologist  |
|             |             |           | At         | Signature    |
+-------------+-------------+-----------+------------+--------------+
| SODIUM,     |       141.  | mmol/l    |            |              |
| PLASMA      |             |           |            |              |
| (LAB)       |             |           |            |              |
+-------------+-------------+-----------+------------+--------------+
| POTASSIUM,  |         3.5 | mmol/l    |            |              |
| PLASMA      |             |           |            |              |
 
| (LAB)       |             |           |            |              |
+-------------+-------------+-----------+------------+--------------+
| CHLORIDE,   |       108.  | mmol/l    |            |              |
| PLASMA      |             |           |            |              |
| (LAB)       |             |           |            |              |
+-------------+-------------+-----------+------------+--------------+
| TOTAL CO2,  |        26.  | mmol/l    |            |              |
| PLASMA      |             |           |            |              |
| (LAB)       |             |           |            |              |
+-------------+-------------+-----------+------------+--------------+
| BUN, PLASMA |        14.  | mg/dL     |            |              |
|  (LAB)      |             |           |            |              |
+-------------+-------------+-----------+------------+--------------+
| CREATININE  |         1.1 | mg/dL     |            |              |
| PLASMA      |             |           |            |              |
| (LAB)       |             |           |            |              |
+-------------+-------------+-----------+------------+--------------+
 
 
 
+----------+
| Specimen |
+----------+
|          |
+----------+
 
 
 
+----------------------+------------------------+----------------------+--------------+
| Performing           | Address                | City/State/Zipcode   | Phone Number |
| Organization         |                        |                      |              |
+----------------------+------------------------+----------------------+--------------+
|   [facility] |   [address]  |   [place] [address] |              |
|  PATHOLOGY           | [address]                |                      |              |
+----------------------+------------------------+----------------------+--------------+
 documented in this encounter
 
 Visit Diagnoses
 Not on filedocumented in this encounter

## 2020-06-03 NOTE — XMS
Encounter Summary
  Created on: 2020
 
 Isak Hilario
 External Reference #: [id number]
 : 30
 Sex: Male
 
 Demographics
 
 
+-----------------------+------------------------+
| Address               | [address]       |
|                       | [place]  [address]   |
+-----------------------+------------------------+
| Home Phone            | [phone]        |
+-----------------------+------------------------+
| Preferred Language    | Unknown                |
+-----------------------+------------------------+
| Marital Status        |                 |
+-----------------------+------------------------+
| Protestant Affiliation | NON                    |
+-----------------------+------------------------+
| Race                  | White                  |
+-----------------------+------------------------+
| Ethnic Group          | Not  or  |
+-----------------------+------------------------+
 
 
 Author
 
 
+--------------+------------------------------+
| Author       | [facility] |
+--------------+------------------------------+
| Organization | [facility] |
+--------------+------------------------------+
| Address      | Unknown                      |
+--------------+------------------------------+
| Phone        | Unavailable                  |
+--------------+------------------------------+
 
 
 
 Support
 
 
+-------------------+--------------+-------------------+-----------------+
| Name              | Relationship | Address           | Phone           |
+-------------------+--------------+-------------------+-----------------+
| Jackie Hilario | ECON         | [address]      | [phone] |
|                   |              | [place], [place]   |                 |
|                   |              | [address]             |                 |
+-------------------+--------------+-------------------+-----------------+
 
 
 
 Care Team Providers
 
 
 
+-----------------------+------+-------------+
| Care Team Member Name | Role | Phone       |
+-----------------------+------+-------------+
 PCP  | Unavailable |
+-----------------------+------+-------------+
 
 
 
 Encounter Details
 
 
+--------+----------+----------------------+-----------+-------------+
| Date   | Type     | Department           | Care Team | Description |
+--------+----------+----------------------+-----------+-------------+
| / | Results  |   Registration  [address] |           |             |
|    | Only     |  [address] |           |             |
|        |          |  R[address]  Mailcode: [id number] |           |             |
|        |          |   [place], [place]       |           |             |
|        |          | [address]           |           |             |
|        |          | [phone]         |           |             |
+--------+----------+----------------------+-----------+-------------+
 
 
 
 Social History
 
 
+----------------+-------+-----------+--------+------+
| Tobacco Use    | Types | Packs/Day | Years  | Date |
|                |       |           | Used   |      |
+----------------+-------+-----------+--------+------+
| Never Assessed |       |           |        |      |
+----------------+-------+-----------+--------+------+
 
 
 
+------------------+---------------+
| Sex Assigned at  | Date Recorded |
| Birth            |               |
+------------------+---------------+
| Not on file      |               |
+------------------+---------------+
 
 
 
+----------------+-------------+-------------+
| Job Start Date | Occupation  | Industry    |
+----------------+-------------+-------------+
| Not on file    | Not on file | Not on file |
+----------------+-------------+-------------+
 
 
 
+----------------+--------------+------------+
| Travel History | Travel Start | Travel End |
+----------------+--------------+------------+
 
 
 
 
+-------------------------------------+
| No recent travel history available. |
+-------------------------------------+
 documented as of this encounter
 
 Plan of Treatment
 Not on filedocumented as of this encounter
 
 Procedures
 
 
+----------------------+--------+-------------+----------------------+----------------------
+
| Procedure Name       | Priori | Date/Time   | Associated Diagnosis | Comments             
|
|                      | ty     |             |                      |                      
|
+----------------------+--------+-------------+----------------------+----------------------
+
| TRANSFUSION MEDICINE | Routin | 1998  |                      |   Results for this   
|
|  TESTS               | e      |  8:22 AM    |                      | procedure are in the 
|
|                      |        | PDT         |                      |  results section.    
|
+----------------------+--------+-------------+----------------------+----------------------
+
| CHEMISTRY TESTS 4    | Routin | 1998  |                      |   Results for this   
|
|                      | e      |  5:15 PM    |                      | procedure are in the 
|
|                      |        | PDT         |                      |  results section.    
|
+----------------------+--------+-------------+----------------------+----------------------
+
| CBC TESTS 2          | Routin | 1998  |                      |   Results for this   
|
|                      | e      |  5:15 PM    |                      | procedure are in the 
|
|                      |        | PDT         |                      |  results section.    
|
+----------------------+--------+-------------+----------------------+----------------------
+
| COAGULATION TESTS 2  | Routin | 1998  |                      |   Results for this   
|
|                      | e      |  5:15 PM    |                      | procedure are in the 
|
|                      |        | PDT         |                      |  results section.    
|
+----------------------+--------+-------------+----------------------+----------------------
+
 documented in this encounter
 
 Results
 TRANSFUSION MEDICINE TESTS (1998  8:22 AM PDT)
 
+-----------+----------+-----------+------------+--------------+
| Component | Value    | Ref Range | Performed  | Pathologist  |
|           |          |           | At         | Signature    |
 
+-----------+----------+-----------+------------+--------------+
| ABO GROUP | O        |           |            |              |
+-----------+----------+-----------+------------+--------------+
| RH TYPE   | POS      |           |            |              |
+-----------+----------+-----------+------------+--------------+
| ANTIBODY  | NEGATIVE |           |            |              |
| SCREEN    |          |           |            |              |
+-----------+----------+-----------+------------+--------------+
 
 
 
+----------+
| Specimen |
+----------+
|          |
+----------+
 
 
 
+----------------------+------------------------+----------------------+--------------+
| Performing           | Address                | City/State/Zipcode   | Phone Number |
| Organization         |                        |                      |              |
+----------------------+------------------------+----------------------+--------------+
|   [facility] |   [address]  |   [place] [address] |              |
|  PATHOLOGY           | [address]                |                      |              |
+----------------------+------------------------+----------------------+--------------+
 COAGULATION TESTS 2 (1998  5:15 PM PDT)
 
+-------------+--------------+-----------+------------+--------------+
| Component   | Value        | Ref Range | Performed  | Pathologist  |
|             |              |           | At         | Signature    |
+-------------+--------------+-----------+------------+--------------+
| PROTHROMBIN |        11.9  | SECONDS   |            |              |
|  TIME       |              |           |            |              |
+-------------+--------------+-----------+------------+--------------+
| PROTIME     |         1.   | SECONDS   |            |              |
| RATIO       |              |           |            |              |
+-------------+--------------+-----------+------------+--------------+
| PROTHROMBIN |         1.02 | INR       |            |              |
|  INR        |              |           |            |              |
+-------------+--------------+-----------+------------+--------------+
| APTT        |        28.6  | SECONDS   |            |              |
+-------------+--------------+-----------+------------+--------------+
 
 
 
+----------+
| Specimen |
+----------+
|          |
+----------+
 
 
 
+----------------------+------------------------+----------------------+--------------+
| Performing           | Address                | City/State/Zipcode   | Phone Number |
| Organization         |                        |                      |              |
+----------------------+------------------------+----------------------+--------------+
|   [facility] |   [address]  |   [place] [address] |              |
|  PATHOLOGY           | [address]                |                      |              |
 
+----------------------+------------------------+----------------------+--------------+
 CBC TESTS 2 (1998  5:15 PM PDT)
 
+-------------+-----------------+-----------+------------+--------------+
| Component   | Value           | Ref Range | Performed  | Pathologist  |
|             |                 |           | At         | Signature    |
+-------------+-----------------+-----------+------------+--------------+
| WHITE CELL  |         6.      | K/CU MM   |            |              |
| COUNT       |                 |           |            |              |
+-------------+-----------------+-----------+------------+--------------+
| RED CELL    |         4.9     | M/CU MM   |            |              |
| COUNT       |                 |           |            |              |
+-------------+-----------------+-----------+------------+--------------+
| HEMOGLOBIN  |        14.4     | GM/DL     |            |              |
+-------------+-----------------+-----------+------------+--------------+
| HEMATOCRIT  |        44.      | %         |            |              |
+-------------+-----------------+-----------+------------+--------------+
| MCV         |        89.8     | FL        |            |              |
+-------------+-----------------+-----------+------------+--------------+
| MCH         |        29.3     | PG        |            |              |
+-------------+-----------------+-----------+------------+--------------+
| MCHC        |        32.7 (L) | GM/DL     |            |              |
+-------------+-----------------+-----------+------------+--------------+
| RDW         |        13.5     | %         |            |              |
+-------------+-----------------+-----------+------------+--------------+
| PLATELET    |       238.      | K/CU MM   |            |              |
| COUNT       |                 |           |            |              |
+-------------+-----------------+-----------+------------+--------------+
| MPV         |         8.3     | FL        |            |              |
+-------------+-----------------+-----------+------------+--------------+
 
 
 
+----------+
| Specimen |
+----------+
|          |
+----------+
 
 
 
+----------------------+------------------------+----------------------+--------------+
| Performing           | Address                | City/State/Zipcode   | Phone Number |
| Organization         |                        |                      |              |
+----------------------+------------------------+----------------------+--------------+
|   [facility] |   [address]  |   [place] [address] |              |
|  PATHOLOGY           | [address]                |                      |              |
+----------------------+------------------------+----------------------+--------------+
 CHEMISTRY TESTS 4 (1998  5:15 PM PDT)
 
+-------------+-------------+-----------+------------+--------------+
| Component   | Value       | Ref Range | Performed  | Pathologist  |
|             |             |           | At         | Signature    |
+-------------+-------------+-----------+------------+--------------+
| SODIUM,     |       141.  | mmol/l    |            |              |
| PLASMA      |             |           |            |              |
| (LAB)       |             |           |            |              |
+-------------+-------------+-----------+------------+--------------+
| POTASSIUM,  |         3.5 | mmol/l    |            |              |
| PLASMA      |             |           |            |              |
 
| (LAB)       |             |           |            |              |
+-------------+-------------+-----------+------------+--------------+
| CHLORIDE,   |       108.  | mmol/l    |            |              |
| PLASMA      |             |           |            |              |
| (LAB)       |             |           |            |              |
+-------------+-------------+-----------+------------+--------------+
| TOTAL CO2,  |        26.  | mmol/l    |            |              |
| PLASMA      |             |           |            |              |
| (LAB)       |             |           |            |              |
+-------------+-------------+-----------+------------+--------------+
| BUN, PLASMA |        14.  | mg/dL     |            |              |
|  (LAB)      |             |           |            |              |
+-------------+-------------+-----------+------------+--------------+
| CREATININE  |         1.1 | mg/dL     |            |              |
| PLASMA      |             |           |            |              |
| (LAB)       |             |           |            |              |
+-------------+-------------+-----------+------------+--------------+
 
 
 
+----------+
| Specimen |
+----------+
|          |
+----------+
 
 
 
+----------------------+------------------------+----------------------+--------------+
| Performing           | Address                | City/State/Zipcode   | Phone Number |
| Organization         |                        |                      |              |
+----------------------+------------------------+----------------------+--------------+
|   [facility] |   [address]  |   [place] [address] |              |
|  PATHOLOGY           | [address]                |                      |              |
+----------------------+------------------------+----------------------+--------------+
 documented in this encounter
 
 Visit Diagnoses
 Not on filedocumented in this encounter

## 2020-06-03 NOTE — XMS
Encounter Summary
  Created on: 2020
 
 Isak Hilario
 External Reference #: [id number]
 : 30
 Sex: Male
 
 Demographics
 
 
+-----------------------+------------------------+
| Address               | [address]       |
|                       | [place]  [address]   |
+-----------------------+------------------------+
| Home Phone            | [phone]        |
+-----------------------+------------------------+
| Preferred Language    | Unknown                |
+-----------------------+------------------------+
| Marital Status        |                 |
+-----------------------+------------------------+
| Anabaptist Affiliation | NON                    |
+-----------------------+------------------------+
| Race                  | White                  |
+-----------------------+------------------------+
| Ethnic Group          | Not  or  |
+-----------------------+------------------------+
 
 
 Author
 
 
+--------------+------------------------------+
| Author       | [facility] |
+--------------+------------------------------+
| Organization | [facility] |
+--------------+------------------------------+
| Address      | Unknown                      |
+--------------+------------------------------+
| Phone        | Unavailable                  |
+--------------+------------------------------+
 
 
 
 Support
 
 
+-------------------+--------------+-------------------+-----------------+
| Name              | Relationship | Address           | Phone           |
+-------------------+--------------+-------------------+-----------------+
| Jackie Hilario | ECON         | [address]      | [phone] |
|                   |              | [place], [place]   |                 |
|                   |              | [address]             |                 |
+-------------------+--------------+-------------------+-----------------+
 
 
 
 Care Team Providers
 
 
 
+-----------------------+------+-------------+
| Care Team Member Name | Role | Phone       |
+-----------------------+------+-------------+
 PCP  | Unavailable |
+-----------------------+------+-------------+
 
 
 
 Encounter Details
 
 
+--------+-------------+---------------------+---------------------+---------------+
| Date   | Type        | Department          | Care Team           | Description   |
+--------+-------------+---------------------+---------------------+---------------+
| / | Office      |   General Internal  |   Note, Outpatient  | Progress Note |
|    | Visit-Trans | Medicine  [address]   | Clinic              |               |
|        | [address]      | [address]       |                     |               |
|        |             | Mailcode: [address]475      |                     |               |
|        |             | Outpatient Clinic   |                     |               |
|        |             | [facility] [address]      |                     |               |
|        |             | [place], [place]        |                     |               |
|        |             | [address]          |                     |               |
|        |             | [phone]        |                     |               |
+--------+-------------+---------------------+---------------------+---------------+
 
 
 
 Social History
 
 
+----------------+-------+-----------+--------+------+
| Tobacco Use    | Types | Packs/Day | Years  | Date |
|                |       |           | Used   |      |
+----------------+-------+-----------+--------+------+
| Never Assessed |       |           |        |      |
+----------------+-------+-----------+--------+------+
 
 
 
+------------------+---------------+
| Sex Assigned at  | Date Recorded |
| Birth            |               |
+------------------+---------------+
| Not on file      |               |
+------------------+---------------+
 
 
 
+----------------+-------------+-------------+
| Job Start Date | Occupation  | Industry    |
+----------------+-------------+-------------+
| Not on file    | Not on file | Not on file |
+----------------+-------------+-------------+
 
 
 
+----------------+--------------+------------+
| Travel History | Travel Start | Travel End |
 
+----------------+--------------+------------+
 
 
 
+-------------------------------------+
| No recent travel history available. |
+-------------------------------------+
 documented as of this encounter
 
 Progress Notes
 Interface, Transcription In - 2007  3:12 AM PST CLINIC DATE: 98
  
  UROLOGIC ONCOLOGY CLINIC
  
  The patient returns to undergo needle biopsy of the seminal vesicles after
  being seen previously this morning in consultation for high grade extensive
  adenocarcinoma of the prostate. After urinalysis showed no evidence of
  urinary tract infection, and the patient received an oral antibiotic
  consisting of 750 mg of Cipro, a transrectal ultrasound was performed. This
  demonstrated significant enlargement of the seminal vesicles but no evidence
  of tumor extension into the seminal vesicles or extension beyond the margins
  of the prostate. Biopsies were taken from the right seminal vesicle
  proximally in the area of its junction with the prostate and distally, with
  similar biopsies obtained from the left seminal vesicle. The patient
  tolerated the procedure well and was instructed to return for significant
  bleeding involving clots from the bladder or rectum, fever over 101 degrees,
  or any other significant problems. He was informed that full activity could
  be resumed within 24 hours and was to take another ciprofloxacin 750 mg
  tablet this evening. He will be informed of the results when they are
  available.
  
  
  
  [facility]
  Chief, Urologic Oncology
  , Surgery
  
  /[facility]
  D: 98
  T: 98
   Electronically signed by Interface, Transcription In at 2007  3:12 AM PSTdocumented
 in this encounter
 
 Plan of Treatment
 Not on filedocumented as of this encounter
 
 Visit Diagnoses
 Not on filedocumented in this encounter

## 2020-06-03 NOTE — XMS
Encounter Summary
  Created on: 2020
 
 Isak Hilario
 External Reference #: [id number]
 : 30
 Sex: Male
 
 Demographics
 
 
+-----------------------+----------------------+
| Address               | [address]         |
|                       | [place]  [address] |
+-----------------------+----------------------+
| Home Phone            | [phone]      |
+-----------------------+----------------------+
| Preferred Language    | Unknown              |
+-----------------------+----------------------+
| Marital Status        |               |
+-----------------------+----------------------+
| Baptism Affiliation | Unknown              |
+-----------------------+----------------------+
| Race                  | Unknown              |
+-----------------------+----------------------+
| Ethnic Group          | Unknown              |
+-----------------------+----------------------+
 
 
 Author
 
 
+--------------+--------------------------------------------+
| Author       | [facility]  |
|              | [facility] [place]                                |
+--------------+--------------------------------------------+
| Organization | [facility]  |
|              | [facility] [place]                                |
+--------------+--------------------------------------------+
| Address      | Unknown                                    |
+--------------+--------------------------------------------+
| Phone        | Unavailable                                |
+--------------+--------------------------------------------+
 
 
 
 Support
 
 
+-------------+--------------+---------+-----------------+
| Name        | Relationship | Address | Phone           |
+-------------+--------------+---------+-----------------+
| Alessandra Saxena | ECON         | Unknown | [phone] |
+-------------+--------------+---------+-----------------+
 
 
 
 Care Team Providers
 
 
 
+-----------------------+------+-----------------+
| Care Team Member Name | Role | Phone           |
+-----------------------+------+-----------------+
| Bari Ha MD | PCP  | [phone] |
+-----------------------+------+-----------------+
 
 
 
 Reason for Visit
 
 
+--------+----------+
| Reason | Comments |
+--------+----------+
| Apnea  |          |
+--------+----------+
 
 
 
 Encounter Details
 
 
+--------+---------+----------------------+----------------------+----------------------+
| Date   | Type    | Department           | Care Team            | Description          |
+--------+---------+----------------------+----------------------+----------------------+
| / | Office  |   [facility]      |   Kyree Smith PA  | MISTI on CPAP (Primary |
| 2014   | Visit   | SLEEP DISORDER  [address]  |  [address]     |  Dx)                 |
|        |         | [address]      | [place]      |                      |
|        |         | [place] [address] | [address]  [phone]  |                      |
|        |         |   [phone]       |  [phone] (Fax)  |                      |
+--------+---------+----------------------+----------------------+----------------------+
 
 
 
 Social History
 
 
+--------------+-------+-----------+--------+------+
| Tobacco Use  | Types | Packs/Day | Years  | Date |
|              |       |           | Used   |      |
+--------------+-------+-----------+--------+------+
| Never Smoker |       |           |        |      |
+--------------+-------+-----------+--------+------+
 
 
 
+-------------+-------------+---------+----------+
| Alcohol Use | Drinks/Week | oz/Week | Comments |
+-------------+-------------+---------+----------+
| Not Asked   |             |         |          |
+-------------+-------------+---------+----------+
 
 
 
+------------------+---------------+
| Sex Assigned at  | Date Recorded |
| Birth            |               |
+------------------+---------------+
 
| Not on file      |               |
+------------------+---------------+
 
 
 
+----------------+-------------+-------------+
| Job Start Date | Occupation  | Industry    |
+----------------+-------------+-------------+
| Not on file    | Not on file | Not on file |
+----------------+-------------+-------------+
 
 
 
+----------------+--------------+------------+
| Travel History | Travel Start | Travel End |
+----------------+--------------+------------+
 
 
 
+-------------------------------------+
| No recent travel history available. |
+-------------------------------------+
 documented as of this encounter
 
 Last Filed Vital Signs
 
 
+-------------------+----------------------+----------------------+----------+
| Vital Sign        | Reading              | Time Taken           | Comments |
+-------------------+----------------------+----------------------+----------+
| Blood Pressure    | 122/66               | 2014 10:47 AM  |          |
|                   |                      | PDT                  |          |
+-------------------+----------------------+----------------------+----------+
| Pulse             | 110                  | 2014 10:47 AM  |          |
|                   |                      | PDT                  |          |
+-------------------+----------------------+----------------------+----------+
| Temperature       | -                    | -                    |          |
+-------------------+----------------------+----------------------+----------+
| Respiratory Rate  | 16                   | 2014 10:47 AM  |          |
|                   |                      | PDT                  |          |
+-------------------+----------------------+----------------------+----------+
| Oxygen Saturation | 96%                  | 2014 10:47 AM  |          |
|                   |                      | PDT                  |          |
+-------------------+----------------------+----------------------+----------+
| Inhaled Oxygen    | -                    | -                    |          |
| Concentration     |                      |                      |          |
+-------------------+----------------------+----------------------+----------+
| Weight            | 99.8 kg (220 lb 1.6  | 2014 10:47 AM  |          |
|                   | oz)                  | PDT                  |          |
+-------------------+----------------------+----------------------+----------+
| Height            | -                    | -                    |          |
+-------------------+----------------------+----------------------+----------+
| Body Mass Index   | 34.47                | 2013 11:18 AM  |          |
|                   |                      | PST                  |          |
+-------------------+----------------------+----------------------+----------+
 documented in this encounter
 
 Progress Notes
 Kyree Smith PA - 2014 10:54 AM PDTFormatting of this note might be different from 
the original.
 
 Subjective: 
  
 Patient ID: Isak Hilario is a 83 y.o. male.
 
 HPI
 
 last office visit was:  10/29/2010
 date of polysomnography: 1999 
 AHI: 
 RDI: 99.7
 O2%: 87% 
 Machine type: [employer] with nasal mask
 obtained from:  [facility] [place] 
 pressure: 12 cm
 Nights using CPAP: 254/254
 % of nights >4 hours:  99%
 average usage (all nights): 7.6
 average usage (nights used): 7.6
 AHI: n/a
 
 Isak comes in for CPAP compliance.  He continues to have excellent compliance, wearing his
 CPAP on a nightly basis for the duration of his sleep.  He cannot sleep without it.  His cu
rrent CPAP appears to be nearly nine years old.  He would likely benefit from using a variab
le pressure machine.  His pressure has not been checked since .  He feels that his apnea
 is we controlled and his machine is still working well.  He will consider changing it when 
necessary.  We discussed when he is able to replace his equipment.  I gave him a handout lizzy
t shows when his insurance will accept him replacing each item.  We also discussed the recom
mended cleaning schedule for his equipment.   He cleans his equipment regularly and replaces
 his mask pretty consistently, but has not replaced his heated tube in several years.
 
 I have discussed the download and results of the paperwork in detail.  He is unchanged or i
mproved in nearly all categories, with no areas of concern.  The download shows that his cornelius
ks are well controlled. 
 
  
 
 Review of Systems
 
 Objective: 
 Physical Exam
 
 Assessment: 
 
 Problem #1: OBSTRUCTIVE SLEEP APNEA (327.23)
 This is controlled with CPAP.  His CPAP compliance is going well.  His machine is nearly ni
ne years old and could be replaced, but he feels that it is still working well.
 
 Plan: 
 
 He is to continue with CPAP indefinitely.  I have recommended that he replace his CPAP when
 he notices that it is not working properly.  I have also recommended that he touch base wit
h his medical supplier twice per year to ensure that his equipment is satisfactory. 
 
 I will follow up again in 2 years, sooner prn.  At that time we will reassess with all appr
opiate paperwork.  Thirty minutes were spent face-to-face, with the majority of time spent i
n counseling.
 
 Kyree Smith PA-C
 
 cc: 
 
 Bari Ha MD 
 
 Electronically signed by KWABENA Cleveland at 2014 12:47 PM Magaly Cai, Master 
of Arts - 2014 10:32 AM PDTFormatting of this note might be different from the origina
l.
  14 1000 
 Agrawal Depression Inventory-II 
 Depression Score 8 - Minimal depression 
 Insomnia Severity Index 
 Insomnia Severity Index 2  
 Papillion Sleepiness Scale 
 Sitting and reading 1 
 Watching TV 1 
 Sitting, inactive in a public place (e.g. a theatre or a meeting) 0 
 As a passenger in a car for an hour without a break 0 
 Lying down to rest in the afternoon when circumstances permit 1 
 Sitting and talking to someone 0 
 Sitting quietly after a lunch without alcohol 1 
 In a car, while stopped for a few minutes in traffic 0 
 Total score 4  
 SF-36v2  Score 
 PF 42.3  
 RP 39.71  
 BP 46.06  
 GH 50.55  
 VT 45.85  
 SF 56.85  
 RE 44.22  
 MH 55.64  
 PCS 41.7  
 MCS 54.51  
  Electronically signed by KWABENA Cleveland at 2014 12:47 PM PDTdocumented in this enc
ounter
 
 Plan of Treatment
 Not on filedocumented as of this encounter
 
 Visit Diagnoses
 
 
+----------------------------------------------------------------------+
| Diagnosis                                                            |
+----------------------------------------------------------------------+
|   MISTI on CPAP - Primary  Obstructive sleep apnea (adult) (pediatric) |
+----------------------------------------------------------------------+
 documented in this encounter

## 2020-06-03 NOTE — XMS
Clinical Summary
  Created on: 2020
 
 Isak Hilario
 External Reference #: [id number]
 : 30
 Sex: Male
 
 Demographics
 
 
+-----------------------+----------------------+
| Address               | [address]         |
|                       | [place]  [address] |
+-----------------------+----------------------+
| Home Phone            | [phone]      |
+-----------------------+----------------------+
| Preferred Language    | Unknown              |
+-----------------------+----------------------+
| Marital Status        |               |
+-----------------------+----------------------+
| Mu-ism Affiliation | Unknown              |
+-----------------------+----------------------+
| Race                  | Unknown              |
+-----------------------+----------------------+
| Ethnic Group          | Unknown              |
+-----------------------+----------------------+
 
 
 Author
 
 
+--------------+--------------------------------------------+
| Author       | [facility]  |
|              | and [place]                                |
+--------------+--------------------------------------------+
| Organization | [facility]  |
|              | and [place]                                |
+--------------+--------------------------------------------+
| Address      | Unknown                                    |
+--------------+--------------------------------------------+
| Phone        | Unavailable                                |
+--------------+--------------------------------------------+
 
 
 
 Support
 
 
+-------------+--------------+---------+-----------------+
| Name        | Relationship | Address | Phone           |
+-------------+--------------+---------+-----------------+
| Alessandra Saxena | ECON         | Unknown | [phone] |
+-------------+--------------+---------+-----------------+
 
 
 
 Care Team Providers
 
 
 
+-----------------------+------+-----------------+
| Care Team Member Name | Role | Phone           |
+-----------------------+------+-----------------+
| Bari Ha MD | PCP  | [phone] |
+-----------------------+------+-----------------+
 
 
 
 Allergies
 No Known Allergies
 
 Medications
 
 
+----------------------+----------------------+-----------+---------+------+------+-------+
| Medication           | Sig                  | Dispensed | Refills | Star | End  | Statu |
|                      |                      |           |         | t    | Date | s     |
|                      |                      |           |         | Date |      |       |
+----------------------+----------------------+-----------+---------+------+------+-------+
|   cholecalciferol    | Take 400 Units by    |           | 0       | 09/1 |      | Activ |
| (VITAMIN D-3) 400    | mouth Daily.         |           |         | 4/20 |      | e     |
| UNITS TABS           |                      |           |         | 12   |      |       |
+----------------------+----------------------+-----------+---------+------+------+-------+
|   dilTIAZem          | Take 360 mg by mouth |           | 0       |      |      | Activ |
| (CARDIZEM CD) 360 MG |  Daily.              |           |         |      |      | e     |
|  24 hr capsule       |                      |           |         |      |      |       |
+----------------------+----------------------+-----------+---------+------+------+-------+
|   levothyroxine      | Take 75 mcg by mouth |           | 0       |      |      | Activ |
| (SYNTHROID) 75 mcg   |  Daily.              |           |         |      |      | e     |
| tablet               |                      |           |         |      |      |       |
+----------------------+----------------------+-----------+---------+------+------+-------+
|   spironolactone     | Take 25 mg by mouth  |           | 0       |      |      | Activ |
| (ALDACTONE) 25 mg    | Daily.               |           |         |      |      | e     |
| tablet               |                      |           |         |      |      |       |
+----------------------+----------------------+-----------+---------+------+------+-------+
|   warfarin           | Take 1 mg by mouth   |           | 0       |      |      | Activ |
| (COUMADIN) 1 mg      | Daily.               |           |         |      |      | e     |
| tablet               |                      |           |         |      |      |       |
+----------------------+----------------------+-----------+---------+------+------+-------+
|   cyanocobalamin     | Take 500 mcg by      |           | 0       |      |      | Activ |
| (VITAMIN B-12) 500   | mouth Daily.         |           |         |      |      | e     |
| mcg tablet           |                      |           |         |      |      |       |
+----------------------+----------------------+-----------+---------+------+------+-------+
 
 
 
 Active Problems
 
 
+-------------------------------------+------------+
| Problem                             | Noted Date |
+-------------------------------------+------------+
| Basal cell carcinoma (BCC) of scalp | 2020 |
+-------------------------------------+------------+
 
 
 
+-------------------------------------------------------------+
 
|   Overview:   Added automatically from request for surgery  |
| [id number]                                                     |
+-------------------------------------------------------------+
 
 
 
+-------------------------------------------+------------+
| CARPAL TUNNEL SYNDROME                    | 2012 |
+-------------------------------------------+------------+
| PARESTHESIA                               | 2012 |
+-------------------------------------------+------------+
| DEGENERATIVE DISC DISEASE, CERVICAL SPINE | 2012 |
+-------------------------------------------+------------+
| DIABETES MELLITUS                         | 2012 |
+-------------------------------------------+------------+
| DIABETIC PERIPHERAL NEUROPATHY            | 2012 |
+-------------------------------------------+------------+
| OBSTRUCTIVE SLEEP APNEA                   | 10/29/2010 |
+-------------------------------------------+------------+
 
 
 
 Encounters
 
 
+--------+-------------+-----------------+---------------------+----------------------+
| Date   | Type        | Specialty       | Care Team           | Description          |
+--------+-------------+-----------------+---------------------+----------------------+
| / | Off-Site    | Anticoagulation |   Jason Braga,  | Atrial fibrillation, |
|    | Visit       |                 | ARNP                |  unspecified type    |
|        |             |                 |                     | (HCC) (Primary Dx);  |
|        |             |                 |                     | Monitoring for       |
|        |             |                 |                     | long-term            |
|        |             |                 |                     | anticoagulant use;   |
|        |             |                 |                     | Non-healing surgical |
|        |             |                 |                     |  wound, initial      |
|        |             |                 |                     | encounter            |
+--------+-------------+-----------------+---------------------+----------------------+
| / | Lab         | Lab             |   [facility]    | Acute on chronic     |
|    | Requisition |                 | MD [facility]          | diastolic            |
|        |             |                 |                     | (congestive) heart   |
|        |             |                 |                     | failure (HCC);       |
|        |             |                 |                     | Metabolic            |
|        |             |                 |                     | encephalopathy;      |
|        |             |                 |                     | Essential (primary)  |
|        |             |                 |                     | hypertension         |
+--------+-------------+-----------------+---------------------+----------------------+
 from Last 3 Months
 
 Immunizations
 
 
+----------------------+------------------------------------+----------+
| Name                 | Administration Dates               | Next Due |
+----------------------+------------------------------------+----------+
| HEP A/HEP B, 3 DOSE  | 2006, 2005, 2005 |          |
| (ADULT)              |                                    |          |
+----------------------+------------------------------------+----------+
| INFLUENZA PF         | 2017, 10/18/2010             |          |
| TRIVALENT(PED/ADOL/A |                                    |          |
 
| DULT), PSKT          |                                    |          |
+----------------------+------------------------------------+----------+
| INFLUENZA TRIV       | 2011                         |          |
| W/PRES(PED/ADOL/ADUL |                                    |          |
| T),MULTIDOSE         |                                    |          |
+----------------------+------------------------------------+----------+
| INFLUENZA, [id number],  | 2010                         |          |
| UNSPECIFIED          |                                    |          |
+----------------------+------------------------------------+----------+
| INFLUENZA,           | 2007                         |          |
| UNSPECIFIED          |                                    |          |
| FORMULATION          |                                    |          |
+----------------------+------------------------------------+----------+
| PNEUMOCOCCAL         | 2019                         |          |
| CONJUGATE 13-VALENT  |                                    |          |
| (PCV13)              |                                    |          |
+----------------------+------------------------------------+----------+
| TDAP, (ADOL/ADULT)   | 2012                         |          |
+----------------------+------------------------------------+----------+
| ZOSTER, 1 DOSE       | 2009                         |          |
| (ZOSTAVAX)           |                                    |          |
+----------------------+------------------------------------+----------+
 
 
 
 Social History
 
 
+--------------+-------+-----------+--------+------+
| Tobacco Use  | Types | Packs/Day | Years  | Date |
|              |       |           | Used   |      |
+--------------+-------+-----------+--------+------+
| Never Smoker |       |           |        |      |
+--------------+-------+-----------+--------+------+
 
 
 
+---------------------+------+---+----------+
| Smokeless Tobacco:  | Chew |   | Quit:    |
| Former User         |      |   | 19 |
|                     |      |   | 85       |
+---------------------+------+---+----------+
 
 
 
+-------------+-------------+---------+----------+
| Alcohol Use | Drinks/Week | oz/Week | Comments |
+-------------+-------------+---------+----------+
| Not Asked   |             |         |          |
+-------------+-------------+---------+----------+
 
 
 
+------------------+---------------+
| Sex Assigned at  | Date Recorded |
| Birth            |               |
+------------------+---------------+
| Not on file      |               |
+------------------+---------------+
 
 
 
 
+----------------+-------------+-------------+
| Job Start Date | Occupation  | Industry    |
+----------------+-------------+-------------+
| Not on file    | Not on file | Not on file |
+----------------+-------------+-------------+
 
 
 
+----------------+--------------+------------+
| Travel History | Travel Start | Travel End |
+----------------+--------------+------------+
 
 
 
+-------------------------------------+
| No recent travel history available. |
+-------------------------------------+
 
 
 
 Last Filed Vital Signs
 
 
+-------------------+----------------------+----------------------+----------+
| Vital Sign        | Reading              | Time Taken           | Comments |
+-------------------+----------------------+----------------------+----------+
| Blood Pressure    | 127/67               | 2020  7:36 AM  |          |
|                   |                      | PDT                  |          |
+-------------------+----------------------+----------------------+----------+
| Pulse             | 64                   | 2020  7:36 AM  |          |
|                   |                      | PDT                  |          |
+-------------------+----------------------+----------------------+----------+
| Temperature       | 36.2   C (97.2   F)  | 2020  7:36 AM  |          |
|                   |                      | PDT                  |          |
+-------------------+----------------------+----------------------+----------+
| Respiratory Rate  | 20                   | 2020  7:36 AM  |          |
|                   |                      | PDT                  |          |
+-------------------+----------------------+----------------------+----------+
| Oxygen Saturation | 96%                  | 2020  7:36 AM  |          |
|                   |                      | PDT                  |          |
+-------------------+----------------------+----------------------+----------+
| Inhaled Oxygen    | -                    | -                    |          |
| Concentration     |                      |                      |          |
+-------------------+----------------------+----------------------+----------+
| Weight            | 91.9 kg (202 lb 9.6  | 2020  1:05 PM  |          |
|                   | oz)                  | PST                  |          |
+-------------------+----------------------+----------------------+----------+
| Height            | 170.2 cm (5' 7")     | 2020  1:05 PM  |          |
|                   |                      | PST                  |          |
+-------------------+----------------------+----------------------+----------+
| Body Mass Index   | 31.73                | 2020  1:05 PM  |          |
|                   |                      | PST                  |          |
+-------------------+----------------------+----------------------+----------+
 
 
 
 Plan of Treatment
 
 
 
+----------------------+-----------+---------------------------------+----------+
| Health Maintenance   | Due Date  | Last Done                       | Comments |
+----------------------+-----------+---------------------------------+----------+
| Diabetic Eye Exam    |  |                                 |          |
|                      | 8         |                                 |          |
+----------------------+-----------+---------------------------------+----------+
| Diabetic Foot Exam   |  |                                 |          |
|                      | 8         |                                 |          |
+----------------------+-----------+---------------------------------+----------+
| Hemoglobin A1c       |  |                                 |          |
| Screening            | 8         |                                 |          |
+----------------------+-----------+---------------------------------+----------+
| Vaccine: Zoster (2   |  | 2009                      |          |
| of 3)                | 0         |                                 |          |
+----------------------+-----------+---------------------------------+----------+
| Adult Annual         |  |                                 |          |
| Wellness Visit       | 0         |                                 |          |
+----------------------+-----------+---------------------------------+----------+
| Microalbumin         |  |                                 |          |
| Screening            | 0         |                                 |          |
+----------------------+-----------+---------------------------------+----------+
| Vaccine:             |  | 2019                      |          |
| Pneumococcal 65+ (2  | 0         |                                 |          |
| of 2 - PPSV23)       |           |                                 |          |
+----------------------+-----------+---------------------------------+----------+
| Vaccine:             |  | 2012                      |          |
| Dtap/Tdap/Td (2 -    | 2         |                                 |          |
| Td)                  |           |                                 |          |
+----------------------+-----------+---------------------------------+----------+
| Vaccine: Influenza   | Completed | 2020, 2017,         |          |
|                      |           | 2011, Additional history  |          |
|                      |           | exists                          |          |
+----------------------+-----------+---------------------------------+----------+
 
 
 
 Procedures
 
 
+------------------+--------+-------------+----------------------+----------------------+
| Procedure Name   | Priori | Date/Time   | Associated Diagnosis | Comments             |
|                  | ty     |             |                      |                      |
+------------------+--------+-------------+----------------------+----------------------+
| BASIC METABOLIC  | Routin | 2020  |   Acute on chronic   |   Results for this   |
| PANEL            | e      |  1:30 PM    | diastolic            | procedure are in the |
|                  |        | PDT         | (congestive) heart   |  results section.    |
|                  |        |             | failure (HCC)        |                      |
|                  |        |             | Metabolic            |                      |
|                  |        |             | encephalopathy       |                      |
|                  |        |             | Essential (primary)  |                      |
|                  |        |             | hypertension         |                      |
+------------------+--------+-------------+----------------------+----------------------+
| POC PT/INR       | Routin | 2020  |   Atrial             |   Results for this   |
| FINGERSTICK      | e      |  6:50 AM    | fibrillation,        | procedure are in the |
|                  |        | PDT         | unspecified type     |  results section.    |
|                  |        |             | (HCC)  Monitoring    |                      |
|                  |        |             | for long-term        |                      |
|                  |        |             | anticoagulant use    |                      |
+------------------+--------+-------------+----------------------+----------------------+
 
 from Last 3 Months
 
 Results
 Basic Metabolic Panel (2020  1:30 PM PDT)
 
+-------------+--------------------------+-----------------+-------------+--------------+
| Component   | Value                    | Ref Range       | Performed   | Pathologist  |
|             |                          |                 | At          | Signature    |
+-------------+--------------------------+-----------------+-------------+--------------+
| Na          | 137                      | 136 - 145       | [facility]  |              |
|             |                          | mmol/L          | [facility]    |              |
|             |                          |                 | [facility]     |              |
|             |                          |                 | [facility]    |              |
|             |                          |                 | LABORATORY  |              |
+-------------+--------------------------+-----------------+-------------+--------------+
| K           | 3.1 (L)                  | 3.4 - 5.1       | [facility]  |              |
|             |                          | mmol/L          | [facility]    |              |
|             |                          |                 | [facility]     |              |
|             |                          |                 | [facility]    |              |
|             |                          |                 | LABORATORY  |              |
+-------------+--------------------------+-----------------+-------------+--------------+
| Cl          | 94 (L)                   | 98 - 107 mmol/L | [facility]  |              |
|             |                          |                 | [facility]    |              |
|             |                          |                 | [facility]     |              |
|             |                          |                 | [facility]    |              |
|             |                          |                 | LABORATORY  |              |
+-------------+--------------------------+-----------------+-------------+--------------+
| CO2         | 34 (H)                   | 20 - 31 mmol/L  | [facility]  |              |
|             |                          |                 | [facility]    |              |
|             |                          |                 | [facility]     |              |
|             |                          |                 | [facility] -    |              |
|             |                          |                 | LABORATORY  |              |
+-------------+--------------------------+-----------------+-------------+--------------+
| Anion Gap   | 9                        | 3 - 16 mmol/L   | [facility]  |              |
|             |                          |                 | [facility]    |              |
|             |                          |                 | [facility]     |              |
|             |                          |                 | [facility]    |              |
|             |                          |                 | LABORATORY  |              |
+-------------+--------------------------+-----------------+-------------+--------------+
| Glucose     | 159 (H)                  | 60 - 106 mg/dL  | [facility]  |              |
|             |                          |                 | [facility]    |              |
|             |                          |                 | [facility]     |              |
|             |                          |                 | [facility]    |              |
|             |                          |                 | [facility]ORY  |              |
+-------------+--------------------------+-----------------+-------------+--------------+
| BUN         | 37 (H)                   | 9 - 23 mg/dL    | [facility]  |              |
|             |                          |                 | [facility]    |              |
|             |                          |                 | [facility]     |              |
|             |                          |                 | [facility]    |              |
|             |                          |                 | LABORATORY  |              |
+-------------+--------------------------+-----------------+-------------+--------------+
| Creatinine  | 1.30                     | 0.70 - 1.30     | [facility]  |              |
|             |                          | mg/dL           |  [facility]    |              |
|             |                          |                 | [facility]     |              |
|             |                          |                 | [facility] -    |              |
|             |                          |                 | LAB[facility]ATORY  |              |
+-------------+--------------------------+-----------------+-------------+--------------+
| eGFR if not | 52 (L)Comment:           | >=60            | [facility]  |              |
|      | GLOMERULAR FILTRATION    | mL/min/1.73m2   | [facility]    |              |
| [employer]    | RATE,ESTIMATED           |                 | MEDICAL     |              |
 
|             |   mL/min/1.31y7Tvxp than |                 | CENTER -    |              |
|             |  60    Chronic kidney    |                 | LABORATORY  |              |
|             | disease,if found over a  |                 |             |              |
|             | 3-month period.Less than |                 |             |              |
|             |  15    Kidney failureFor |                 |             |              |
|             |                   |                 |             |              |
|             | Americans,multiply the   |                 |             |              |
|             | calculated GFR by 1.21.  |                 |             |              |
|             |                          |                 |             |              |
+-------------+--------------------------+-----------------+-------------+--------------+
| Calcium     | 9.6                      | 8.7 - 10.4      | [facility]  |              |
|             |                          | mg/dL           |  [facility]    |              |
|             |                          |                 | [facility]     |              |
|             |                          |                 | [facility] -    |              |
|             |                          |                 | LABORATORY  |              |
+-------------+--------------------------+-----------------+-------------+--------------+
| BUN/Creatin | 28.5                     |                 | [facility]  |              |
| ine Ratio   |                          |                 | [facility]    |              |
|             |                          |                 | [facility]     |              |
|             |                          |                 | [facility] -    |              |
|             |                          |                 | LABORATORY  |              |
+-------------+--------------------------+-----------------+-------------+--------------+
 
 
 
+----------+
| Specimen |
+----------+
| Blood    |
+----------+
 
 
 
+----------------------+--------------------+--------------------+----------------+
| Performing           | Address            | City/State/Zipcode | Phone Number   |
| Organization         |                    |                    |                |
+----------------------+--------------------+--------------------+----------------+
|   [facility]     |   [address] |   [place]  |   [phone] |
| [facility]  |                    | [address]              |                |
| - LABORATORY         |                    |                    |                |
+----------------------+--------------------+--------------------+----------------+
 POCT PT/INR fingerstick (2020  6:50 AM PDT)
 
+-----------+---------+-----------+------------+--------------+
| Component | Value   | Ref Range | Performed  | Pathologist  |
|           |         |           | At         | Signature    |
+-----------+---------+-----------+------------+--------------+
| INR, POC  | 3.2 (A) | 0.9 - 1.2 |            |              |
+-----------+---------+-----------+------------+--------------+
 
 
 
+----------+
| Specimen |
+----------+
| Blood    |
+----------+
 from Last 3 Months
 
 Insurance
 
 
 
+----------+--------+-------------+--------+-------------+------------+--------+
| Payer    | Benefi | Subscriber  | Effect | Phone       | Address    | Type   |
|          | t Plan | ID          | meena    |             |            |        |
|          |  /     |             | Dates  |             |            |        |
|          | Group  |             |        |             |            |        |
+----------+--------+-------------+--------+-------------+------------+--------+
| MEDICARE | MEDICA | [id number] | 20 | [phone] |            | Medica |
|          | RE     |             | 07-Pre | 5           |            | re     |
|          | PART A |             | sent   |             |            |        |
|          |  AND B |             |        |             |            |        |
+----------+--------+-------------+--------+-------------+------------+--------+
| MODA     | MODA   | [id number]   | 3/1/20 | [phone] |   [address]   | Indemn |
|          | HEALTH |             | 08-Pre | 9           | [address]      | ity    |
|          |  MDCR  |             | sent   |             | [place],  |        |
|          | [employer]PPL  |             |        |             | [place] [address]   |        |
+----------+--------+-------------+--------+-------------+------------+--------+
 
 
 
+----------------+--------+-------------+--------+-------------+---------------------+
| Guarantor Name | Accoun | Relation to | Date   | Phone       | Billing Address     |
|                | t Type |  Patient    | of     |             |                     |
|                |        |             | Birth  |             |                     |
+----------------+--------+-------------+--------+-------------+---------------------+
| ClarenceIsak rey  | Person | Self        | / |             |   [address]      |
|                | al/Fam |             | 1930   | [phone] | [place] [address] |
|                | jacek    |             |        | 6 (Home)    |                     |
+----------------+--------+-------------+--------+-------------+---------------------+
 
 
 
 Advance Directives
 
 
+-----------+---------------+----------------+-------------+
| Type      | Date Recorded | Patient        | Explanation |
|           |               | Representative |             |
+-----------+---------------+----------------+-------------+
| Power of  |               |                |             |
|   |               |                |             |
+-----------+---------------+----------------+-------------+
| Advance   |               |                |             |
| Directive |               |                |             |
+-----------+---------------+----------------+-------------+

## 2020-06-03 NOTE — XMS
Encounter Summary
  Created on: 2020
 
 Isak Hilario
 External Reference #: [id number]
 : 30
 Sex: Male
 
 Demographics
 
 
+-----------------------+----------------------+
| Address               | [address]         |
|                       | [place]  [address] |
+-----------------------+----------------------+
| Home Phone            | [phone]      |
+-----------------------+----------------------+
| Preferred Language    | Unknown              |
+-----------------------+----------------------+
| Marital Status        |               |
+-----------------------+----------------------+
| Synagogue Affiliation | Unknown              |
+-----------------------+----------------------+
| Race                  | Unknown              |
+-----------------------+----------------------+
| Ethnic Group          | Unknown              |
+-----------------------+----------------------+
 
 
 Author
 
 
+--------------+--------------------------------------------+
| Author       | [facility]  |
|              | and [place]                                |
+--------------+--------------------------------------------+
| Organization | [facility]  |
|              | and [place]                                |
+--------------+--------------------------------------------+
| Address      | Unknown                                    |
+--------------+--------------------------------------------+
| Phone        | Unavailable                                |
+--------------+--------------------------------------------+
 
 
 
 Support
 
 
+-------------+--------------+---------+-----------------+
| Name        | Relationship | Address | Phone           |
+-------------+--------------+---------+-----------------+
| Alessandra Saxena | ECON         | Unknown | [phone] |
+-------------+--------------+---------+-----------------+
 
 
 
 Care Team Providers
 
 
 
+-----------------------+------+-------------+
| Care Team Member Name | Role | Phone       |
+-----------------------+------+-------------+
 PCP  | Unavailable |
+-----------------------+------+-------------+
 
 
 
 Reason for Visit
 
 
+-------------------+----------+
| Reason            | Comments |
+-------------------+----------+
| Medication Refill |          |
+-------------------+----------+
 
 
 
 Encounter Details
 
 
+--------+--------+----------------------+----------------------+-------------------+
| Date   | Type   | Department           | Care Team            | Description       |
+--------+--------+----------------------+----------------------+-------------------+
| 10/ | Refill |   [facility]      |   Kyree Smith PA  | Medication Refill |
|    |        | SLEEP DISORDER  [address]  |  [address]     |                   |
|        |        | [address]      | [place]      |                   |
|        |        | [place] [address] | [address]  [phone]  |                   |
|        |        |   [phone]       |  [phone] (Fax)  |                   |
+--------+--------+----------------------+----------------------+-------------------+
 
 
 
 Social History
 
 
+----------------+-------+-----------+--------+------+
| Tobacco Use    | Types | Packs/Day | Years  | Date |
|                |       |           | Used   |      |
+----------------+-------+-----------+--------+------+
| Never Assessed |       |           |        |      |
+----------------+-------+-----------+--------+------+
 
 
 
+------------------+---------------+
| Sex Assigned at  | Date Recorded |
| Birth            |               |
+------------------+---------------+
| Not on file      |               |
+------------------+---------------+
 
 
 
+----------------+-------------+-------------+
| Job Start Date | Occupation  | Industry    |
+----------------+-------------+-------------+
 
| Not on file    | Not on file | Not on file |
+----------------+-------------+-------------+
 
 
 
+----------------+--------------+------------+
| Travel History | Travel Start | Travel End |
+----------------+--------------+------------+
 
 
 
+-------------------------------------+
| No recent travel history available. |
+-------------------------------------+
 documented as of this encounter
 
 Plan of Treatment
 Not on filedocumented as of this encounter
 
 Visit Diagnoses
 
 
+---------------------------------------------------------+
| Diagnosis                                               |
+---------------------------------------------------------+
|   Obstructive sleep apnea (adult) (pediatric) - Primary |
+---------------------------------------------------------+
 documented in this encounter

## 2020-06-03 NOTE — XMS
Encounter Summary
  Created on: 2020
 
 Isak Hilario
 External Reference #: [id number]
 : 30
 Sex: Male
 
 Demographics
 
 
+-----------------------+----------------------+
| Address               | [address]         |
|                       | [place]  [address] |
+-----------------------+----------------------+
| Home Phone            | [phone]      |
+-----------------------+----------------------+
| Preferred Language    | Unknown              |
+-----------------------+----------------------+
| Marital Status        |               |
+-----------------------+----------------------+
| Adventism Affiliation | Unknown              |
+-----------------------+----------------------+
| Race                  | Unknown              |
+-----------------------+----------------------+
| Ethnic Group          | Unknown              |
+-----------------------+----------------------+
 
 
 Author
 
 
+--------------+--------------------------------------------+
| Author       | [facility]  |
|              | and [place]                                |
+--------------+--------------------------------------------+
| Organization | [facility]  |
|              | and [place]                                |
+--------------+--------------------------------------------+
| Address      | Unknown                                    |
+--------------+--------------------------------------------+
| Phone        | Unavailable                                |
+--------------+--------------------------------------------+
 
 
 
 Support
 
 
+-------------+--------------+---------+-----------------+
| Name        | Relationship | Address | Phone           |
+-------------+--------------+---------+-----------------+
| Alessandra Saxena | ECON         | Unknown | [phone] |
+-------------+--------------+---------+-----------------+
 
 
 
 Care Team Providers
 
 
 
+-----------------------+------+-----------------+
| Care Team Member Name | Role | Phone           |
+-----------------------+------+-----------------+
| Bari Ha MD | PCP  | [phone] |
+-----------------------+------+-----------------+
 
 
 
 Reason for Visit
 
 
+-----------------+----------+
| Reason          | Comments |
+-----------------+----------+
| Anticoagulation |          |
+-----------------+----------+
 
 
 
 Encounter Details
 
 
+--------+-----------+----------------------+----------------------+----------------------+
| Date   | Type      | Department           | Care Team            | Description          |
+--------+-----------+----------------------+----------------------+----------------------+
| / | Off-Site  |   [facility] |   Jason Braga,   | Atrial fibrillation, |
| 2020   | Visit     |  CLINIC  [address]   | ARNP  [address]   |  unspecified type    |
|        |           | Street  [place], | [place], [place]      | (HCC) (Primary Dx);  |
|        |           |  [place] [address]       | [address]  [phone]  | Monitoring for       |
|        |           | [phone]         |  [phone] (Fax)  | long-term            |
|        |           |                      |                      | anticoagulant use;   |
|        |           |                      |                      | Non-healing surgical |
|        |           |                      |                      |  wound, initial      |
|        |           |                      |                      | encounter            |
+--------+-----------+----------------------+----------------------+----------------------+
 
 
 
 Social History
 
 
+--------------+-------+-----------+--------+------+
| Tobacco Use  | Types | Packs/Day | Years  | Date |
|              |       |           | Used   |      |
+--------------+-------+-----------+--------+------+
| Never Smoker |       |           |        |      |
+--------------+-------+-----------+--------+------+
 
 
 
+---------------------+------+---+----------+
| Smokeless Tobacco:  | Chew |   | Quit:    |
| Former User         |      |   | 19 |
|                     |      |   | 85       |
+---------------------+------+---+----------+
 
 
 
 
+-------------+-------------+---------+----------+
| Alcohol Use | Drinks/Week | oz/Week | Comments |
+-------------+-------------+---------+----------+
| Not Asked   |             |         |          |
+-------------+-------------+---------+----------+
 
 
 
+------------------+---------------+
| Sex Assigned at  | Date Recorded |
| Birth            |               |
+------------------+---------------+
| Not on file      |               |
+------------------+---------------+
 
 
 
+----------------+-------------+-------------+
| Job Start Date | Occupation  | Industry    |
+----------------+-------------+-------------+
| Not on file    | Not on file | Not on file |
+----------------+-------------+-------------+
 
 
 
+----------------+--------------+------------+
| Travel History | Travel Start | Travel End |
+----------------+--------------+------------+
 
 
 
+-------------------------------------+
| No recent travel history available. |
+-------------------------------------+
 documented as of this encounter
 
 Last Filed Vital Signs
 
 
+-------------------+---------------------+----------------------+----------+
| Vital Sign        | Reading             | Time Taken           | Comments |
+-------------------+---------------------+----------------------+----------+
| Blood Pressure    | 127/67              | 2020  7:36 AM  |          |
|                   |                     | PDT                  |          |
+-------------------+---------------------+----------------------+----------+
| Pulse             | 64                  | 2020  7:36 AM  |          |
|                   |                     | PDT                  |          |
+-------------------+---------------------+----------------------+----------+
| Temperature       | 36.2   C (97.2   F) | 2020  7:36 AM  |          |
|                   |                     | PDT                  |          |
+-------------------+---------------------+----------------------+----------+
| Respiratory Rate  | 20                  | 2020  7:36 AM  |          |
|                   |                     | PDT                  |          |
+-------------------+---------------------+----------------------+----------+
| Oxygen Saturation | 96%                 | 2020  7:36 AM  |          |
|                   |                     | PDT                  |          |
+-------------------+---------------------+----------------------+----------+
| Inhaled Oxygen    | -                   | -                    |          |
| Concentration     |                     |                      |          |
+-------------------+---------------------+----------------------+----------+
 
| Weight            | -                   | -                    |          |
+-------------------+---------------------+----------------------+----------+
| Height            | -                   | -                    |          |
+-------------------+---------------------+----------------------+----------+
| Body Mass Index   | -                   | -                    |          |
+-------------------+---------------------+----------------------+----------+
 documented in this encounter
 
 Progress Notes
 Jason Braga ARNP - 2020  6:30 AM [facility] 
 
 Patient Name: Isak Hilario | Age: 89 y.o. | : 1930 | Medical Record Number:[id number]
[id number] | Author: HANNAH Turner | Date of Encounter: 3/23/2020
 
 Subjective:
 HPI:
 Anticoagulation: Patient here for followup of chronic anticoagulation.
 Indication: No diagnosis found.
 
 Patient's medications, allergies, past medical, surgical, social and family histories were 
obtained and reviewed as appropriate.
 
 ROS:
 See HPI
 
 Objective:
 There were no vitals taken for this visit.
 
 Physical Exam 
 Constitutional: He appears well-developed and well-nourished. 
 Cardiovascular: Normal rate and regular rhythm. 
 Pulmonary/Chest: Effort normal. He has no wheezes. He has no rales. 
 Musculoskeletal: Normal range of motion.    
    General: No tenderness, deformity or edema. 
 Neurological: He is alert. 
 Skin: Skin is warm and dry. 
 
 Assessment/Plan:
 There are no diagnoses linked to this encounter.
 See Anticoagulation Summary above.
 
 Continue current warfarin schedule. [id number]
 
 Seen also today for non-healing biopsy site on right temple.  Wound measures 0.7 x 1 x 0.1 
cm with dry non-granulating wound bed.  Has excessive tissue dryness with scarring.  Wound c
are order changed to applying hydrogel twice a day with no dressing to prevent further dryin
g out of the wound bed and irritation.
 
 Electronically signed by:
 HANNAH Turner
 3/23/2020 at 7:37 AM
 
 Portions of this chart may have been created with Dragon voice recognition software. Occasi
onal wrong-word or   sound-alike  substitutions may have occurred due to the inherent fuentes
itations of voice recognition software. Please read the chart carefully and recognize, using
 context, where these substitutions have occurred.Electronically signed by JULIA Joy at 2020  7:46 AM PDTdocumented in this encounter
 
 Plan of Treatment
 Not on filedocumented as of this encounter
 
 
 Procedures
 
 
+----------------+--------+-------------+----------------------+----------------------+
| Procedure Name | Priori | Date/Time   | Associated Diagnosis | Comments             |
|                | ty     |             |                      |                      |
+----------------+--------+-------------+----------------------+----------------------+
| POC PT/INR     | Routin | 2020  |   Atrial             |   Results for this   |
| FINGERSTICK    | e      |  6:50 AM    | fibrillation,        | procedure are in the |
|                |        | PDT         | unspecified type     |  results section.    |
|                |        |             | ([facility])  Monitoring    |                      |
|                |        |             | for long-term        |                      |
|                |        |             | anticoagulant use    |                      |
+----------------+--------+-------------+----------------------+----------------------+
 documented in this encounter
 
 Results
 POCT PT/INR fingerstick (2020  6:50 AM PDT)
 
+-----------+---------+-----------+------------+--------------+
| Component | Value   | Ref Range | Performed  | Pathologist  |
|           |         |           | At         | Signature    |
+-----------+---------+-----------+------------+--------------+
| INR, POC  | 3.2 (A) | 0.9 - 1.2 |            |              |
+-----------+---------+-----------+------------+--------------+
 
 
 
+----------+
| Specimen |
+----------+
| Blood    |
+----------+
 documented in this encounter
 
 Visit Diagnoses
 
 
+-----------------------------------------------------------------------------------------+
| Diagnosis                                                                               |
+-----------------------------------------------------------------------------------------+
|   Atrial fibrillation, unspecified type (HCC) - Primary                                 |
+-----------------------------------------------------------------------------------------+
|   Monitoring for long-term anticoagulant use  Encounter for long-term (current) use of  |
| anticoagulants                                                                          |
+-----------------------------------------------------------------------------------------+
|   Non-healing surgical wound, initial encounter                                         |
+-----------------------------------------------------------------------------------------+
 documented in this encounter

## 2020-06-03 NOTE — XMS
Encounter Summary
  Created on: 2020
 
 Isak Hilario
 External Reference #: [id number]
 : 30
 Sex: Male
 
 Demographics
 
 
+-----------------------+------------------------+
| Address               | [address]       |
|                       | [place]  [address]   |
+-----------------------+------------------------+
| Home Phone            | [phone]        |
+-----------------------+------------------------+
| Preferred Language    | Unknown                |
+-----------------------+------------------------+
| Marital Status        |                 |
+-----------------------+------------------------+
| Yarsani Affiliation | NON                    |
+-----------------------+------------------------+
| Race                  | White                  |
+-----------------------+------------------------+
| Ethnic Group          | Not  or  |
+-----------------------+------------------------+
 
 
 Author
 
 
+--------------+------------------------------+
| Author       | [facility] |
+--------------+------------------------------+
| Organization | [facility] |
+--------------+------------------------------+
| Address      | Unknown                      |
+--------------+------------------------------+
| Phone        | Unavailable                  |
+--------------+------------------------------+
 
 
 
 Support
 
 
+-------------------+--------------+-------------------+-----------------+
| Name              | Relationship | Address           | Phone           |
+-------------------+--------------+-------------------+-----------------+
| Jackie Hilario | ECON         | [address]      | [phone] |
|                   |              | [place], [place]   |                 |
|                   |              | [id number]             |                 |
+-------------------+--------------+-------------------+-----------------+
 
 
 
 Care Team Providers
 
 
 
+-----------------------+------+-------------+
| Care Team Member Name | Role | Phone       |
+-----------------------+------+-------------+
 PCP  | Unavailable |
+-----------------------+------+-------------+
 
 
 
 Encounter Details
 
 
+--------+-------------+----------------------+---------------------+------------------+
| Date   | Type        | Department           | Care Team           | Description      |
+--------+-------------+----------------------+---------------------+------------------+
| / | Procedure - |   [facility]   |   Record, Operation | Operative Report |
|    |             | [facility]  [address] |                     |                  |
|        | Transcribed |  [address]          |                     |                  |
|        |             | Mailcode:  [facility]    |                     |                  |
|        |             | [facility]       |                     |                  |
|        |             | [facility], [address]  |                     |                  |
|        |             |  [place], [place]        |                     |                  |
|        |             | [address]           |                     |                  |
|        |             | [phone]         |                     |                  |
+--------+-------------+----------------------+---------------------+------------------+
 
 
 
 Social History
 
 
+----------------+-------+-----------+--------+------+
| Tobacco Use    | Types | Packs/Day | Years  | Date |
|                |       |           | Used   |      |
+----------------+-------+-----------+--------+------+
| Never Assessed |       |           |        |      |
+----------------+-------+-----------+--------+------+
 
 
 
+------------------+---------------+
| Sex Assigned at  | Date Recorded |
| Birth            |               |
+------------------+---------------+
| Not on file      |               |
+------------------+---------------+
 
 
 
+----------------+-------------+-------------+
| Job Start Date | Occupation  | Industry    |
+----------------+-------------+-------------+
| Not on file    | Not on file | Not on file |
+----------------+-------------+-------------+
 
 
 
+----------------+--------------+------------+
| Travel History | Travel Start | Travel End |
 
+----------------+--------------+------------+
 
 
 
+-------------------------------------+
| No recent travel history available. |
+-------------------------------------+
 documented as of this encounter
 
 Plan of Treatment
 Not on filedocumented as of this encounter
 
 Procedures
 
 
+------------------+--------+-------------+----------------------+----------------------+
| Procedure Name   | Priori | Date/Time   | Associated Diagnosis | Comments             |
|                  | ty     |             |                      |                      |
+------------------+--------+-------------+----------------------+----------------------+
| OPERATION RECORD |        | 1998  |                      |   Results for this   |
|                  |        | 12:00 AM    |                      | procedure are in the |
|                  |        | PDT         |                      |  results section.    |
+------------------+--------+-------------+----------------------+----------------------+
 documented in this encounter
 
 Results
 OPERATION RECORD (1998 12:00 AM PDT)
 
+-------------------------------------------------------------------------------+
| Procedure Note                                                                |
+-------------------------------------------------------------------------------+
|   1998 12:00 AM PDT    [place]                                           |
|   [facility]                                                  |
|  [address], [place] [address] [phone]   |
|   [facility]                                            |
|                                                                               |
|  OPERATION RECORD                                                             |
|                                                                               |
|  Med Rec No.: [id number] Date: 98                                      |
|                                                                               |
|  Name: Isak Hilario                                                       |
|                                                                               |
|                                                                               |
|  ATTENDING SURGEON:                                                           |
|  [facility]                                                       |
|  Chief, Urologic Oncology                                                     |
|  , Surgery                                                 |
|  ASSISTANT(S):                                                                |
|  Hawk Garrison M.D.                                                       |
|  Resident, Urology                                                            |
|  PREOPERATIVE DIAGNOSIS: Stage T2b adenocarcinoma of the prostate.            |
|                                                                               |
|  POSTOPERATIVE DIAGNOSIS(ES): The same.                                       |
|                                                                               |
|  OPERATION(S) PERFORMED: 1. Bilateral pelvic lymph node dissection.           |
|   2. Radical retropubic prostatectomy.                                        |
|                                                                               |
|  ANESTHESIA: General.                                                         |
|                                                                               |
|  SPECIMEN(S) REMOVED: 1. Right and left pelvic lymph nodes.                   |
 
|   2. Prostate and seminal vesicles.                                           |
|                                                                               |
|  INDICATIONS: This is a 67-year-old man who was diagnosed                     |
|   with [place]'s 3+/4 adenocarcinoma of the                                   |
|   prostate in five of                                                         |
|  six sections of biopsies. He was diagnosed based on a nodule felt on rectal  |
|  examination, as well as a PSA of approximate 20. Repeat biopsies of seminal  |
|  vesicles revealed no evidence of extra prostatic disease. He was counseled   |
|  regarding his options and elected surgery. He was brought to the Operating   |
|  Room now for a radical prostatectomy.                                        |
|                                                                               |
|  FINDINGS: Bilateral pelvic lymph node dissections were                       |
|   performed, and no significant adenopathy was                                |
|   encountered. Frozen                                                         |
|  sections were performed by the pathologist, and no evidence of metastatic    |
|  tumor was seen.                                                              |
|                                                                               |
|  The prostate had no obvious extra prostatic disease or obvious extracapsular |
|  extension. No nerve sparing procedure was attempted. The bladder neck was    |
|  spared. Estimated blood loss was 450 ml. Total operative time was            |
|  approximately three hours.                                                   |
|                                                                               |
|  PROCEDURE: The patient was taken to the Operating Room,                      |
|   identified, and general anesthesia induced.                                 |
|   He was placed in the                                                        |
|  supine position and his abdomen and genitalia prepped and draped in the      |
|  usual sterile fashion. An 18 [place] Soto catheter was placed                |
|  transurethrally into the bladder. A midline incision was made, extending     |
|  from just below the umbilicus down to the level of the pubis, carried down   |
|  through the subcutaneous tissues, and the fascia was incised in the midline. |
|  The retropubic space was bluntly developed, exposing the anterior wall to    |
|  bladder, the prostate, and external iliac vessels. A self-retaining          |
|  retractor was then placed and, beginning with the left side, bilateral       |
|  pelvic lymph node dissections were performed. Margins of lymph node          |
|  dissection included the anterior border of the external iliac vein           |
|  anteriorly, the obturator nerve posteriorly, the bifurcation of the iliac    |
|  vessels superiorly, and Bimal's ligament inferiorly. All lymphatic tissue   |
|  within these borders was removed, and hemoclips were placed across all       |
|  lymphatic vessels prior to dividing them. Once the two bilateral lymph node  |
|  dissections were performed, the self-retaining retractor was repositioned    |
|  and the fat overlying the anterior portion of the prostate was excised,      |
|  using electrocautery. This exposed the endopelvic fascia.                    |
|                                                                               |
|  Once frozen sections were completed and they were read as having no evidence |
|  of metastatic disease, the endopelvic fascia was opened bilaterally, using   |
|  electrocautery. The levator ani muscles were then bluntly pushed laterally   |
|  off the lateral aspect of the prostate. The dorsal vein complex, including   |
|  the lateral edge of the incised endopelvic fascia, was then gathered in a    |
|  pair of Bard clamps and ligated proximally and distally with 0 chromic.   |
|  The dorsal vein was then divided and sharp dissection was used to dissect    |
|  the underlying apex of the prostate off of the dorsal vein complex. There    |
|  was some bleeding encountered from the dorsal vein complex at this point,    |
|  and it was oversewn with a 3-0 Biosin suture. The anterior wall of the       |
|  urethra was then divided and the Soto catheter was brought up through the   |
|  defect of the anterior wall of the urethra, clamped, and divided. It was     |
|  left in the bladder to use as a retractor through the remainder of the case. |
|  The posterior wall of the urethra was also sharply divided and the lateral   |
|  pillars on either side of the urethra were clipped and then divided. The     |
|  lateral pedicles to the prostate were then divided, with hemoclips being in  |
|  place across them, near the apex, and 0 silk sutures near the base. The 0    |
 
|  silk sutures were placed across the main pedicle of the prostate and tied    |
|  prior to dividing it. This allowed exposure of the lateral aspect of the     |
|  seminal vesicles on both sides, and an umbilical tape was passed between the |
|  seminal vessels and the bladder neck. The bladder neck was then dissected    |
|  off the prostate, using electrocautery. There was a small aspect of median   |
|  lobe prostate tissue, which was dissected out of the posterior bladder neck. |
|  The seminal vesicles were then dissected free of surrounding tissues, using  |
|  hemoclips to clip all vessels, and the ampullae of the vas were divided      |
|  after placing large hemoclips across them on both sides. The specimen was    |
|  then removed from the field. The epithelium of the bladder neck was          |
|  advanced, using a series of interrupted 3-0 chromic. The field was           |
|  inspected and no obvious bleeding was noted. A _____________ was placed      |
|                                                                               |
|  into the urethra and a series of anastomotic sutures were placed in the      |
|  urethral stump at 2 o'clock, 4 o'clock, 6 o'clock, 8 o'clock, and 10 o'clock |
|  positions. These were 0 chromic sutures. Sutures were then placed in         |
|  corresponding positions on the bladder neck, and a #20 [place] Soto catheter |
|  was placed through the urethra and into the bladder.                         |
|  The table was then flattened and the bladder neck brought into approximation |
|  with the urethral stump and the sutures tied into position. The wound as     |
|  irrigated. A 10 mm flat Kevin-Mendieta drain was placed adjacent to the       |
|  anastomosis and brought out through a separate stab wound to the left of the |
|  incision. It was secured to the skin with a 2-0 nylon. The fascia was closed |
|  with a running #1 Biosin, the subcutaneous tissue closed with a running 3-0  |
|  Vicryl, and the skin closed with a running 4-0 Vicryl subcuticular stitch.   |
|  Sterile dressings were applied. The patient tolerated the procedure well,    |
|  was extubated, and returned to the Post Anesthesia Care Unit in stable       |
|  condition.                                                                   |
|                                                                               |
|  Dr. Hernandez was present and actively participated in the entire procedure.      |
|                                                                               |
|  Hawk Garrison M.D.                                                       |
|  Resident, Urology                                                            |
|  [facility]                                                       |
|  Chief, Urologic Oncology                                                     |
|  , Surgery                                                 |
|  [facility]                                                                      |
|  D: 98                                                                  |
|  T: 98 7:51 A                                                           |
|                                                                               |
|                                                                               |
+-------------------------------------------------------------------------------+
 documented in this encounter
 
 Visit Diagnoses
 Not on filedocumented in this encounter

## 2020-06-03 NOTE — XMS
Encounter Summary
  Created on: 2020
 
 Isak Hilario
 External Reference #: [id number]
 : 30
 Sex: Male
 
 Demographics
 
 
+-----------------------+------------------------+
| Address               | [address]       |
|                       | [place]  [address]   |
+-----------------------+------------------------+
| Home Phone            | [phone]        |
+-----------------------+------------------------+
| Preferred Language    | Unknown                |
+-----------------------+------------------------+
| Marital Status        |                 |
+-----------------------+------------------------+
| Congregational Affiliation | NON                    |
+-----------------------+------------------------+
| Race                  | White                  |
+-----------------------+------------------------+
| Ethnic Group          | Not  or  |
+-----------------------+------------------------+
 
 
 Author
 
 
+--------------+------------------------------+
| Author       | [facility] |
+--------------+------------------------------+
| Organization | [facility] |
+--------------+------------------------------+
| Address      | Unknown                      |
+--------------+------------------------------+
| Phone        | Unavailable                  |
+--------------+------------------------------+
 
 
 
 Support
 
 
+-------------------+--------------+-------------------+-----------------+
| Name              | Relationship | Address           | Phone           |
+-------------------+--------------+-------------------+-----------------+
| Jackie Hilario | ECON         | [address]      | [phone] |
|                   |              | [place], [place]   |                 |
|                   |              | [id number]             |                 |
+-------------------+--------------+-------------------+-----------------+
 
 
 
 Care Team Providers
 
 
 
+-----------------------+------+-------------+
| Care Team Member Name | Role | Phone       |
+-----------------------+------+-------------+
 PCP  | Unavailable |
+-----------------------+------+-------------+
 
 
 
 Encounter Details
 
 
+--------+----------+----------------------+--------------------+-------------+
| Date   | Type     | Department           | Care Team          | Description |
+--------+----------+----------------------+--------------------+-------------+
| / | Results  |   [facility]  [address]  |   [facility]   |             |
|    | Only     | [address]  | [phone]       |             |
|        |          |  [place]        |                    |             |
|        |          | [address]           |                    |             |
|        |          | [phone]         |                    |             |
+--------+----------+----------------------+--------------------+-------------+
 
 
 
 Social History
 
 
+----------------+-------+-----------+--------+------+
| Tobacco Use    | Types | Packs/Day | Years  | Date |
|                |       |           | Used   |      |
+----------------+-------+-----------+--------+------+
| Never Assessed |       |           |        |      |
+----------------+-------+-----------+--------+------+
 
 
 
+------------------+---------------+
| Sex Assigned at  | Date Recorded |
| Birth            |               |
+------------------+---------------+
| Not on file      |               |
+------------------+---------------+
 
 
 
+----------------+-------------+-------------+
| Job Start Date | Occupation  | Industry    |
+----------------+-------------+-------------+
| Not on file    | Not on file | Not on file |
+----------------+-------------+-------------+
 
 
 
+----------------+--------------+------------+
| Travel History | Travel Start | Travel End |
+----------------+--------------+------------+
 
 
 
 
+-------------------------------------+
| No recent travel history available. |
+-------------------------------------+
 documented as of this encounter
 
 Plan of Treatment
 Not on filedocumented as of this encounter
 
 Procedures
 
 
+--------------------+--------+------------+----------------------+----------------------+
| Procedure Name     | Priori | Date/Time  | Associated Diagnosis | Comments             |
|                    | ty     |            |                      |                      |
+--------------------+--------+------------+----------------------+----------------------+
| SURGICAL PATHOLOGY | Routin | 1998 |                      |   Results for this   |
|                    | e      |            |                      | procedure are in the |
|                    |        |            |                      |  results section.    |
+--------------------+--------+------------+----------------------+----------------------+
 documented in this encounter
 
 Results
 SURGICAL PATHOLOGY (1998)
 
+-----------+--------------------------+-----------+-------------+--------------+
| Component | Value                    | Ref Range | Performed   | Pathologist  |
|           |                          |           | At          | Signature    |
+-----------+--------------------------+-----------+-------------+--------------+
| SURGICAL  | SOURCE OF SPECIMEN: SEE  |           | [facility]        |              |
| PATHOLOGY | RESULTS                  |           | DEPARTMENT  |              |
|           | Preliminary              |           | OF          |              |
|           | History:FROZEN SECTION   |           | PATHOLOGY   |              |
|           | DIAGNOSISRIGHT PELVIC    |           |             |              |
|           | NODE (SPECIMEN #1):   NO |           |             |              |
|           |  TUMOR SEENLEFT PELVIC   |           |             |              |
|           | NODE (SPECIMEN #2):   NO |           |             |              |
|           |  TUMOR SEEN              |           |             |              |
|           | Confirmed by: Bhavana       |           |             |              |
|           | BILL Leigh and Surya    |           |             |              |
|           | BILL Gonzalez           |           |             |              |
|           | CLINICAL HISTORY         |           |             |              |
|           | Patient Age: 67 year old |           |             |              |
|           |  man       Patient       |           |             |              |
|           | History: Prostate        |           |             |              |
|           | specific antigens (PSA)  |           |             |              |
|           | of 27 and high           |           |             |              |
|           | gradeadenocarcinoma of   |           |             |              |
|           | the prostate found by    |           |             |              |
|           | physical exam to have a  |           |             |              |
|           | nodule.Biopsy revealed   |           |             |              |
|           | extensive tumor. Rule    |           |             |              |
|           | out tumor extension      |           |             |              |
|           | beyond thecapsule and/or |           |             |              |
|           |  at the surgical         |           |             |              |
|           | margins.       GROSS     |           |             |              |
|           | DESCRIPTION              |           |             |              |
|           | Specimens received:      |           |             |              |
|           |   Two fresh, one in      |           |             |              |
|           | formalin       #1 RIGHT  |           |             |              |
|           | PELVIC NODE (FS):        |           |             |              |
 
|           | Received fresh are       |           |             |              |
|           | several                  |           |             |              |
|           | unorientedfragments of   |           |             |              |
|           | fibroadipose tissue      |           |             |              |
|           | measuring 5.5 x 2.7 x    |           |             |              |
|           | 1.5 cm. in               |           |             |              |
|           | looseaggregate. These    |           |             |              |
|           | tissues are sectioned    |           |             |              |
|           | and a representative     |           |             |              |
|           | lymph nodeis used for    |           |             |              |
|           | frozen section           |           |             |              |
|           | diagnosis. The remaining |           |             |              |
|           |  tissue fragments        |           |             |              |
|           | aresectioned.       #2   |           |             |              |
|           | LEFT PELVIS NODE (FS):   |           |             |              |
|           | Received fresh are       |           |             |              |
|           | several fragments        |           |             |              |
|           | ofunoriented             |           |             |              |
|           | fibroadipose tissue      |           |             |              |
|           | measuring 5.6 x 4.9 x    |           |             |              |
|           | 2.4 cm. in               |           |             |              |
|           | looseaggregate. These    |           |             |              |
|           | tissue fragments are     |           |             |              |
|           | sectioned.       #3      |           |             |              |
|           | PROSTATE: Received is a  |           |             |              |
|           | prostatectomy specimen   |           |             |              |
|           | weighing 52 g.           |           |             |              |
|           | andmeasuring 6 x 4.5 x   |           |             |              |
|           | 3.5 cm.       The        |           |             |              |
|           | attached portion of left |           |             |              |
|           |  seminal vesicle         |           |             |              |
|           | measures 2 x 1 x 1 cm.   |           |             |              |
|           | andthe left vas deferens |           |             |              |
|           |  measures 3 cm. in       |           |             |              |
|           | length by 0.5 cm. in     |           |             |              |
|           | diameter.The attached    |           |             |              |
|           | portion of right seminal |           |             |              |
|           |  vesicle measures 2.5 x  |           |             |              |
|           | 1 x 1 cm.and the right   |           |             |              |
|           | vas deferens measures    |           |             |              |
|           | 2.6 cm. in length by 0.5 |           |             |              |
|           |  cm. indiameter.         |           |             |              |
|           |       The left half of   |           |             |              |
|           | the specimen is inked    |           |             |              |
|           | black and the right half |           |             |              |
|           |  is inkedblue. The       |           |             |              |
|           | specimen is serially     |           |             |              |
|           | sectioned and notable    |           |             |              |
|           | for a tan                |           |             |              |
|           | poorlycircumscribed area |           |             |              |
|           |  along the middle left   |           |             |              |
|           | lobe which measures 1.7  |           |             |              |
|           | cm. ingreatest           |           |             |              |
|           | dimension. There is an   |           |             |              |
|           | underlying circumscribed |           |             |              |
|           |  white nodule ofpossible |           |             |              |
|           |  benign prostatic        |           |             |              |
|           | hypertrophy deep to this |           |             |              |
|           |  area measuring 1.5cm.   |           |             |              |
|           | in greatest diameter.    |           |             |              |
 
|           |     CASSETTE INDEX#1     |           |             |              |
|           | RIGHT PELVIC NODE        |           |             |              |
|           | (FS):1A, tissue used for |           |             |              |
|           |  frozen section          |           |             |              |
|           | diagnosis, resubmitted,  |           |             |              |
|           | RS1B, all possible lymph |           |             |              |
|           |  nodes, RS#2 LEFT PELVIS |           |             |              |
|           |  NODE (FS):2A, tissue    |           |             |              |
|           | used for frozen section  |           |             |              |
|           | diagnosis, resubmitted,  |           |             |              |
|           | RS2B, all possible lymph |           |             |              |
|           |  nodes, RS#3             |           |             |              |
|           | PROSTATE:3A-B, apical    |           |             |              |
|           | urethral margin, RS3C-D, |           |             |              |
|           |  surgical margin at      |           |             |              |
|           | base, RS3E-F, complete   |           |             |              |
|           | representative section   |           |             |              |
|           | of prostatic gpmr4J-A,   |           |             |              |
|           | right and left middle    |           |             |              |
|           | lobes including area of  |           |             |              |
|           | possible tumor, RS3J-K,  |           |             |              |
|           | prostatic base, RS3L-M,  |           |             |              |
|           | seminal vesicles and vas |           |             |              |
|           |  deferens, RS       All  |           |             |              |
|           | tissue sections taken    |           |             |              |
|           | are submitted for        |           |             |              |
|           | microscopic              |           |             |              |
|           | evaluation.WSS/KHADRA:tg     |           |             |              |
|           |           SYNOPTIC       |           |             |              |
|           | DESCRIPTION - PROSTATE,  |           |             |              |
|           | RADICAL PROSTATECTOMY    |           |             |              |
|           | FOR CARCINOMA            |           |             |              |
|           | TUMOR TYPE:              |           |             |              |
|           |   Adenocarcinoma of      |           |             |              |
|           | prostateGLEASON'S SCORE  |           |             |              |
|           | 3/5+4/5 = 7/10TUMOR      |           |             |              |
|           | LOCATION:   [place]:    |           |             |              |
|           |     RIGHT       LEFT     |           |             |              |
|           |                          |           |             |              |
|           |                   [place]   |           |             |              |
|           |         [place]             |           |             |              |
|           |                          |           |             |              |
|           |           Mid            |           |             |              |
|           | Mid                      |           |             |              |
|           |                          |           |             |              |
|           |  BaseVASCULAR INVASION:  |           |             |              |
|           |                          |           |             |              |
|           |   NOPERINEURAL INVASION: |           |             |              |
|           |                          |           |             |              |
|           |   YESTUMOR INVADES TO    |           |             |              |
|           | PROSTATIC APEX:          |           |             |              |
|           |   NOTUMOR INVADES INTO   |           |             |              |
|           | PROSTATIC CAPSULE, BUT   |           |             |              |
|           | NOT BEYOND:              |           |             |              |
|           |   YESSTAGING:            |           |             |              |
|           |   Extracapsular          |           |             |              |
|           | extension                |           |             |              |
|           |                          |           |             |              |
|           |        (Stage pT3a)      |           |             |              |
|           |           SURGICAL       |           |             |              |
 
|           | MARGINS:   Tumor present |           |             |              |
|           |  at surgical margin:     |           |             |              |
|           |       YES   Specify      |           |             |              |
|           | site: Left               |           |             |              |
|           | midNON-NEOPLASTIC        |           |             |              |
|           | PROSTATE:                |           |             |              |
|           |   Unremarkable:    NO    |           |             |              |
|           |   Abnormalities:         |           |             |              |
|           |      Nodular             |           |             |              |
|           | hyperplasiaLYMPH NODES:  |           |             |              |
|           |   Involved nodes/total   |           |             |              |
|           | nodes:   0/9       FINAL |           |             |              |
|           |  DIAGNOSIS#1 RIGHT       |           |             |              |
|           | PELVIC NODE:   FOUR      |           |             |              |
|           | LYMPH NODES WITH NO      |           |             |              |
|           | TUMOR SEEN (0/4)#2 LEFT  |           |             |              |
|           | PELVIS NODE:   FIVE      |           |             |              |
|           | LYMPH NODES WITH NO      |           |             |              |
|           | TUMOR SEEN (0/5)#3       |           |             |              |
|           | PROSTATE:   PROSTATIC    |           |             |              |
|           | ADENOCARCINOMA,          |           |             |              |
|           | ALINE'S GRADE 3/5+4/5, |           |             |              |
|           |  WITH PERINEURAL         |           |             |              |
|           |   INVASION   (SEE NOTE)  |           |             |              |
|           |       Note:   The tumor  |           |             |              |
|           | involves both lobes and  |           |             |              |
|           | is present at or near    |           |             |              |
|           | thecapsular margin (left |           |             |              |
|           |  mid, sections 3G and    |           |             |              |
|           | 3F).       Case reviewed |           |             |              |
|           |  by:   ELSA Das,  |           |             |              |
|           | Student FellowCase       |           |             |              |
|           | staffed by:   Jt TUCKER     |           |             |              |
|           | Sailaja,               |           |             |              |
|           | M.D.T:98/       My |           |             |              |
|           |  electronic signature    |           |             |              |
|           | indicates that I have    |           |             |              |
|           | personally reviewed      |           |             |              |
|           | alldiagnostic slides,    |           |             |              |
|           | the gross and/or         |           |             |              |
|           | microscopic portion of   |           |             |              |
|           | thisreport and           |           |             |              |
|           | formulated the final     |           |             |              |
|           | diagnosis.               |           |             |              |
+-----------+--------------------------+-----------+-------------+--------------+
 
 
 
+----------+
| Specimen |
+----------+
| Other    |
+----------+
 
 
 
+----------------------+------------------------+----------------------+--------------+
| Performing           | Address                | City/State/Zipcode   | Phone Number |
| Organization         |                        |                      |              |
+----------------------+------------------------+----------------------+--------------+
 
|   [facility] |   [address]  |   [place] [address] |              |
|  PATHOLOGY           | [address]                |                      |              |
+----------------------+------------------------+----------------------+--------------+
 documented in this encounter
 
 Visit Diagnoses
 Not on filedocumented in this encounter

## 2020-06-03 NOTE — XMS
Encounter Summary
  Created on: 2020
 
 Isak Hilario
 External Reference #: [id number]
 : 30
 Sex: Male
 
 Demographics
 
 
+-----------------------+----------------------+
| Address               | [address]         |
|                       | [place]  [address] |
+-----------------------+----------------------+
| Home Phone            | [phone]      |
+-----------------------+----------------------+
| Preferred Language    | Unknown              |
+-----------------------+----------------------+
| Marital Status        |               |
+-----------------------+----------------------+
| Shinto Affiliation | Unknown              |
+-----------------------+----------------------+
| Race                  | Unknown              |
+-----------------------+----------------------+
| Ethnic Group          | Unknown              |
+-----------------------+----------------------+
 
 
 Author
 
 
+--------------+--------------------------------------------+
| Author       | [facility]  |
|              | and [place]                                |
+--------------+--------------------------------------------+
| Organization | [facility]  |
|              | and [place]                                |
+--------------+--------------------------------------------+
| Address      | Unknown                                    |
+--------------+--------------------------------------------+
| Phone        | Unavailable                                |
+--------------+--------------------------------------------+
 
 
 
 Support
 
 
+-------------+--------------+---------+-----------------+
| Name        | Relationship | Address | Phone           |
+-------------+--------------+---------+-----------------+
| Alessandra Saxena | ECON         | Unknown | [phone] |
+-------------+--------------+---------+-----------------+
 
 
 
 Care Team Providers
 
 
 
+-----------------------+------+-----------------+
| Care Team Member Name | Role | Phone           |
+-----------------------+------+-----------------+
| Bari Ha MD | PCP  | [phone] |
+-----------------------+------+-----------------+
 
 
 
 Reason for Visit
 
 
+--------+----------+
| Reason | Comments |
+--------+----------+
| Apnea  |          |
+--------+----------+
 
 
 
 Encounter Details
 
 
+--------+---------+----------------------+----------------------+----------------------+
| Date   | Type    | Department           | Care Team            | Description          |
+--------+---------+----------------------+----------------------+----------------------+
| / | Office  |   [facility]      |   Kyree Smith PA  | MISTI on CPAP (Primary |
| 2014   | Visit   | SLEEP DISORDER  [address]  |  [address]     |  Dx)                 |
|        |         | [address]      | [place]      |                      |
|        |         | [place] [address] | [address]  [phone]  |                      |
|        |         |   [phone]       |  [phone] (Fax)  |                      |
+--------+---------+----------------------+----------------------+----------------------+
 
 
 
 Social History
 
 
+--------------+-------+-----------+--------+------+
| Tobacco Use  | Types | Packs/Day | Years  | Date |
|              |       |           | Used   |      |
+--------------+-------+-----------+--------+------+
| Never Smoker |       |           |        |      |
+--------------+-------+-----------+--------+------+
 
 
 
+-------------+-------------+---------+----------+
| Alcohol Use | Drinks/Week | oz/Week | Comments |
+-------------+-------------+---------+----------+
| Not Asked   |             |         |          |
+-------------+-------------+---------+----------+
 
 
 
+------------------+---------------+
| Sex Assigned at  | Date Recorded |
| Birth            |               |
+------------------+---------------+
 
| Not on file      |               |
+------------------+---------------+
 
 
 
+----------------+-------------+-------------+
| Job Start Date | Occupation  | Industry    |
+----------------+-------------+-------------+
| Not on file    | Not on file | Not on file |
+----------------+-------------+-------------+
 
 
 
+----------------+--------------+------------+
| Travel History | Travel Start | Travel End |
+----------------+--------------+------------+
 
 
 
+-------------------------------------+
| No recent travel history available. |
+-------------------------------------+
 documented as of this encounter
 
 Last Filed Vital Signs
 
 
+-------------------+----------------------+----------------------+----------+
| Vital Sign        | Reading              | Time Taken           | Comments |
+-------------------+----------------------+----------------------+----------+
| Blood Pressure    | 122/66               | 2014 10:47 AM  |          |
|                   |                      | PDT                  |          |
+-------------------+----------------------+----------------------+----------+
| Pulse             | 110                  | 2014 10:47 AM  |          |
|                   |                      | PDT                  |          |
+-------------------+----------------------+----------------------+----------+
| Temperature       | -                    | -                    |          |
+-------------------+----------------------+----------------------+----------+
| Respiratory Rate  | 16                   | 2014 10:47 AM  |          |
|                   |                      | PDT                  |          |
+-------------------+----------------------+----------------------+----------+
| Oxygen Saturation | 96%                  | 2014 10:47 AM  |          |
|                   |                      | PDT                  |          |
+-------------------+----------------------+----------------------+----------+
| Inhaled Oxygen    | -                    | -                    |          |
| Concentration     |                      |                      |          |
+-------------------+----------------------+----------------------+----------+
| Weight            | 99.8 kg (220 lb 1.6  | 2014 10:47 AM  |          |
|                   | oz)                  | PDT                  |          |
+-------------------+----------------------+----------------------+----------+
| Height            | -                    | -                    |          |
+-------------------+----------------------+----------------------+----------+
| Body Mass Index   | 34.47                | 2013 11:18 AM  |          |
|                   |                      | PST                  |          |
+-------------------+----------------------+----------------------+----------+
 documented in this encounter
 
 Progress Notes
 Kyree Smith PA - 2014 10:54 AM PDTFormatting of this note might be different from 
the original.
 
 Subjective: 
  
 Patient ID: Isak Hilario is a 83 y.o. male.
 
 HPI
 
 last office visit was:  10/29/2010
 date of polysomnography: 1999 
 AHI: 
 RDI: 99.7
 O2%: 87% 
 Machine type: [employer] with nasal mask
 obtained from:  [facility] [place] 
 pressure: 12 cm
 Nights using CPAP: 254/254
 % of nights >4 hours:  99%
 average usage (all nights): 7.6
 average usage (nights used): 7.6
 AHI: n/a
 
 Isak comes in for CPAP compliance.  He continues to have excellent compliance, wearing his
 CPAP on a nightly basis for the duration of his sleep.  He cannot sleep without it.  His cu
rrent CPAP appears to be nearly nine years old.  He would likely benefit from using a variab
le pressure machine.  His pressure has not been checked since .  He feels that his apnea
 is we controlled and his machine is still working well.  He will consider changing it when 
necessary.  We discussed when he is able to replace his equipment.  I gave him a handout lizzy
t shows when his insurance will accept him replacing each item.  We also discussed the recom
mended cleaning schedule for his equipment.   He cleans his equipment regularly and replaces
 his mask pretty consistently, but has not replaced his heated tube in several years.
 
 I have discussed the download and results of the paperwork in detail.  He is unchanged or i
mproved in nearly all categories, with no areas of concern.  The download shows that his cornelius
ks are well controlled. 
 
  
 
 Review of Systems
 
 Objective: 
 Physical Exam
 
 Assessment: 
 
 Problem #1: OBSTRUCTIVE SLEEP APNEA (327.23)
 This is controlled with CPAP.  His CPAP compliance is going well.  His machine is nearly ni
ne years old and could be replaced, but he feels that it is still working well.
 
 Plan: 
 
 He is to continue with CPAP indefinitely.  I have recommended that he replace his CPAP when
 he notices that it is not working properly.  I have also recommended that he touch base wit
h his medical supplier twice per year to ensure that his equipment is satisfactory. 
 
 I will follow up again in 2 years, sooner prn.  At that time we will reassess with all appr
opiate paperwork.  Thirty minutes were spent face-to-face, with the majority of time spent i
n counseling.
 
 Kyree Smith PA-C
 
 cc: 
 
 Bari Ha MD 
 
 Electronically signed by KWABENA Cleveland at 2014 12:47 PM Magaly Cai, Master 
of Arts - 2014 10:32 AM PDTFormatting of this note might be different from the origina
l.
  14 1000 
 Agrawal Depression Inventory-II 
 Depression Score 8 - Minimal depression 
 Insomnia Severity Index 
 Insomnia Severity Index 2  
 Paoli Sleepiness Scale 
 Sitting and reading 1 
 Watching TV 1 
 Sitting, inactive in a public place (e.g. a theatre or a meeting) 0 
 As a passenger in a car for an hour without a break 0 
 Lying down to rest in the afternoon when circumstances permit 1 
 Sitting and talking to someone 0 
 Sitting quietly after a lunch without alcohol 1 
 In a car, while stopped for a few minutes in traffic 0 
 Total score 4  
 SF-36v2  Score 
 PF 42.3  
 RP 39.71  
 BP 46.06  
 GH 50.55  
 VT 45.85  
 SF 56.85  
 RE 44.22  
 MH 55.64  
 PCS 41.7  
 MCS 54.51  
  Electronically signed by KWABENA Cleveland at 2014 12:47 PM PDTdocumented in this enc
ounter
 
 Plan of Treatment
 Not on filedocumented as of this encounter
 
 Visit Diagnoses
 
 
+----------------------------------------------------------------------+
| Diagnosis                                                            |
+----------------------------------------------------------------------+
|   MISTI on CPAP - Primary  Obstructive sleep apnea (adult) (pediatric) |
+----------------------------------------------------------------------+
 documented in this encounter

## 2020-06-03 NOTE — XMS
Encounter Summary
  Created on: 2020
 
 Isak Hilario
 External Reference #: [id number]
 : 30
 Sex: Male
 
 Demographics
 
 
+-----------------------+------------------------+
| Address               | [address]       |
|                       | [place]  [address]   |
+-----------------------+------------------------+
| Home Phone            | [phone]        |
+-----------------------+------------------------+
| Preferred Language    | Unknown                |
+-----------------------+------------------------+
| Marital Status        |                 |
+-----------------------+------------------------+
| Mormon Affiliation | NON                    |
+-----------------------+------------------------+
| Race                  | White                  |
+-----------------------+------------------------+
| Ethnic Group          | Not  or  |
+-----------------------+------------------------+
 
 
 Author
 
 
+--------------+------------------------------+
| Author       | [facility] |
+--------------+------------------------------+
| Organization | [facility] |
+--------------+------------------------------+
| Address      | Unknown                      |
+--------------+------------------------------+
| Phone        | Unavailable                  |
+--------------+------------------------------+
 
 
 
 Support
 
 
+-------------------+--------------+-------------------+-----------------+
| Name              | Relationship | Address           | Phone           |
+-------------------+--------------+-------------------+-----------------+
| Jackie Hilario | ECON         | [address]      | [phone] |
|                   |              | [place], [place]   |                 |
|                   |              | [address]             |                 |
+-------------------+--------------+-------------------+-----------------+
 
 
 
 Care Team Providers
 
 
 
+-----------------------+------+-------------+
| Care Team Member Name | Role | Phone       |
+-----------------------+------+-------------+
 PCP  | Unavailable |
+-----------------------+------+-------------+
 
 
 
 Encounter Details
 
 
+--------+-------------+----------------------+--------------------+-------------+
| Date   | Type        | Department           | Care Team          | Description |
+--------+-------------+----------------------+--------------------+-------------+
| / | Documentati |   Health Information |   Other, Faculty   |             |
| 2019   | on          |  Services  [address]    | [phone]       |             |
|        |             | [address]  |                    |             |
|        |             |  Mailcode: OP17A     |                    |             |
|        |             | [facility]  |                    |             |
|        |             | [place], [place]  |                    |             |
|        |             | [address]           |                    |             |
|        |             | [phone]         |                    |             |
+--------+-------------+----------------------+--------------------+-------------+
 
 
 
 Social History
 
 
+----------------+-------+-----------+--------+------+
| Tobacco Use    | Types | Packs/Day | Years  | Date |
|                |       |           | Used   |      |
+----------------+-------+-----------+--------+------+
| Never Assessed |       |           |        |      |
+----------------+-------+-----------+--------+------+
 
 
 
+------------------+---------------+
| Sex Assigned at  | Date Recorded |
| Birth            |               |
+------------------+---------------+
| Not on file      |               |
+------------------+---------------+
 
 
 
+----------------+-------------+-------------+
| Job Start Date | Occupation  | Industry    |
+----------------+-------------+-------------+
| Not on file    | Not on file | Not on file |
+----------------+-------------+-------------+
 
 
 
+----------------+--------------+------------+
| Travel History | Travel Start | Travel End |
+----------------+--------------+------------+
 
 
 
 
+-------------------------------------+
| No recent travel history available. |
+-------------------------------------+
 documented as of this encounter
 
 Plan of Treatment
 Not on filedocumented as of this encounter
 
 Visit Diagnoses
 Not on filedocumented in this encounter History  Chief Complaint   Patient presents with    Shortness of Breath     pt c/o SOB at home tonight  no relief with inhaler  c/o chest pain with cough  Patient is a 27year old male with 24 hours of worsening pleuritic chest pain with productive cough with yellow sputum and fever  No N/V  No travel  (+) smokes  Used inhaler at home  Has a h/o asthma  Was last seen in this ED on 11/21/17 for [facility] website checked on this patient and last Rx filled was on 5/30/17 for percocet for 3 day supply  No CAD in family  History provided by:  Patient   used: No    Shortness of Breath   Associated symptoms: chest pain, cough and fever    Associated symptoms: no vomiting        Prior to Admission Medications   Prescriptions Last Dose Informant Patient Reported? Taking?   gabapentin (NEURONTIN) 600 MG tablet   Yes Yes   Sig: Take 600 mg by mouth daily at bedtime   guaiFENesin (ROBITUSSIN) 100 MG/5ML oral liquid   No Yes   Sig: Take 5-10 mL by mouth every 4 (four) hours as needed for cough   ibuprofen (MOTRIN) 600 mg tablet   No Yes   Sig: Take 1 tablet by mouth every 6 (six) hours as needed for mild pain   tapentadol (NUCYNTA) 50 mg tablet   Yes No   Sig: Take 50 mg by mouth as needed (instant relief)   tapentadol (NUCYNTA) tablet   Yes No   Sig: Take 100 mg by mouth 2 (two) times a day   tiZANidine (ZANAFLEX) 4 mg tablet   Yes Yes   Sig: Take by mouth 2 (two) times a day      Facility-Administered Medications: None       Past Medical History:   Diagnosis Date    Asthma        Past Surgical History:   Procedure Laterality Date    NECK SURGERY         History reviewed  No pertinent family history  I have reviewed and agree with the history as documented      Social History   Substance Use Topics    Smoking status: Light Tobacco Smoker     Packs/day: 0 50     Types: Cigarettes    Smokeless tobacco: Never Used    Alcohol use Yes      Comment: occasional        Review of Systems Constitutional: Positive for fever  Respiratory: Positive for cough and shortness of breath  Cardiovascular: Positive for chest pain  Gastrointestinal: Negative for nausea and vomiting  All other systems reviewed and are negative  Physical Exam  ED Triage Vitals   Temperature Pulse Respirations Blood Pressure SpO2   05/14/18 2345 05/14/18 2344 05/14/18 2344 05/14/18 2345 05/14/18 2344   100 1 °F (37 8 °C) 74 16 136/73 99 %      Temp Source Heart Rate Source Patient Position - Orthostatic VS BP Location FiO2 (%)   05/14/18 2345 05/15/18 0144 05/15/18 0144 05/15/18 0144 --   Oral Monitor Lying Left arm       Pain Score       05/14/18 2344       4           Orthostatic Vital Signs  Vitals:    05/14/18 2344 05/14/18 2345 05/15/18 0144 05/15/18 0245   BP:  136/73 131/63    Pulse: 74  88 86   Patient Position - Orthostatic VS:   Lying        Physical Exam   Constitutional: He is oriented to person, place, and time  He appears well-developed and well-nourished  He appears distressed (moderate)  HENT:   Head: Normocephalic and atraumatic  Mouth/Throat: Oropharynx is clear and moist    Eyes: No scleral icterus  Neck: No JVD present  No tracheal deviation present  Cardiovascular: Normal rate, regular rhythm and normal heart sounds  No murmur heard  Pulmonary/Chest: Effort normal and breath sounds normal  No stridor  No respiratory distress  He has no wheezes  He has no rales  He exhibits no tenderness  Abdominal: Soft  Bowel sounds are normal  There is no tenderness  Musculoskeletal: He exhibits no edema, tenderness (No calf tenderness) or deformity  Neurological: He is alert and oriented to person, place, and time  Skin: Skin is warm and dry  No rash noted  Psychiatric: He has a normal mood and affect  Nursing note and vitals reviewed        ED Medications  Medications   azithromycin (ZITHROMAX) tablet 500 mg (not administered)   albuterol (PROVENTIL HFA,VENTOLIN HFA) inhaler 2 puff (not administered)   benzonatate (TESSALON PERLES) capsule 200 mg (not administered)   sodium chloride 0 9 % bolus 1,000 mL (1,000 mL Intravenous New Bag 5/15/18 0205)   ketorolac (TORADOL) injection 30 mg (30 mg Intravenous Given 5/15/18 0230)   albuterol inhalation solution 2 5 mg (2 5 mg Nebulization Given 5/15/18 0235)       Diagnostic Studies  Results Reviewed     Procedure Component Value Units Date/Time    CK Total with Reflex CKMB [[id number]]  (Normal) Collected:  05/15/18 0158    Lab Status:  Final result Specimen:  Blood from Arm, Left Updated:  05/15/18 0224     Total CK 79 U/L     Troponin I [[id number]]  (Normal) Collected:  05/15/18 0158    Lab Status:  Final result Specimen:  Blood from Arm, Left Updated:  05/15/18 0224     Troponin I <0 02 ng/mL     Narrative:         [employer] Chemistry analyzer 99% cutoff is > 0 04 ng/mL in network labs    o cTnI 99% cutoff is useful only when applied to patients in the clinical setting of myocardial ischemia  o cTnI 99% cutoff should be interpreted in the context of clinical history, ECG findings and possibly cardiac imaging to establish correct diagnosis  o cTnI 99% cutoff may be suggestive but clearly not indicative of a coronary event without the clinical setting of myocardial ischemia      Comprehensive metabolic panel [[id number]] Collected:  05/15/18 0158    Lab Status:  Final result Specimen:  Blood from Arm, Left Updated:  05/15/18 5336     Sodium 138 mmol/L      Potassium 3 8 mmol/L      Chloride 103 mmol/L      CO2 23 mmol/L      Anion Gap 12 mmol/L      BUN 9 mg/dL      Creatinine 0 98 mg/dL      Glucose 104 mg/dL      Calcium 8 5 mg/dL      AST 36 U/L      ALT 73 U/L      Alkaline Phosphatase 112 U/L      Total Protein 7 5 g/dL      Albumin 3 7 g/dL      Total Bilirubin 0 40 mg/dL      eGFR 103 ml/min/1 73sq m     Narrative:         National Kidney Disease Education Program recommendations are as follows:  GFR calculation is accurate only with a steady state creatinine  Chronic Kidney disease less than 60 ml/min/1 73 sq  meters  Kidney failure less than 15 ml/min/1 73 sq  meters  D-Dimer [[id number]]  (Normal) Collected:  05/15/18 0158    Lab Status:  Final result Specimen:  Blood from Arm, Left Updated:  05/15/18 0220     D-Dimer, Quant <270 ng/ml (FEU)     Protime-INR [[id number]]  (Normal) Collected:  05/15/18 0158    Lab Status:  Final result Specimen:  Blood from Arm, Left Updated:  05/15/18 0218     Protime 12 6 seconds      INR 0 92    APTT [[id number]]  (Normal) Collected:  05/15/18 0158    Lab Status:  Final result Specimen:  Blood from Arm, Left Updated:  05/15/18 0218     PTT 33 seconds     Blood culture #1 [[id number]] Collected:  05/15/18 0204    Lab Status: In process Specimen:  Blood from Arm, Right Updated:  05/15/18 0213    CBC and differential [[id number]]  (Abnormal) Collected:  05/15/18 0158    Lab Status:  Final result Specimen:  Blood from Arm, Left Updated:  05/15/18 0208     WBC 10 15 Thousand/uL      RBC 5 87 (H) Million/uL      Hemoglobin 16 5 g/dL      Hematocrit 49 3 %      MCV 84 fL      MCH 28 1 pg      MCHC 33 5 g/dL      RDW 12 6 %      MPV 11 2 fL      Platelets 340 Thousands/uL      Neutrophils Relative 78 (H) %      Lymphocytes Relative 14 %      Monocytes Relative 7 %      Eosinophils Relative 1 %      Basophils Relative 0 %      Neutrophils Absolute 7 91 (H) Thousands/µL      Lymphocytes Absolute 1 45 Thousands/µL      Monocytes Absolute 0 67 Thousand/µL      Eosinophils Absolute 0 10 Thousand/µL      Basophils Absolute 0 02 Thousands/µL     Blood culture #2 [[id number]] Collected:  05/15/18 0158    Lab Status: In process Specimen:  Blood from Arm, Left Updated:  05/15/18 0202                 XR chest 2 views   ED Interpretation by Silvestre Ornelas MD (05/15 7206)   No acute disease read by me                    Procedures  ECG 12 Lead Documentation  Date/Time: 5/15/2018 1:49 AM  Performed by: Audra Lehman  Authorized by: Isa Syed Augustus CASTRO     Indications / Diagnosis:  Chest pain  ECG reviewed by me, the ED Provider: yes    Patient location:  ED  Previous ECG:     Previous ECG:  Compared to current    Comparison ECG info:  12/21/13    Similarity:  Changes noted (no s  libra now)  Rate:     ECG rate:  79    ECG rate assessment: normal    Rhythm:     Rhythm: sinus rhythm      Rhythm comment:  S  arrhythmia  Ectopy:     Ectopy: none    QRS:     QRS axis:  Normal    QRS intervals: short MA  ST segments:     ST segments:  Normal  T waves:     T waves: normal             Phone Contacts  ED Phone Contact    ED Course  ED Course as of May 15 0325   Tue May 15, 2018   4016 Labs and CXR d/w patient  MDM  Number of Diagnoses or Management Options  Diagnosis management comments: DDX including but not limited to: chest wall pain, pleurisy, costochondritis, pericarditis, myocarditis, PTX, pneumonia, URI, viral illness, GI etiology; doubt ACS or MI or dissection or PE or rhabdomyolysis  Amount and/or Complexity of Data Reviewed  Clinical lab tests: ordered and reviewed  Tests in the radiology section of CPT®: ordered and reviewed  Decide to obtain previous medical records or to obtain history from someone other than the patient: yes  Review and summarize past medical records: yes  Independent visualization of images, tracings, or specimens: yes      CritCare Time    Disposition  Final diagnoses:   Bronchitis   Chest pain     Time reflects when diagnosis was documented in both MDM as applicable and the Disposition within this note     Time User Action Codes Description Comment    5/15/2018  3:24 AM Lou Sotelo Add [J40] Bronchitis     5/15/2018  3:24 AM Lou Sotelo Add [R07 9] Chest pain       ED Disposition     ED Disposition Condition Comment    Discharge  Natasha Kenwood discharge to home/self care      Condition at discharge: Stable        Follow-up Information     Follow up With Specialties Details Why Connie Cyr MD  Call in 1 day motrin/tylenol for pain, fever  Return sooner if increased pain, fever, vomiting, difficulty breathing, rash  Use inhaler 1-2 puffs every 4 hours as needed for difficulty breathing  [facility]  [phone]          Patient's Medications   Discharge Prescriptions    AZITHROMYCIN (ZITHROMAX) 250 MG TABLET    Take 1 tablet (250 mg total) by mouth daily for 4 days       Start Date: 5/16/2018 End Date: 5/20/2018       Order Dose: 250 mg       Quantity: 4 tablet    Refills: 0    BENZONATATE (TESSALON) 200 MG CAPSULE    Take 1 capsule (200 mg total) by mouth 3 (three) times a day as needed for cough for up to 7 days       Start Date: 5/15/2018 End Date: 5/22/2018       Order Dose: 200 mg       Quantity: 21 capsule    Refills: 0     No discharge procedures on file      ED Provider  Electronically Signed by           Samantha Leblanc MD  05/15/18 8944

## 2020-06-03 NOTE — XMS
Encounter Summary
  Created on: 2020
 
 Isak Hilario
 External Reference #: [id number]
 : 30
 Sex: Male
 
 Demographics
 
 
+-----------------------+------------------------+
| Address               | [address]       |
|                       | [place]  [address]   |
+-----------------------+------------------------+
| Home Phone            | [phone]        |
+-----------------------+------------------------+
| Preferred Language    | Unknown                |
+-----------------------+------------------------+
| Marital Status        |                 |
+-----------------------+------------------------+
| Cheondoism Affiliation | NON                    |
+-----------------------+------------------------+
| Race                  | White                  |
+-----------------------+------------------------+
| Ethnic Group          | Not  or  |
+-----------------------+------------------------+
 
 
 Author
 
 
+--------------+------------------------------+
| Author       | [facility] |
+--------------+------------------------------+
| Organization | [facility] |
+--------------+------------------------------+
| Address      | Unknown                      |
+--------------+------------------------------+
| Phone        | Unavailable                  |
+--------------+------------------------------+
 
 
 
 Support
 
 
+-------------------+--------------+-------------------+-----------------+
| Name              | Relationship | Address           | Phone           |
+-------------------+--------------+-------------------+-----------------+
| Jackie Hilario | ECON         | [address]      | [phone] |
|                   |              | [place], [place]   |                 |
|                   |              | [address]             |                 |
+-------------------+--------------+-------------------+-----------------+
 
 
 
 Care Team Providers
 
 
 
+-----------------------+------+-------------+
| Care Team Member Name | Role | Phone       |
+-----------------------+------+-------------+
 PCP  | Unavailable |
+-----------------------+------+-------------+
 
 
 
 Encounter Details
 
 
+--------+-------------+----------------------+--------------------+-------------+
| Date   | Type        | Department           | Care Team          | Description |
+--------+-------------+----------------------+--------------------+-------------+
| / | Documentati |   Health Information |   Other, Faculty   |             |
| 2019   | on          |  Services  [address]    | [phone]       |             |
|        |             | [address]  |                    |             |
|        |             |  Mailcode: OP17A     |                    |             |
|        |             | [facility]  |                    |             |
|        |             | [place], [place]  |                    |             |
|        |             | [address]           |                    |             |
|        |             | [phone]         |                    |             |
+--------+-------------+----------------------+--------------------+-------------+
 
 
 
 Social History
 
 
+----------------+-------+-----------+--------+------+
| Tobacco Use    | Types | Packs/Day | Years  | Date |
|                |       |           | Used   |      |
+----------------+-------+-----------+--------+------+
| Never Assessed |       |           |        |      |
+----------------+-------+-----------+--------+------+
 
 
 
+------------------+---------------+
| Sex Assigned at  | Date Recorded |
| Birth            |               |
+------------------+---------------+
| Not on file      |               |
+------------------+---------------+
 
 
 
+----------------+-------------+-------------+
| Job Start Date | Occupation  | Industry    |
+----------------+-------------+-------------+
| Not on file    | Not on file | Not on file |
+----------------+-------------+-------------+
 
 
 
+----------------+--------------+------------+
| Travel History | Travel Start | Travel End |
+----------------+--------------+------------+
 
 
 
 
+-------------------------------------+
| No recent travel history available. |
+-------------------------------------+
 documented as of this encounter
 
 Plan of Treatment
 Not on filedocumented as of this encounter
 
 Visit Diagnoses
 Not on filedocumented in this encounter

## 2020-06-03 NOTE — XMS
Encounter Summary
  Created on: 2020
 
 Isak Hilario
 External Reference #: [id number]
 : 30
 Sex: Male
 
 Demographics
 
 
+-----------------------+----------------------+
| Address               | [address]         |
|                       | [place]  [address] |
+-----------------------+----------------------+
| Home Phone            | [phone]      |
+-----------------------+----------------------+
| Preferred Language    | Unknown              |
+-----------------------+----------------------+
| Marital Status        |               |
+-----------------------+----------------------+
| Nondenominational Affiliation | Unknown              |
+-----------------------+----------------------+
| Race                  | Unknown              |
+-----------------------+----------------------+
| Ethnic Group          | Unknown              |
+-----------------------+----------------------+
 
 
 Author
 
 
+--------------+--------------------------------------------+
| Author       | [facility]  |
|              | and [place]                                |
+--------------+--------------------------------------------+
| Organization | [facility]  |
|              | and [place]                                |
+--------------+--------------------------------------------+
| Address      | Unknown                                    |
+--------------+--------------------------------------------+
| Phone        | Unavailable                                |
+--------------+--------------------------------------------+
 
 
 
 Support
 
 
+-------------+--------------+---------+-----------------+
| Name        | Relationship | Address | Phone           |
+-------------+--------------+---------+-----------------+
| Alessandra Saxena | ECON         | Unknown | [phone] |
+-------------+--------------+---------+-----------------+
 
 
 
 Care Team Providers
 
 
 
+-----------------------+------+-----------------+
| Care Team Member Name | Role | Phone           |
+-----------------------+------+-----------------+
| Bari Ha MD | PCP  | [phone] |
+-----------------------+------+-----------------+
 
 
 
 Reason for Visit
 
 
+-----------------+----------+
| Reason          | Comments |
+-----------------+----------+
| Anticoagulation |          |
+-----------------+----------+
 
 
 
 Encounter Details
 
 
+--------+-----------+----------------------+----------------------+----------------------+
| Date   | Type      | Department           | Care Team            | Description          |
+--------+-----------+----------------------+----------------------+----------------------+
| / | Off-Site  |   [facility] |   Jason Braga,   | Atrial fibrillation, |
| 2020   | Visit     |  CLINIC  [address]   | ARNP  [address]   |  unspecified type    |
|        |           | Street  [place], | [place], [place]      | (HCC) (Primary Dx);  |
|        |           |  [place] [address]       | [address]  [phone]  | Monitoring for       |
|        |           | [phone]         |  [phone] (Fax)  | long-term            |
|        |           |                      |                      | anticoagulant use;   |
|        |           |                      |                      | Non-healing surgical |
|        |           |                      |                      |  wound, initial      |
|        |           |                      |                      | encounter            |
+--------+-----------+----------------------+----------------------+----------------------+
 
 
 
 Social History
 
 
+--------------+-------+-----------+--------+------+
| Tobacco Use  | Types | Packs/Day | Years  | Date |
|              |       |           | Used   |      |
+--------------+-------+-----------+--------+------+
| Never Smoker |       |           |        |      |
+--------------+-------+-----------+--------+------+
 
 
 
+---------------------+------+---+----------+
| Smokeless Tobacco:  | Chew |   | Quit:    |
| Former User         |      |   | 19 |
|                     |      |   | 85       |
+---------------------+------+---+----------+
 
 
 
 
+-------------+-------------+---------+----------+
| Alcohol Use | Drinks/Week | oz/Week | Comments |
+-------------+-------------+---------+----------+
| Not Asked   |             |         |          |
+-------------+-------------+---------+----------+
 
 
 
+------------------+---------------+
| Sex Assigned at  | Date Recorded |
| Birth            |               |
+------------------+---------------+
| Not on file      |               |
+------------------+---------------+
 
 
 
+----------------+-------------+-------------+
| Job Start Date | Occupation  | Industry    |
+----------------+-------------+-------------+
| Not on file    | Not on file | Not on file |
+----------------+-------------+-------------+
 
 
 
+----------------+--------------+------------+
| Travel History | Travel Start | Travel End |
+----------------+--------------+------------+
 
 
 
+-------------------------------------+
| No recent travel history available. |
+-------------------------------------+
 documented as of this encounter
 
 Last Filed Vital Signs
 
 
+-------------------+---------------------+----------------------+----------+
| Vital Sign        | Reading             | Time Taken           | Comments |
+-------------------+---------------------+----------------------+----------+
| Blood Pressure    | 127/67              | 2020  7:36 AM  |          |
|                   |                     | PDT                  |          |
+-------------------+---------------------+----------------------+----------+
| Pulse             | 64                  | 2020  7:36 AM  |          |
|                   |                     | PDT                  |          |
+-------------------+---------------------+----------------------+----------+
| Temperature       | 36.2   C (97.2   F) | 2020  7:36 AM  |          |
|                   |                     | PDT                  |          |
+-------------------+---------------------+----------------------+----------+
| Respiratory Rate  | 20                  | 2020  7:36 AM  |          |
|                   |                     | PDT                  |          |
+-------------------+---------------------+----------------------+----------+
| Oxygen Saturation | 96%                 | 2020  7:36 AM  |          |
|                   |                     | PDT                  |          |
+-------------------+---------------------+----------------------+----------+
| Inhaled Oxygen    | -                   | -                    |          |
| Concentration     |                     |                      |          |
+-------------------+---------------------+----------------------+----------+
 
| Weight            | -                   | -                    |          |
+-------------------+---------------------+----------------------+----------+
| Height            | -                   | -                    |          |
+-------------------+---------------------+----------------------+----------+
| Body Mass Index   | -                   | -                    |          |
+-------------------+---------------------+----------------------+----------+
 documented in this encounter
 
 Progress Notes
 Jason Braga ARNP - 2020  6:30 AM [facility] 
 
 Patient Name: Isak Hilario | Age: 89 y.o. | : 1930 | Medical Record Number:[id number]
[id number] | Author: HANNAH Turner | Date of Encounter: 3/23/2020
 
 Subjective:
 HPI:
 Anticoagulation: Patient here for followup of chronic anticoagulation.
 Indication: No diagnosis found.
 
 Patient's medications, allergies, past medical, surgical, social and family histories were 
obtained and reviewed as appropriate.
 
 ROS:
 See HPI
 
 Objective:
 There were no vitals taken for this visit.
 
 Physical Exam 
 Constitutional: He appears well-developed and well-nourished. 
 Cardiovascular: Normal rate and regular rhythm. 
 Pulmonary/Chest: Effort normal. He has no wheezes. He has no rales. 
 Musculoskeletal: Normal range of motion.    
    General: No tenderness, deformity or edema. 
 Neurological: He is alert. 
 Skin: Skin is warm and dry. 
 
 Assessment/Plan:
 There are no diagnoses linked to this encounter.
 See Anticoagulation Summary above.
 
 Continue current warfarin schedule. [id number]
 
 Seen also today for non-healing biopsy site on right temple.  Wound measures 0.7 x 1 x 0.1 
cm with dry non-granulating wound bed.  Has excessive tissue dryness with scarring.  Wound c
are order changed to applying hydrogel twice a day with no dressing to prevent further dryin
g out of the wound bed and irritation.
 
 Electronically signed by:
 HANNAH Turner
 3/23/2020 at 7:37 AM
 
 Portions of this chart may have been created with Dragon voice recognition software. Occasi
onal wrong-word or   sound-alike  substitutions may have occurred due to the inherent fuentes
itations of voice recognition software. Please read the chart carefully and recognize, using
 context, where these substitutions have occurred.Electronically signed by JULIA Joy at 2020  7:46 AM PDTdocumented in this encounter
 
 Plan of Treatment
 Not on filedocumented as of this encounter
 
 
 Procedures
 
 
+----------------+--------+-------------+----------------------+----------------------+
| Procedure Name | Priori | Date/Time   | Associated Diagnosis | Comments             |
|                | ty     |             |                      |                      |
+----------------+--------+-------------+----------------------+----------------------+
| POC PT/INR     | Routin | 2020  |   Atrial             |   Results for this   |
| FINGERSTICK    | e      |  6:50 AM    | fibrillation,        | procedure are in the |
|                |        | PDT         | unspecified type     |  results section.    |
|                |        |             | ([facility])  Monitoring    |                      |
|                |        |             | for long-term        |                      |
|                |        |             | anticoagulant use    |                      |
+----------------+--------+-------------+----------------------+----------------------+
 documented in this encounter
 
 Results
 POCT PT/INR fingerstick (2020  6:50 AM PDT)
 
+-----------+---------+-----------+------------+--------------+
| Component | Value   | Ref Range | Performed  | Pathologist  |
|           |         |           | At         | Signature    |
+-----------+---------+-----------+------------+--------------+
| INR, POC  | 3.2 (A) | 0.9 - 1.2 |            |              |
+-----------+---------+-----------+------------+--------------+
 
 
 
+----------+
| Specimen |
+----------+
| Blood    |
+----------+
 documented in this encounter
 
 Visit Diagnoses
 
 
+-----------------------------------------------------------------------------------------+
| Diagnosis                                                                               |
+-----------------------------------------------------------------------------------------+
|   Atrial fibrillation, unspecified type (HCC) - Primary                                 |
+-----------------------------------------------------------------------------------------+
|   Monitoring for long-term anticoagulant use  Encounter for long-term (current) use of  |
| anticoagulants                                                                          |
+-----------------------------------------------------------------------------------------+
|   Non-healing surgical wound, initial encounter                                         |
+-----------------------------------------------------------------------------------------+
 documented in this encounter

## 2020-06-03 NOTE — XMS
Clinical Summary
  Created on: 2020
 
 Isak Hilario
 : 30
 Sex: Male
 
 Demographics
 
 
+-----------------------+------------------------+
| Address               | [address]       |
|                       | [place]  [address]   |
+-----------------------+------------------------+
| Home Phone            | [phone]        |
+-----------------------+------------------------+
| Preferred Language    | Unknown                |
+-----------------------+------------------------+
| Marital Status        |                 |
+-----------------------+------------------------+
| Temple Affiliation | NON                    |
+-----------------------+------------------------+
| Race                  | White                  |
+-----------------------+------------------------+
| Ethnic Group          | Not  or  |
+-----------------------+------------------------+
 
 
 Author
 
 
+--------------+-------------+
| Organization | Unknown     |
+--------------+-------------+
| Address      | Unknown     |
+--------------+-------------+
| Phone        | Unavailable |
+--------------+-------------+
 
 
 
 Support
 
 
+-------------------+--------------+-------------------+-----------------+
| Name              | Relationship | Address           | Phone           |
+-------------------+--------------+-------------------+-----------------+
| Jackie Hilario | ECON         | [address]      | [phone] |
|                   |              | [place], [place]   |                 |
|                   |              | [id number]             |                 |
+-------------------+--------------+-------------------+-----------------+
 
 
 
 Care Team Providers
 
 
+-----------------------+------+-------------+
 
| Care Team Member Name | Role | Phone       |
+-----------------------+------+-------------+
 PCP  | Unavailable |
+-----------------------+------+-------------+
 
 
 
 Source Comments
 SAMMIE is fully live on both [facility] and [facility]
 
 Allergies
 No Known Allergies
 
 Medications
 Not on file
 
 Active Problems
 Not on file
 
 Social History
 
 
+----------------+-------+-----------+--------+------+
| Tobacco Use    | Types | Packs/Day | Years  | Date |
|                |       |           | Used   |      |
+----------------+-------+-----------+--------+------+
| Never Assessed |       |           |        |      |
+----------------+-------+-----------+--------+------+
 
 
 
+------------------+---------------+
| Sex Assigned at  | Date Recorded |
| Birth            |               |
+------------------+---------------+
| Not on file      |               |
+------------------+---------------+
 
 
 
+----------------+-------------+-------------+
| Job Start Date | Occupation  | Industry    |
+----------------+-------------+-------------+
| Not on file    | Not on file | Not on file |
+----------------+-------------+-------------+
 
 
 
+----------------+--------------+------------+
| Travel History | Travel Start | Travel End |
+----------------+--------------+------------+
 
 
 
+-------------------------------------+
| No recent travel history available. |
+-------------------------------------+
 
 
 
 
 Last Filed Vital Signs
 Not on file
 
 Plan of Treatment
 
 
+----------------------+-----------+-----------+----------+
| Health Maintenance   | Due Date  | Last Done | Comments |
+----------------------+-----------+-----------+----------+
| Pneumococcal         |  |           |          |
| vaccination (  | 5         |           |          |
| - PCV13)             |           |           |          |
+----------------------+-----------+-----------+----------+
| Influenza (Flu)      | 10/01/201 |           |          |
| vaccination (#1)     | 9         |           |          |
+----------------------+-----------+-----------+----------+
 
 
 
 Results
 Not on filefrom Last 3 Months

## 2020-06-03 NOTE — XMS
Encounter Summary
  Created on: 2020
 
 Isak Hilario
 External Reference #: [id number]
 : 30
 Sex: Male
 
 Demographics
 
 
+-----------------------+------------------------+
| Address               | [address]       |
|                       | [place]  [address]   |
+-----------------------+------------------------+
| Home Phone            | [phone]        |
+-----------------------+------------------------+
| Preferred Language    | Unknown                |
+-----------------------+------------------------+
| Marital Status        |                 |
+-----------------------+------------------------+
| Christianity Affiliation | NON                    |
+-----------------------+------------------------+
| Race                  | White                  |
+-----------------------+------------------------+
| Ethnic Group          | Not  or  |
+-----------------------+------------------------+
 
 
 Author
 
 
+--------------+------------------------------+
| Author       | [facility] |
+--------------+------------------------------+
| Organization | [facility] |
+--------------+------------------------------+
| Address      | Unknown                      |
+--------------+------------------------------+
| Phone        | Unavailable                  |
+--------------+------------------------------+
 
 
 
 Support
 
 
+-------------------+--------------+-------------------+-----------------+
| Name              | Relationship | Address           | Phone           |
+-------------------+--------------+-------------------+-----------------+
| Jackie Hilario | ECON         | [address]      | [phone] |
|                   |              | [place], [place]   |                 |
|                   |              | [address]             |                 |
+-------------------+--------------+-------------------+-----------------+
 
 
 
 Care Team Providers
 
 
 
+-----------------------+------+-------------+
| Care Team Member Name | Role | Phone       |
+-----------------------+------+-------------+
 PCP  | Unavailable |
+-----------------------+------+-------------+
 
 
 
 Encounter Details
 
 
+--------+-------------+---------------------+---------------------+---------------+
| Date   | Type        | Department          | Care Team           | Description   |
+--------+-------------+---------------------+---------------------+---------------+
| / | Office      |   General Internal  |   Note, Outpatient  | Progress Note |
|    | Visit-Trans | Medicine  [address]   | Clinic              |               |
|        | [place]      | [address]       |                     |               |
|        |             | Mailcode: L475      |                     |               |
|        |             | Outpatient Clinic   |                     |               |
|        |             | [facility] [address]      |                     |               |
|        |             | [place], [place]        |                     |               |
|        |             | [address]          |                     |               |
|        |             | [phone]        |                     |               |
+--------+-------------+---------------------+---------------------+---------------+
 
 
 
 Social History
 
 
+----------------+-------+-----------+--------+------+
| Tobacco Use    | Types | Packs/Day | Years  | Date |
|                |       |           | Used   |      |
+----------------+-------+-----------+--------+------+
| Never Assessed |       |           |        |      |
+----------------+-------+-----------+--------+------+
 
 
 
+------------------+---------------+
| Sex Assigned at  | Date Recorded |
| Birth            |               |
+------------------+---------------+
| Not on file      |               |
+------------------+---------------+
 
 
 
+----------------+-------------+-------------+
| Job Start Date | Occupation  | Industry    |
+----------------+-------------+-------------+
| Not on file    | Not on file | Not on file |
+----------------+-------------+-------------+
 
 
 
+----------------+--------------+------------+
| Travel History | Travel Start | Travel End |
 
+----------------+--------------+------------+
 
 
 
+-------------------------------------+
| No recent travel history available. |
+-------------------------------------+
 documented as of this encounter
 
 Progress Notes
 Interface, Transcription In - 2007  3:12 AM PST CLINIC DATE: 98
  
  UROLOGIC ONCOLOGY CLINIC
  
  The patient returns to undergo needle biopsy of the seminal vesicles after
  being seen previously this morning in consultation for high grade extensive
  adenocarcinoma of the prostate. After urinalysis showed no evidence of
  urinary tract infection, and the patient received an oral antibiotic
  consisting of 750 mg of Cipro, a transrectal ultrasound was performed. This
  demonstrated significant enlargement of the seminal vesicles but no evidence
  of tumor extension into the seminal vesicles or extension beyond the margins
  of the prostate. Biopsies were taken from the right seminal vesicle
  proximally in the area of its junction with the prostate and distally, with
  similar biopsies obtained from the left seminal vesicle. The patient
  tolerated the procedure well and was instructed to return for significant
  bleeding involving clots from the bladder or rectum, fever over 101 degrees,
  or any other significant problems. He was informed that full activity could
  be resumed within 24 hours and was to take another ciprofloxacin 750 mg
  tablet this evening. He will be informed of the results when they are
  available.
  
  
  
  [facility]
  Chief, Urologic Oncology
  , Surgery
  
  /[facility]
  D: 98
  T: 98
   Electronically signed by Interface, Transcription In at 2007  3:12 AM PSTdocumented
 in this encounter
 
 Plan of Treatment
 Not on filedocumented as of this encounter
 
 Visit Diagnoses
 Not on filedocumented in this encounter

## 2020-06-03 NOTE — XMS
Encounter Summary
  Created on: 2020
 
 Isak Hilario
 External Reference #: [id number]
 : 30
 Sex: Male
 
 Demographics
 
 
+-----------------------+----------------------+
| Address               | [address]         |
|                       | [place]  [address] |
+-----------------------+----------------------+
| Home Phone            | [phone]      |
+-----------------------+----------------------+
| Preferred Language    | Unknown              |
+-----------------------+----------------------+
| Marital Status        |               |
+-----------------------+----------------------+
| Druze Affiliation | Unknown              |
+-----------------------+----------------------+
| Race                  | Unknown              |
+-----------------------+----------------------+
| Ethnic Group          | Unknown              |
+-----------------------+----------------------+
 
 
 Author
 
 
+--------------+--------------------------------------------+
| Author       | [facility]  |
|              | and [place]                                |
+--------------+--------------------------------------------+
| Organization | [facility]  |
|              | and [place]                                |
+--------------+--------------------------------------------+
| Address      | Unknown                                    |
+--------------+--------------------------------------------+
| Phone        | Unavailable                                |
+--------------+--------------------------------------------+
 
 
 
 Support
 
 
+-------------+--------------+---------+-----------------+
| Name        | Relationship | Address | Phone           |
+-------------+--------------+---------+-----------------+
| Alessandra Saxena | ECON         | Unknown | [phone] |
+-------------+--------------+---------+-----------------+
 
 
 
 Care Team Providers
 
 
 
+-----------------------+------+-----------------+
| Care Team Member Name | Role | Phone           |
+-----------------------+------+-----------------+
| Bari Ha MD | PCP  | [phone] |
+-----------------------+------+-----------------+
 
 
 
 Reason for Visit
 
 
+-------------+----------+
| Reason      | Comments |
+-------------+----------+
| New Patient |          |
+-------------+----------+
 Evaluate & Treat
 
+------------+--------+-----------+--------------+--------------+---------------+
| Status     | Reason | Specialty | Diagnoses /  | Referred By  | Referred To   |
|            |        |           | Procedures   | Contact      | Contact       |
+------------+--------+-----------+--------------+--------------+---------------+
| Authorized |        | Plastic   |   Diagnoses  |   Tomasz,     |   Jimena,    |
|            |        | Surgery   |  Forehead    | Mendoza Dexter,  | Peter        |
|            |        |           | wound        | MD  [address]  | [facility] [address]    |
|            |        |           | Exposed bone | [address]  | [address]  |
|            |        |           |  after       |  [place],  |  [place], |
|            |        |           | resection of | OR           |  [place] [address]     |
|            |        |           |  basal cell  | [address]   | Phone:        |
|            |        |           | carcinoma    | Phone:       | [phone]  |
|            |        |           | Procedures   | [phone] |  Fax:         |
|            |        |           | [place] OFF[facility]    |   Fax:       | [phone]  |
|            |        |           | OUTPATIENT   | [phone] |               |
|            |        |           | NEW 60       |              |               |
|            |        |           | MINUTES      |              |               |
+------------+--------+-----------+--------------+--------------+---------------+
 
 
 
 
 Encounter Details
 
 
+--------+---------+----------------------+--------------------+----------------------+
| Date   | Type    | Department           | Care Team          | Description          |
+--------+---------+----------------------+--------------------+----------------------+
| / | Office  |   [facility]  |   Peter Hess  | Basal cell carcinoma |
| 2020   | Visit   | SURGERY  [address]   | [address]    |  (BCC) of scalp      |
|        |         | St  [place], [place]  | [address]  [place]    | (Primary Dx);        |
|        |         | [address]           | [facility] [place] [address]    | Hypertension,        |
|        |         | [phone]         | [phone]       | unspecified type     |
|        |         |                      | [phone] (Fax) |                      |
+--------+---------+----------------------+--------------------+----------------------+
 
 
 
 Social History
 
 
 
+--------------+-------+-----------+--------+------+
| Tobacco Use  | Types | Packs/Day | Years  | Date |
|              |       |           | Used   |      |
+--------------+-------+-----------+--------+------+
| Never Smoker |       |           |        |      |
+--------------+-------+-----------+--------+------+
 
 
 
+---------------------+------+---+----------+
| Smokeless Tobacco:  | Chew |   | Quit:    |
| Former User         |      |   | 19 |
|                     |      |   | 85       |
+---------------------+------+---+----------+
 
 
 
+-------------+-------------+---------+----------+
| Alcohol Use | Drinks/Week | oz/Week | Comments |
+-------------+-------------+---------+----------+
| Not Asked   |             |         |          |
+-------------+-------------+---------+----------+
 
 
 
+------------------+---------------+
| Sex Assigned at  | Date Recorded |
| Birth            |               |
+------------------+---------------+
| Not on file      |               |
+------------------+---------------+
 
 
 
+----------------+-------------+-------------+
| Job Start Date | Occupation  | Industry    |
+----------------+-------------+-------------+
| Not on file    | Not on file | Not on file |
+----------------+-------------+-------------+
 
 
 
+----------------+--------------+------------+
| Travel History | Travel Start | Travel End |
+----------------+--------------+------------+
 
 
 
+-------------------------------------+
| No recent travel history available. |
+-------------------------------------+
 documented as of this encounter
 
 Last Filed Vital Signs
 
 
+-------------------+----------------------+----------------------+----------+
| Vital Sign        | Reading              | Time Taken           | Comments |
+-------------------+----------------------+----------------------+----------+
 
| Blood Pressure    | 122/70               | 2020  1:05 PM  |          |
|                   |                      | PST                  |          |
+-------------------+----------------------+----------------------+----------+
| Pulse             | 70                   | 2020  1:05 PM  |          |
|                   |                      | PST                  |          |
+-------------------+----------------------+----------------------+----------+
| Temperature       | 36.9   C (98.4   F)  | 2020  1:05 PM  |          |
|                   |                      | PST                  |          |
+-------------------+----------------------+----------------------+----------+
| Respiratory Rate  | -                    | -                    |          |
+-------------------+----------------------+----------------------+----------+
| Oxygen Saturation | 97%                  | 2020  1:05 PM  |          |
|                   |                      | PST                  |          |
+-------------------+----------------------+----------------------+----------+
| Inhaled Oxygen    | -                    | -                    |          |
| Concentration     |                      |                      |          |
+-------------------+----------------------+----------------------+----------+
| Weight            | 91.9 kg (202 lb 9.6  | 2020  1:05 PM  |          |
|                   | oz)                  | PST                  |          |
+-------------------+----------------------+----------------------+----------+
| Height            | 170.2 cm (5' 7")     | 2020  1:05 PM  |          |
|                   |                      | PST                  |          |
+-------------------+----------------------+----------------------+----------+
| Body Mass Index   | 31.73                | 2020  1:05 PM  |          |
|                   |                      | PST                  |          |
+-------------------+----------------------+----------------------+----------+
 documented in this encounter
 
 Patient Instructions
 Patient Instructions Kourtney Espana RN - 2020  1:00 PM PST
 Take no Aspirin containing products for 7 days prior to surgery on 2020.
 Hold your Spironolactone on the morning of surgery.
 
 I will check with your provider regarding stopping your warfarin prior to surgery. I will c
ontact you with recommendations.
 
 DO NOT EAT OR DRINK ANYTHING EIGHT HOURS before your surgery.  This means no coffee, water,
 juice or toast on the morning of your surgery.  The only exception is essential medicines w
ith a small sip of water (or just enough to get the pills down safely).
 
 I have faxed the orders for your pre-op testing to [facility]. Please go by the
 hospital about one week prior to surgery and have the testing completed. 
 
 On 2020 check in at Same Day Surgery ([address]) at 11:15 am.
 
 Your surgery is called Scalp wound debridement with full-thickness skin graft repair.
 It will start about 1:15 pm and will finish about 2:45 pm.
 You will be ready to go home Same Day.
 
 Anesthesia type recommended: Choice
 
 General IV Sedation with local anesthesia (MAC) IV Sedation Other
 
 MAKE ARRANGEMENTS FOR A RESPONSIBLE ADULT TO DRIVE YOU HOME.
 
 If you have any questions, please call at [phone].  
 Your nurse's name is Kourtney WESLEY.
 Your surgeon's name is Dr. Hess.
 
 I look forward to your having a safe, successful and comfortable surgery.
 
 
 Sign up for [employer] if you want easy access to your medical information online.  
 You can:
 Review your medications, immunizations, allergies and medical history.
 View details of your past and upcoming appointments.
 
 Sign up for [employer] if you want easy access to your medical information online.  Only you, 
your doctor and your health care team are permitted to view the information sent through [employer].  
 
 Through [employer] you can:
 ? Review your medications, immunizations, allergies and medical history.
 ? View details of your past and upcoming appointments.
 ? Receive test results online    no waiting for a phone call or letter
 ? Review health education topics and discharge instructions provided by your physician.
 ? Send secure emails to your healthcare team.
 ? Request renewals of your medications online
 ? Link your family  s accounts to yours for convenient access to appointments, immunizatio
n records, growth charts and more.  
 
 Below are the different ways you can sign up for [employer]:
 ? The first way is to get online at: [email or web address] and sign up directly throug
h the website prior to your appointment with us.  You can call [phone] ([phone]
[phone] if you have any questions or need assistance.  
 ? The second way is through the [employer] nieves which can be accessed with any smart phone.  Ju
st go to your nieves store and look up [employer]. [email or web address].
 ? The third way is to do it while you wait in the room for the doctor at your appointment. 
 The nurse is available if you have any questions or need assistance.
 
 You will need the following information to sign up:
 Email address: _________________________________________________________________
 User name: ____________________________________
 Password: _____________________________________
 Password must be at least 8 characters long, less than 20 characters, and it must have at l
east 1 upper case letter and 1 lower case letter as well as at least 1 number.
 Electronically signed by Kourtney Espana RN at 2020  1:55 PM PST
 documented in this encounter
 
 Progress Notes
 Peter Hess MD - 2020  1:00 PM PSTFormatting of this note might be differ
ent from the original.
  [facility]
 ADMIT HISTORY AND PHYSICAL
 
 Primary Care Physician:  Bari Ha
 
 PATIENT NAME: Isak Hilario     : 1930   
 
 TODAY'S DATE: 2020                  MRN: [id number]
 ________________________________________________________________________
 
 CHIEF COMPLAINT:  New Patient
  
 
 REASON FOR CONSULTATION:  Forehead wound Exposed bone after resection of basal cell carcino
ma
 
 History OF PRESENT ILLNESS: I was asked by Dr. Tolbert to consult on this patient for Plastic
 surgery.
 
 
 Isak Hilario is a 89 y.o. male with Forehead wound Exposed bone after resection of basal c
ell carcinoma.
 
 Patient had a large basel cell carcinoma on the right upper forehead and frontal scalp. Les
ion was excised 2019 with and  repaired with a bilateral advance flap. The flap crispin
led and now has a large defect with exposed bone. He is here to see about possible skin loc
ting.  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 
 Current dressing include Aquafore ointment, povidone iodine ointment, gauze pad, and padded
 bandage. Bandage is changed every few days. Denies noticeable pain, only when being cleaned
.  Patient is on a very low dose Warfarin  5 days a week for chronic A fib. 
 Patients daughters would like to know if the area would close on its own or if a skin graft
 is needed. 
 
 Opioid Risk Tool (ORT):  Total Score  0 (20 9995)
 (0 to 3 = Low risk: 6% chance of developing problematic behaviors, 4 to 7 = Moderate risk: 
28% chance of developing problematic behaviors, 8 or more = High risk: 90% chance of develop
ing problematic behaviors.)
 
 PAST MEDICAL HISTORY
 Past Medical History: 
 Diagnosis Date 
   Atrial fibrillation (HCC)  
   Congestive heart failure (CHF) (HCC)  
   Gout  
   Hyperlipidemia  
   Hypertension  
   Hypothyroidism  
   Impaired fasting glucose  
   Peripheral neuropathy  
   Prostate cancer (HCC)  
   Venous insufficiency  
   Vitamin D deficiency  
 
 PAST SURGICAL HISTORY
 Past Surgical History: 
 Procedure Laterality Date 
   CARPAL TUNNEL RELEASE   
  Bilateral Wrists 
   Cataract Right 2013 
   COLONOSCOPY   
   KNEE SURGERY Right 1965 
   PROSTATECTOMY   
 
 CURRENT MEDICATIONS
 Current Outpatient Medications 
 Medication Sig Dispense Refill 
   cholecalciferol (VITAMIN D-3) 400 UNITS TABS Take 400 Units by mouth Daily.   
   cyanocobalamin (VITAMIN B-12) 500 mcg tablet Take 500 mcg by mouth Daily.   
   dilTIAZem (CARDIZEM CD) 360 MG 24 hr capsule Take 360 mg by mouth Daily.   
   levothyroxine (SYNTHROID) 75 mcg tablet Take 75 mcg by mouth Daily.   
   spironolactone (ALDACTONE) 25 mg tablet Take 25 mg by mouth Daily.   
   warfarin (COUMADIN) 1 mg tablet Take 1 mg by mouth Daily.   
 
 No current facility-administered medications for this visit.  
 
 ALLERGIES
 No Known Allergies
 
 FAMILY HISTORY
 There is no known family history of premature coronary artery disease, CVA nor diabetes.
 
 
 SOCIAL HISTORY
 The pt  reports that he has never smoked. He quit smokeless tobacco use about 35 years ago.
  His smokeless tobacco use included chew.
 
 REVIEW OF SYSTEMS
 Review of Systems 
 Constitutional: Negative.  
 HENT: Negative.  
 Eyes: Negative.  
 Respiratory: Negative.  
 Cardiovascular: Negative.  
 Gastrointestinal: Negative.  
 Genitourinary: Negative.  
 Musculoskeletal: Negative.  
 Skin: Negative.  
 Neurological: Negative.  
 Endo/Heme/Allergies: Negative.  
 Psychiatric/Behavioral: Positive for memory loss. 
      Some memory loss 
  
 
 PHYSICAL EXAM
 /70  | Pulse 70  | Temp 36.9 C (98.4 F) (Temporal)  | Ht 1.702 m (5' 7")  | Wt 91
.9 kg (202 lb 9.6 oz)  | SpO2 97%  | BMI 31.73 kg/m 
 Wt. Admission: Weight: 91.9 kg (202 lb 9.6 oz)
 
 Physical Exam
 Constitutional:  
    Appearance: Normal appearance. 
 HENT: 
    Head: Normocephalic. 
 Eyes: 
    Extraocular Movements: Extraocular movements intact. 
    Pupils: Pupils are equal, round, and reactive to light. 
 Neck: 
    Musculoskeletal: Normal range of motion and neck supple. 
 Pulmonary: 
    Effort: Pulmonary effort is normal. 
    Breath sounds: Normal breath sounds. 
 Musculoskeletal: Normal range of motion. 
 Skin:
    General: Skin is warm and dry. 
    Comments: 6 x 3 cm wound, geographical shape, R anterior scalp and onto forehead.  1 x 2
 cm area of exposed dessicated bone in the mid portion.  Granulating tissue around the harman
n.  Contracted rolled skin margins.  Extensive actinic and seborrheic skin changes on the sc
alp.  The surrounding scalp is stiff and immobile. 
 Neurological: 
    General: No focal deficit present. 
    Mental Status: He is alert and oriented to person, place, and time. 
 Psychiatric:    
    Mood and Affect: Mood normal.    
    Behavior: Behavior normal. 
 
 ASSESSMENT & PLAN:  
 1. Basal cell carcinoma (BCC) of scalp
 
 Right anterior scalp and forehead wound secondary to BCC excision.  Mostly granulating tiss
ue but there is a limited area of exposed desiccated calvarial bone.  It would be difficult 
for the patient to perform a large flap procedure, but we may be able to skin graft the woun
 
d.  The exposed calvarium may be small enough that we can remove the outer table to expose t
he diploe and graft directly.  Or if it is felt to be too large at the time of surgery, the 
diploe can be covered with Integra for secondary closure or delayed grafting after revascula
rization.   
 The planned procedure, incision and resultant scar, graft donor site, risks and complicatio
ns, post op care, and possible need for additional surgery, was discussed.  He will need to 
hold coumadin prior to surgery.
 
 Electronically Signed by:  Peter Hess MD
 
 CC: Bari Ha MD, Mendoza Tolbert MD
 
 Electronically signed by Peter Hess MD at 2020  2:30 PM PSTdocumented in 
this encounter
 
 Plan of Treatment
 
 
+------------------+------+--------+----------------------+----------------------+
| Name             | Type | Priori | Associated Diagnoses | Order Schedule       |
|                  |      | ty     |                      |                      |
+------------------+------+--------+----------------------+----------------------+
| Basic Metabolic  | Lab  | Routin |   Basal cell         | 1 Occurrences        |
| Panel            |      | e      | carcinoma (BCC) of   | starting 2020  |
|                  |      |        | scalp                | until 2021     |
+------------------+------+--------+----------------------+----------------------+
| CBC with         | Lab  | Routin |   Basal cell         | 1 Occurrences        |
| Differential     |      | e      | carcinoma (BCC) of   | starting 2020  |
|                  |      |        | scalp                | until 2021     |
+------------------+------+--------+----------------------+----------------------+
| ECG 12 lead      | ECG  | Routin |   Hypertension,      | 1 Occurrences        |
|                  |      | e      | unspecified type     | starting 2020  |
|                  |      |        | Basal cell carcinoma | until 2021     |
|                  |      |        |  (BCC) of scalp      |                      |
+------------------+------+--------+----------------------+----------------------+
 documented as of this encounter
 
 Visit Diagnoses
 
 
+-------------------------------------------------+
| Diagnosis                                       |
+-------------------------------------------------+
|   Basal cell carcinoma (BCC) of scalp - Primary |
+-------------------------------------------------+
|   Hypertension, unspecified type                |
+-------------------------------------------------+
 documented in this encounter

## 2020-06-03 NOTE — XMS
Encounter Summary
  Created on: 2020
 
 Isak Hilario
 External Reference #: [id number]
 : 30
 Sex: Male
 
 Demographics
 
 
+-----------------------+----------------------+
| Address               | [address]         |
|                       | [place]  [address] |
+-----------------------+----------------------+
| Home Phone            | [phone]      |
+-----------------------+----------------------+
| Preferred Language    | Unknown              |
+-----------------------+----------------------+
| Marital Status        |               |
+-----------------------+----------------------+
| Mandaeism Affiliation | Unknown              |
+-----------------------+----------------------+
| Race                  | Unknown              |
+-----------------------+----------------------+
| Ethnic Group          | Unknown              |
+-----------------------+----------------------+
 
 
 Author
 
 
+--------------+--------------------------------------------+
| Author       | [facility] [place]  |
|              | and [place]                                |
+--------------+--------------------------------------------+
| Organization | [facility]  |
|              | and [place]                                |
+--------------+--------------------------------------------+
| Address      | Unknown                                    |
+--------------+--------------------------------------------+
| Phone        | Unavailable                                |
+--------------+--------------------------------------------+
 
 
 
 Support
 
 
+-------------+--------------+---------+-----------------+
| Name        | Relationship | Address | Phone           |
+-------------+--------------+---------+-----------------+
| Alessandra Saxena | ECON         | Unknown | [phone] |
+-------------+--------------+---------+-----------------+
 
 
 
 Care Team Providers
 
 
 
+-----------------------+------+-------------+
| Care Team Member Name | Role | Phone       |
+-----------------------+------+-------------+
 PCP  | Unavailable |
+-----------------------+------+-------------+
 
 
 
 Encounter Details
 
 
+--------+----------+----------------------+----------------------+-------------+
| Date   | Type     | Department           | Care Team            | Description |
+--------+----------+----------------------+----------------------+-------------+
| / | Abstract |   [facility]  |   [facility] D[facility] |             |
|    |          | Conversion Location  |  SR                  |             |
|        |          |  [address] [facility]         |                      |             |
|        |          | [place], [place]         |                      |             |
|        |          | [address]           |                      |             |
|        |          | [phone]         |                      |             |
+--------+----------+----------------------+----------------------+-------------+
 
 
 
 Social History
 
 
+----------------+-------+-----------+--------+------+
| Tobacco Use    | Types | Packs/Day | Years  | Date |
|                |       |           | Used   |      |
+----------------+-------+-----------+--------+------+
| Never Assessed |       |           |        |      |
+----------------+-------+-----------+--------+------+
 
 
 
+------------------+---------------+
| Sex Assigned at  | Date Recorded |
| Birth            |               |
+------------------+---------------+
| Not on file      |               |
+------------------+---------------+
 
 
 
+----------------+-------------+-------------+
| Job Start Date | Occupation  | Industry    |
+----------------+-------------+-------------+
| Not on file    | Not on file | Not on file |
+----------------+-------------+-------------+
 
 
 
+----------------+--------------+------------+
| Travel History | Travel Start | Travel End |
+----------------+--------------+------------+
 
 
 
 
+-------------------------------------+
| No recent travel history available. |
+-------------------------------------+
 documented as of this encounter
 
 Last Filed Vital Signs
 
 
+-------------------+---------------------+----------------------+----------+
| Vital Sign        | Reading             | Time Taken           | Comments |
+-------------------+---------------------+----------------------+----------+
| Blood Pressure    | 128/82              | 07/10/2012 12:00 AM  |          |
|                   |                     | PDT                  |          |
+-------------------+---------------------+----------------------+----------+
| Pulse             | -                   | -                    |          |
+-------------------+---------------------+----------------------+----------+
| Temperature       | -                   | -                    |          |
+-------------------+---------------------+----------------------+----------+
| Respiratory Rate  | -                   | -                    |          |
+-------------------+---------------------+----------------------+----------+
| Oxygen Saturation | -                   | -                    |          |
+-------------------+---------------------+----------------------+----------+
| Inhaled Oxygen    | -                   | -                    |          |
| Concentration     |                     |                      |          |
+-------------------+---------------------+----------------------+----------+
| Weight            | 95.3 kg (210 lb)    | 2012 12:00 AM  |          |
|                   |                     | PDT                  |          |
+-------------------+---------------------+----------------------+----------+
| Height            | 165.7 cm (5' 5.25") | 10/29/2010 12:00 AM  |          |
|                   |                     | PDT                  |          |
+-------------------+---------------------+----------------------+----------+
| Body Mass Index   | 34.68               | 10/29/2010 12:00 AM  |          |
|                   |                     | PDT                  |          |
+-------------------+---------------------+----------------------+----------+
 documented in this encounter
 
 Plan of Treatment
 Not on filedocumented as of this encounter
 
 Visit Diagnoses
 Not on filedocumented in this encounter

## 2020-06-03 NOTE — XMS
Encounter Summary
  Created on: 2020
 
 Isak Hilario
 External Reference #: [id number]
 : 30
 Sex: Male
 
 Demographics
 
 
+-----------------------+----------------------+
| Address               | [address]         |
|                       | [place]  [address] |
+-----------------------+----------------------+
| Home Phone            | [phone]      |
+-----------------------+----------------------+
| Preferred Language    | Unknown              |
+-----------------------+----------------------+
| Marital Status        |               |
+-----------------------+----------------------+
| Confucianist Affiliation | Unknown              |
+-----------------------+----------------------+
| Race                  | Unknown              |
+-----------------------+----------------------+
| Ethnic Group          | Unknown              |
+-----------------------+----------------------+
 
 
 Author
 
 
+--------------+--------------------------------------------+
| Author       | Located within [facility] [place]  |
|              | and [place]                                |
+--------------+--------------------------------------------+
| Organization | Located within [facility] [place]  |
|              | and [place]                                |
+--------------+--------------------------------------------+
| Address      | Unknown                                    |
+--------------+--------------------------------------------+
| Phone        | Unavailable                                |
+--------------+--------------------------------------------+
 
 
 
 Support
 
 
+-------------+--------------+---------+-----------------+
| Name        | Relationship | Address | Phone           |
+-------------+--------------+---------+-----------------+
| Alessandra Saxena | ECON         | Unknown | [phone] |
+-------------+--------------+---------+-----------------+
 
 
 
 Care Team Providers
 
 
 
+-----------------------+------+-----------------+
| Care Team Member Name | Role | Phone           |
+-----------------------+------+-----------------+
| Bari Ha MD | PCP  | [phone] |
+-----------------------+------+-----------------+
 
 
 
 Reason for Visit
 
 
+----------------------+----------+
| Reason               | Comments |
+----------------------+----------+
| Coordination Of Care |          |
+----------------------+----------+
 
 
 
 Encounter Details
 
 
+--------+-----------+----------------------+--------------------+----------------------+
| Date   | Type      | Department           | Care Team          | Description          |
+--------+-----------+----------------------+--------------------+----------------------+
| / | Telephone |   [facility]  |   Pteer Hess  | Coordination Of Care |
| 2020   |           | SURGERY  [address]   | [address]    |                      |
|        |           | [place]  [place], [place]  | [address]  [facility]    |                      |
|        |           | [address]           | [place] [address]    |                      |
|        |           | [phone]         | [phone]       |                      |
|        |           |                      | [phone] (Fax) |                      |
+--------+-----------+----------------------+--------------------+----------------------+
 
 
 
 Social History
 
 
+--------------+-------+-----------+--------+------+
| Tobacco Use  | Types | Packs/Day | Years  | Date |
|              |       |           | Used   |      |
+--------------+-------+-----------+--------+------+
| Never Smoker |       |           |        |      |
+--------------+-------+-----------+--------+------+
 
 
 
+---------------------+------+---+----------+
| Smokeless Tobacco:  | Chew |   | Quit:    |
| Former User         |      |   | 19 |
|                     |      |   | 85       |
+---------------------+------+---+----------+
 
 
 
+-------------+-------------+---------+----------+
| Alcohol Use | Drinks/Week | oz/Week | Comments |
+-------------+-------------+---------+----------+
 
| Not Asked   |             |         |          |
+-------------+-------------+---------+----------+
 
 
 
+------------------+---------------+
| Sex Assigned at  | Date Recorded |
| Birth            |               |
+------------------+---------------+
| Not on file      |               |
+------------------+---------------+
 
 
 
+----------------+-------------+-------------+
| Job Start Date | Occupation  | Industry    |
+----------------+-------------+-------------+
| Not on file    | Not on file | Not on file |
+----------------+-------------+-------------+
 
 
 
+----------------+--------------+------------+
| Travel History | Travel Start | Travel End |
+----------------+--------------+------------+
 
 
 
+-------------------------------------+
| No recent travel history available. |
+-------------------------------------+
 documented as of this encounter
 
 Plan of Treatment
 Not on filedocumented as of this encounter
 
 Visit Diagnoses
 Not on filedocumented in this encounter

## 2020-06-03 NOTE — XMS
Encounter Summary
  Created on: 2020
 
 Isak Hilario
 External Reference #: [id number]
 : 30
 Sex: Male
 
 Demographics
 
 
+-----------------------+------------------------+
| Address               | [address]       |
|                       | [place]  [address]   |
+-----------------------+------------------------+
| Home Phone            | [phone]        |
+-----------------------+------------------------+
| Preferred Language    | Unknown                |
+-----------------------+------------------------+
| Marital Status        |                 |
+-----------------------+------------------------+
| Yazdanism Affiliation | NON                    |
+-----------------------+------------------------+
| Race                  | White                  |
+-----------------------+------------------------+
| Ethnic Group          | Not  or  |
+-----------------------+------------------------+
 
 
 Author
 
 
+--------------+------------------------------+
| Author       | [facility] |
+--------------+------------------------------+
| Organization | [facility] |
+--------------+------------------------------+
| Address      | Unknown                      |
+--------------+------------------------------+
| Phone        | Unavailable                  |
+--------------+------------------------------+
 
 
 
 Support
 
 
+-------------------+--------------+-------------------+-----------------+
| Name              | Relationship | Address           | Phone           |
+-------------------+--------------+-------------------+-----------------+
| Jackie Hilario | ECON         | [address]      | [phone] |
|                   |              | [place], [place]   |                 |
|                   |              | [address]             |                 |
+-------------------+--------------+-------------------+-----------------+
 
 
 
 Care Team Providers
 
 
 
+-----------------------+------+-------------+
| Care Team Member Name | Role | Phone       |
+-----------------------+------+-------------+
 PCP  | Unavailable |
+-----------------------+------+-------------+
 
 
 
 Encounter Details
 
 
+--------+-------------+----------------------+---------------------+------------------+
| Date   | Type        | Department           | Care Team           | Description      |
+--------+-------------+----------------------+---------------------+------------------+
| / | Procedure - |   [facility]   |   Record, Operation | Operative Report |
|    |             | [facility]  [address] |                     |                  |
|        | Transcribed |  [address]          |                     |                  |
|        |             | Mailcode:  [facility]    |                     |                  |
|        |             | [facility]       |                     |                  |
|        |             | [facility], [address]  |                     |                  |
|        |             |  [place], [place]        |                     |                  |
|        |             | [address]           |                     |                  |
|        |             | [phone]         |                     |                  |
+--------+-------------+----------------------+---------------------+------------------+
 
 
 
 Social History
 
 
+----------------+-------+-----------+--------+------+
| Tobacco Use    | Types | Packs/Day | Years  | Date |
|                |       |           | Used   |      |
+----------------+-------+-----------+--------+------+
| Never Assessed |       |           |        |      |
+----------------+-------+-----------+--------+------+
 
 
 
+------------------+---------------+
| Sex Assigned at  | Date Recorded |
| Birth            |               |
+------------------+---------------+
| Not on file      |               |
+------------------+---------------+
 
 
 
+----------------+-------------+-------------+
| Job Start Date | Occupation  | Industry    |
+----------------+-------------+-------------+
| Not on file    | Not on file | Not on file |
+----------------+-------------+-------------+
 
 
 
+----------------+--------------+------------+
| Travel History | Travel Start | Travel End |
 
+----------------+--------------+------------+
 
 
 
+-------------------------------------+
| No recent travel history available. |
+-------------------------------------+
 documented as of this encounter
 
 Plan of Treatment
 Not on filedocumented as of this encounter
 
 Procedures
 
 
+------------------+--------+-------------+----------------------+----------------------+
| Procedure Name   | Priori | Date/Time   | Associated Diagnosis | Comments             |
|                  | ty     |             |                      |                      |
+------------------+--------+-------------+----------------------+----------------------+
| OPERATION RECORD |        | 1998  |                      |   Results for this   |
|                  |        | 12:00 AM    |                      | procedure are in the |
|                  |        | PDT         |                      |  results section.    |
+------------------+--------+-------------+----------------------+----------------------+
 documented in this encounter
 
 Results
 OPERATION RECORD (1998 12:00 AM PDT)
 
+-------------------------------------------------------------------------------+
| Procedure Note                                                                |
+-------------------------------------------------------------------------------+
|   1998 12:00 AM PDT    [place]                                           |
|   [facility]                                                  |
|  [address], [place] [address] [phone]   |
|   [facility]                                            |
|                                                                               |
|  OPERATION RECORD                                                             |
|                                                                               |
|  Med Rec No.: [id number] Date: 98                                      |
|                                                                               |
|  Name: Isak Hilario                                                       |
|                                                                               |
|                                                                               |
|  ATTENDING SURGEON:                                                           |
|  [facility]                                                       |
|  Chief, Urologic Oncology                                                     |
|  , Surgery                                                 |
|  ASSISTANT(S):                                                                |
|  Hawk Garrison M.D.                                                       |
|  Resident, Urology                                                            |
|  PREOPERATIVE DIAGNOSIS: Stage T2b adenocarcinoma of the prostate.            |
|                                                                               |
|  POSTOPERATIVE DIAGNOSIS(ES): The same.                                       |
|                                                                               |
|  OPERATION(S) PERFORMED: 1. Bilateral pelvic lymph node dissection.           |
|   2. Radical retropubic prostatectomy.                                        |
|                                                                               |
|  ANESTHESIA: General.                                                         |
|                                                                               |
|  SPECIMEN(S) REMOVED: 1. Right and left pelvic lymph nodes.                   |
 
|   2. Prostate and seminal vesicles.                                           |
|                                                                               |
|  INDICATIONS: This is a 67-year-old man who was diagnosed                     |
|   with [place]'s 3+/4 adenocarcinoma of the                                   |
|   prostate in five of                                                         |
|  six sections of biopsies. He was diagnosed based on a nodule felt on rectal  |
|  examination, as well as a PSA of approximate 20. Repeat biopsies of seminal  |
|  vesicles revealed no evidence of extra prostatic disease. He was counseled   |
|  regarding his options and elected surgery. He was brought to the Operating   |
|  Room now for a radical prostatectomy.                                        |
|                                                                               |
|  FINDINGS: Bilateral pelvic lymph node dissections were                       |
|   performed, and no significant adenopathy was                                |
|   encountered. Frozen                                                         |
|  sections were performed by the pathologist, and no evidence of metastatic    |
|  tumor was seen.                                                              |
|                                                                               |
|  The prostate had no obvious extra prostatic disease or obvious extracapsular |
|  extension. No nerve sparing procedure was attempted. The bladder neck was    |
|  spared. Estimated blood loss was 450 ml. Total operative time was            |
|  approximately three hours.                                                   |
|                                                                               |
|  PROCEDURE: The patient was taken to the Operating Room,                      |
|   identified, and general anesthesia induced.                                 |
|   He was placed in the                                                        |
|  supine position and his abdomen and genitalia prepped and draped in the      |
|  usual sterile fashion. An 18 [place] Soto catheter was placed                |
|  transurethrally into the bladder. A midline incision was made, extending     |
|  from just below the umbilicus down to the level of the pubis, carried down   |
|  through the subcutaneous tissues, and the fascia was incised in the midline. |
|  The retropubic space was bluntly developed, exposing the anterior wall to    |
|  bladder, the prostate, and external iliac vessels. A self-retaining          |
|  retractor was then placed and, beginning with the left side, bilateral       |
|  pelvic lymph node dissections were performed. Margins of lymph node          |
|  dissection included the anterior border of the external iliac vein           |
|  anteriorly, the obturator nerve posteriorly, the bifurcation of the iliac    |
|  vessels superiorly, and Bimal's ligament inferiorly. All lymphatic tissue   |
|  within these borders was removed, and hemoclips were placed across all       |
|  lymphatic vessels prior to dividing them. Once the two bilateral lymph node  |
|  dissections were performed, the self-retaining retractor was repositioned    |
|  and the fat overlying the anterior portion of the prostate was excised,      |
|  using electrocautery. This exposed the endopelvic fascia.                    |
|                                                                               |
|  Once frozen sections were completed and they were read as having no evidence |
|  of metastatic disease, the endopelvic fascia was opened bilaterally, using   |
|  electrocautery. The levator ani muscles were then bluntly pushed laterally   |
|  off the lateral aspect of the prostate. The dorsal vein complex, including   |
|  the lateral edge of the incised endopelvic fascia, was then gathered in a    |
|  pair of [place] clamps and ligated proximally and distally with 0 chromic.   |
|  The dorsal vein was then divided and sharp dissection was used to dissect    |
|  the underlying apex of the prostate off of the dorsal vein complex. There    |
|  was some bleeding encountered from the dorsal vein complex at this point,    |
|  and it was oversewn with a 3-0 Biosin suture. The anterior wall of the       |
|  urethra was then divided and the Soto catheter was brought up through the   |
|  defect of the anterior wall of the urethra, clamped, and divided. It was     |
|  left in the bladder to use as a retractor through the remainder of the case. |
|  The posterior wall of the urethra was also sharply divided and the lateral   |
|  pillars on either side of the urethra were clipped and then divided. The     |
|  lateral pedicles to the prostate were then divided, with hemoclips being in  |
|  place across them, near the apex, and 0 silk sutures near the base. The 0    |
 
|  silk sutures were placed across the main pedicle of the prostate and tied    |
|  prior to dividing it. This allowed exposure of the lateral aspect of the     |
|  seminal vesicles on both sides, and an umbilical tape was passed between the |
|  seminal vessels and the bladder neck. The bladder neck was then dissected    |
|  off the prostate, using electrocautery. There was a small aspect of median   |
|  lobe prostate tissue, which was dissected out of the posterior bladder neck. |
|  The seminal vesicles were then dissected free of surrounding tissues, using  |
|  hemoclips to clip all vessels, and the ampullae of the vas were divided      |
|  after placing large hemoclips across them on both sides. The specimen was    |
|  then removed from the field. The epithelium of the bladder neck was          |
|  advanced, using a series of interrupted 3-0 chromic. The field was           |
|  inspected and no obvious bleeding was noted. A _____________ was placed      |
|                                                                               |
|  into the urethra and a series of anastomotic sutures were placed in the      |
|  urethral stump at 2 o'clock, 4 o'clock, 6 o'clock, 8 o'clock, and 10 o'clock |
|  positions. These were 0 chromic sutures. Sutures were then placed in         |
|  corresponding positions on the bladder neck, and a #20 [place] Soto catheter |
|  was placed through the urethra and into the bladder.                         |
|  The table was then flattened and the bladder neck brought into approximation |
|  with the urethral stump and the sutures tied into position. The wound as     |
|  irrigated. A 10 mm flat Kevin-Mendieta drain was placed adjacent to the       |
|  anastomosis and brought out through a separate stab wound to the left of the |
|  incision. It was secured to the skin with a 2-0 nylon. The fascia was closed |
|  with a running #1 Biosin, the subcutaneous tissue closed with a running 3-0  |
|  Vicryl, and the skin closed with a running 4-0 Vicryl subcuticular stitch.   |
|  Sterile dressings were applied. The patient tolerated the procedure well,    |
|  was extubated, and returned to the Post Anesthesia Care Unit in stable       |
|  condition.                                                                   |
|                                                                               |
|  Dr. Hernandez was present and actively participated in the entire procedure.      |
|                                                                               |
|  Hawk Garrison M.D.                                                       |
|  Resident, Urology                                                            |
|  [facility]                                                       |
|  Chief, Urologic Oncology                                                     |
|  , Surgery                                                 |
|  [facility]                                                                      |
|  D: 98                                                                  |
|  T: 98 7:51 A                                                           |
|                                                                               |
|                                                                               |
+-------------------------------------------------------------------------------+
 documented in this encounter
 
 Visit Diagnoses
 Not on filedocumented in this encounter

## 2020-06-03 NOTE — XMS
Encounter Summary
  Created on: 2020
 
 Isak Hilario
 External Reference #: [id number]
 : 30
 Sex: Male
 
 Demographics
 
 
+-----------------------+------------------------+
| Address               | [address]       |
|                       | [place]  [address]   |
+-----------------------+------------------------+
| Home Phone            | [phone]        |
+-----------------------+------------------------+
| Preferred Language    | Unknown                |
+-----------------------+------------------------+
| Marital Status        |                 |
+-----------------------+------------------------+
| Mandaeism Affiliation | NON                    |
+-----------------------+------------------------+
| Race                  | White                  |
+-----------------------+------------------------+
| Ethnic Group          | Not  or  |
+-----------------------+------------------------+
 
 
 Author
 
 
+--------------+------------------------------+
| Author       | [facility] |
+--------------+------------------------------+
| Organization | [facility] |
+--------------+------------------------------+
| Address      | Unknown                      |
+--------------+------------------------------+
| Phone        | Unavailable                  |
+--------------+------------------------------+
 
 
 
 Support
 
 
+-------------------+--------------+-------------------+-----------------+
| Name              | Relationship | Address           | Phone           |
+-------------------+--------------+-------------------+-----------------+
| Jackie Hilario | ECON         | [address]      | [phone] |
|                   |              | [place], [place]   |                 |
|                   |              | [address]             |                 |
+-------------------+--------------+-------------------+-----------------+
 
 
 
 Care Team Providers
 
 
 
+-----------------------+------+-------------+
| Care Team Member Name | Role | Phone       |
+-----------------------+------+-------------+
 PCP  | Unavailable |
+-----------------------+------+-------------+
 
 
 
 Encounter Details
 
 
+--------+-------------+---------------------+---------------------+---------------+
| Date   | Type        | Department          | Care Team           | Description   |
+--------+-------------+---------------------+---------------------+---------------+
| / | Office      |   General Internal  |   Note, Outpatient  | Progress Note |
|    | Visit-Trans | Medicine  [address]   | Clinic              |               |
|        | [address]      | [address]       |                     |               |
|        |             | Mailcode: L475      |                     |               |
|        |             | Outpatient Clinic   |                     |               |
|        |             | [facility] [address]      |                     |               |
|        |             | [place], [place]        |                     |               |
|        |             | [address]          |                     |               |
|        |             | [phone]        |                     |               |
+--------+-------------+---------------------+---------------------+---------------+
 
 
 
 Social History
 
 
+----------------+-------+-----------+--------+------+
| Tobacco Use    | Types | Packs/Day | Years  | Date |
|                |       |           | Used   |      |
+----------------+-------+-----------+--------+------+
| Never Assessed |       |           |        |      |
+----------------+-------+-----------+--------+------+
 
 
 
+------------------+---------------+
| Sex Assigned at  | Date Recorded |
| Birth            |               |
+------------------+---------------+
| Not on file      |               |
+------------------+---------------+
 
 
 
+----------------+-------------+-------------+
| Job Start Date | Occupation  | Industry    |
+----------------+-------------+-------------+
| Not on file    | Not on file | Not on file |
+----------------+-------------+-------------+
 
 
 
+----------------+--------------+------------+
| Travel History | Travel Start | Travel End |
 
+----------------+--------------+------------+
 
 
 
+-------------------------------------+
| No recent travel history available. |
+-------------------------------------+
 documented as of this encounter
 
 Progress Notes
 Interface, Transcription In - 2007  3:12 AM PST CLINIC DATE: 98
  
  UROLOGY ONCOLOGY CLINIC
  
  SUBJECTIVE: This 67-year-old white male found on digital rectal
  examination to have a prostatic nodule. His serum PSA obtained at that time
  was 20.9 with a 5% free PSA fraction. For that reason, the patient was
  referred to Dr. Bonner in Urology undergoing a transrectal ultrasound with
  biopsy of the prostate. Biopsies revealed extensive adenocarcinoma
  involving four millimeters of Melvin score 3+4 adenocarcinoma of the left
  base, six millimeters Melvin score 7 adenocarcinoma of the left mid, and 10
  milligrams of Melvin score 6 adenocarcinoma of the right base. Ploide
  studies revealed an anuploid tumor. The patient was advised regarding his
  options and now comes to [facility] for opinions about treatment options.
  
  PAST MEDICAL HISTORY: Operative procedures: Open excision of damaged
  meniscus, 1950, right knee; excision of pilonidal cyst in ; bilateral
  carpal tunnel release in . Major illnesses: Chronic hypertension,
  otherwise negative.
  
  MEDICATIONS:
  1. Cardura 8 milligrams q day.
  2. Allopurinol 300 milligrams q day.
  3. Dyazide one tablet q day.
  4. Aspirin one adult tablet q day.
  
  ALLERGIES: None known.
  
  SOCIAL HISTORY: Occupation: the patient serves as  for
  the [employer] in which [employer] is situated. Tobacco use: The patient used
  one pack or less of cigarettes per day for severals, none for the last
  several years. Alcohol: Moderate use on a social basis.
  
  FAMILY HISTORY: Father  age 77 of unknown causes. Mother  age 66
  from heart attack.
  
  REVIEW OF SYSTEMS: Basically noncontributory except for mild voiding
  dysfunction and is detailed in the clinical record of this date.
  
  OBJECTIVE: Blood pressure 164/90, temperature 95.5, weight 194. GENERAL:
  well-developed, well-nourished white male in no acute distress. ABDOMEN:
  Soft, nontender without organomegaly. Normal bowel sounds, no masses
  palpated. GENITALIA: Both testes descended without masses. Penis normal.
  Scrotum without lesions. RECTAL: The prostate is enlarged with a palpable
  area of firmness in the right midlobe, and an area of increased firmness
  along the midportion of the left lobe.
  
  Outside slides are not available for review.
  
  ASSESSMENT: Extensive adenocarcinoma of the prostate with several areas of
 
  pattern for tumor indicating an elevated PSA of 20. These findings of high
  grade tumor and PSA of 20 or greater indicate a high possibility of tumor
  involving the pelvic nodes, and tumor extending down the margins of the
  prostate. These possibilities are of extreme significance particularly
  prognostically to the patient and indicating the possibility of future
  therapeutic intervention effectiveness. My specific advice to the patient
  was that he undergo a biopsy of the seminal vesicles to eliminate seminal
  vesical involvement and if the seminal vesicles are not involved then a
  pelvic node dissection would be appropriate to eliminate the possibility of
  pelvic node involvement. The different types of tami dissection including
  open lymph node dissection, versus laparoscopic, essential risks and
  benefits of each were discussed in detail. The patient is aware that a
  laparoscopic node dissection has reduced morbidity with more rapid return to
  work, however, there are specific problems that can be associated with this
  procedure such as injury to adjacent visceral vascular structures,
  particularly vascular perforation from the trocars, complications of the CO2
  insufflation such as CO2 embolus, irritation to the gastrointestinal tract.
  The patient appears to understand and desires to undergo a needle biopsy
  prior to embarking upon any further treatment.
  
  
  
  [facility]
  Chief, Urologic Oncology
  , Surgery
  
  /flaquita
  D: 98
  T: 98
  
  cc:
  
  
  
  BETH OLIVEIRA MD
  [address] [place] [address]
  [facility] [address]
  
  
  TREMAINE BONNER DO
  [address]
  [facility] [address]
   Electronically signed by Interface, Transcription In at 2007  3:12 AM PSTdocumented
 in this encounter
 
 Plan of Treatment
 Not on filedocumented as of this encounter
 
 Visit Diagnoses
 Not on filedocumented in this encounter

## 2020-06-03 NOTE — XMS
Encounter Summary
  Created on: 2020
 
 Isak Hilario
 External Reference #: [id number]
 : 30
 Sex: Male
 
 Demographics
 
 
+-----------------------+------------------------+
| Address               | [address]       |
|                       | [place]  [address]   |
+-----------------------+------------------------+
| Home Phone            | [phone]        |
+-----------------------+------------------------+
| Preferred Language    | Unknown                |
+-----------------------+------------------------+
| Marital Status        |                 |
+-----------------------+------------------------+
| Worship Affiliation | NON                    |
+-----------------------+------------------------+
| Race                  | White                  |
+-----------------------+------------------------+
| Ethnic Group          | Not  or  |
+-----------------------+------------------------+
 
 
 Author
 
 
+--------------+------------------------------+
| Author       | [facility] |
+--------------+------------------------------+
| Organization | [facility] |
+--------------+------------------------------+
| Address      | Unknown                      |
+--------------+------------------------------+
| Phone        | Unavailable                  |
+--------------+------------------------------+
 
 
 
 Support
 
 
+-------------------+--------------+-------------------+-----------------+
| Name              | Relationship | Address           | Phone           |
+-------------------+--------------+-------------------+-----------------+
| Jackie Hilario | ECON         | [address]      | [phone] |
|                   |              | [place], [place]   |                 |
|                   |              | [address]             |                 |
+-------------------+--------------+-------------------+-----------------+
 
 
 
 Care Team Providers
 
 
 
+-----------------------+------+-------------+
| Care Team Member Name | Role | Phone       |
+-----------------------+------+-------------+
 PCP  | Unavailable |
+-----------------------+------+-------------+
 
 
 
 Encounter Details
 
 
+--------+-------------+----------------------+----------------------+------------------+
| Date   | Type        | Department           | Care Team            | Description      |
+--------+-------------+----------------------+----------------------+------------------+
| 05/15/ | Discharge   |   Allergy Clinic at  |   Summary, Discharge | D/C Summary ODDS |
|    | Summary-Tra | [facility]  [address]         |                      |                  |
|        | nscribed    | [address]  [place]   |                      |                  |
|        |             | [address]         |                      |                  |
|        |             | [facility], [address]th floor  |                      |                  |
|        |             |  [place], [place]        |                      |                  |
|        |             | [address]           |                      |                  |
|        |             | [phone]         |                      |                  |
+--------+-------------+----------------------+----------------------+------------------+
 
 
 
 Social History
 
 
+----------------+-------+-----------+--------+------+
| Tobacco Use    | Types | Packs/Day | Years  | Date |
|                |       |           | Used   |      |
+----------------+-------+-----------+--------+------+
| Never Assessed |       |           |        |      |
+----------------+-------+-----------+--------+------+
 
 
 
+------------------+---------------+
| Sex Assigned at  | Date Recorded |
| Birth            |               |
+------------------+---------------+
| Not on file      |               |
+------------------+---------------+
 
 
 
+----------------+-------------+-------------+
| Job Start Date | Occupation  | Industry    |
+----------------+-------------+-------------+
| Not on file    | Not on file | Not on file |
+----------------+-------------+-------------+
 
 
 
+----------------+--------------+------------+
| Travel History | Travel Start | Travel End |
+----------------+--------------+------------+
 
 
 
 
+-------------------------------------+
| No recent travel history available. |
+-------------------------------------+
 documented as of this encounter
 
 Discharge Summaries
 Interface, Transcription In - 2007  3:05 AM PST  [address] [address] [phone]
   [facility]
  
  MEDICAL SUMMARY OF HOSPITALIZATION
  
  Med Rec No.: [id number] Admission Date: 98
  
  Name: Isak Hilario Discharge Date: 05/15/98
  
  
  STAFF PHYSICIAN: [facility]
   Chief, Urologic Oncology
   , Surgery
  
  PRINCIPAL FINAL DIAGNOSIS: Prostate carcinoma.
  
  PRINCIPAL PROCEDURE: Radical retropubic prostatectomy and
   bilateral pelvic lymph node dissection.
  
  REASON FOR ADMISSION: The patient is a 67-year-old male with
   elevated prostatic-specific antigen and
   abnormal rectal examination in
  addition to prostate biopsies significant for adenocarcinoma in both lobes
  of the prostate, with a high Vaishali grade of 3+3/10. He had previously
  undergone follow up seminal vesicle biopsies which were negative. He
  preferred to undergo a radical retropubic prostatectomy. Intraoperative
  frozen sections from a pelvic lymph node dissection were negative.
  
  HOSPITAL COURSE: The patient was admitted postoperatively
   from the above procedure which he tolerated
   very well, with the frozen
  sections of the lymph nodes obviously negative, given that he also underwent
  the radical retropubic prostatectomy. He was start don sips on
  postoperative day one and advanced to clears and a regular diet by
  postoperative day two. He remained afebrile with stable vital signs and
  minimal output from his Kevin-Mendieta drain which was also discontinued on
  the morning of postoperative day two.
  
  The patient is being discharged to home on postoperative day two. He was
  tolerating a regular diet, passing gas and having undergone leg bag and
  night bag instructions for Soto catheter management.
  
  CONDITION ON DISCHARGE: Stable.
  
  DISCHARGE MEDICATION(S): Tylox 1-2 tablets q. 6 h. p.r.n.
   Colace 100 mg p.o. b.i.d.
   Bactrim DS 1 tablet p.o. b.i.d., to be taken
   after the catheter is removed.
  
 
  DISCHARGE INSTRUCTION(S): Discharge Activity: His activity is
   restricted to no lifting of greater than 10
   pounds.
  
   Discharge Diet: Resume a normal
   preoperative diet.
  
   Follow-up Recommendations:
   1. Follow up with Doctor Kelvin Hernandez in
   Urology in 8 weeks.
   2. He will follow up with his referring
   physician in two weeks for catheter
   removal.
  
  
  
  
  
  Win Sidhu M.D.
  Resident, Urology
  [facility]
  Chief, Urologic Oncology
  , SurgeryAMINAHK/rb
  D: 05/15/98
  T: 98 12:56 P
  
  cc:
  
  
  
  
  JUAN CARLOS OLIVEIRA MD
  [address]
  [place] [address]
   Electronically signed by Interface, Transcription In at 2007  3:05 AM PSTdocumented
 in this encounter
 
 Plan of Treatment
 Not on filedocumented as of this encounter
 
 Visit Diagnoses
 Not on filedocumented in this encounter

## 2020-06-03 NOTE — XMS
Encounter Summary
  Created on: 2020
 
 Isak Hilario
 External Reference #: [id number]
 : 30
 Sex: Male
 
 Demographics
 
 
+-----------------------+------------------------+
| Address               | [address]       |
|                       | [place]  [address]   |
+-----------------------+------------------------+
| Home Phone            | [phone]        |
+-----------------------+------------------------+
| Preferred Language    | Unknown                |
+-----------------------+------------------------+
| Marital Status        |                 |
+-----------------------+------------------------+
| Oriental orthodox Affiliation | NON                    |
+-----------------------+------------------------+
| Race                  | White                  |
+-----------------------+------------------------+
| Ethnic Group          | Not  or  |
+-----------------------+------------------------+
 
 
 Author
 
 
+--------------+------------------------------+
| Author       | [facility] |
+--------------+------------------------------+
| Organization | [facility] |
+--------------+------------------------------+
| Address      | Unknown                      |
+--------------+------------------------------+
| Phone        | Unavailable                  |
+--------------+------------------------------+
 
 
 
 Support
 
 
+-------------------+--------------+-------------------+-----------------+
| Name              | Relationship | Address           | Phone           |
+-------------------+--------------+-------------------+-----------------+
| Jackie Hilario | ECON         | [address]      | [phone] |
|                   |              | [place], [place]   |                 |
|                   |              | [address]             |                 |
+-------------------+--------------+-------------------+-----------------+
 
 
 
 Care Team Providers
 
 
 
+-----------------------+------+-------------+
| Care Team Member Name | Role | Phone       |
+-----------------------+------+-------------+
 PCP  | Unavailable |
+-----------------------+------+-------------+
 
 
 
 Encounter Details
 
 
+--------+-------------+-----------------+---------------------+---------------+
| Date   | Type        | Department      | Care Team           | Description   |
+--------+-------------+-----------------+---------------------+---------------+
| / | Office      |   [facility]  |   Note, Outpatient  | Progress Note |
| 1998   | Visit-Trans | MEDICINE        | Clinic              |               |
|        | cribed      |                 |                     |               |
+--------+-------------+-----------------+---------------------+---------------+
 
 
 
 Social History
 
 
+----------------+-------+-----------+--------+------+
| Tobacco Use    | Types | Packs/Day | Years  | Date |
|                |       |           | Used   |      |
+----------------+-------+-----------+--------+------+
| Never Assessed |       |           |        |      |
+----------------+-------+-----------+--------+------+
 
 
 
+------------------+---------------+
| Sex Assigned at  | Date Recorded |
| Birth            |               |
+------------------+---------------+
| Not on file      |               |
+------------------+---------------+
 
 
 
+----------------+-------------+-------------+
| Job Start Date | Occupation  | Industry    |
+----------------+-------------+-------------+
| Not on file    | Not on file | Not on file |
+----------------+-------------+-------------+
 
 
 
+----------------+--------------+------------+
| Travel History | Travel Start | Travel End |
+----------------+--------------+------------+
 
 
 
+-------------------------------------+
| No recent travel history available. |
 
+-------------------------------------+
 documented as of this encounter
 
 Progress Notes
 Interface, Transcription In - 2007  5:02 AM PST CLINIC DATE: 98
  
  UROLOGY/ONCOLOGY CLINIC
  
  Mr. Hilario is a 67-year-old male who was found to have elevated PSA of 20.9
  with a 5% free PSA fraction, and an abnormal digital rectal examination. He
  underwent biopsy of the prostate revealing Aleta score of 3+/-4,
  adenocarcinoma of the left base, left mid and right base. This was an
  amuploid tumor. He subsequently underwent radical retropubic prostatectomy
  with bilateral pelvic lymph node dissection on 98. His postoperative
  course was unremarkable. His Soto catheter was removed in two weeks and he
  has had no problems since that time. He returns today for routine followup.
  Patient states that he is currently dry as far as urinary continence is
  concerned. He wears no pads. He does complain of nocturia times one at
  night. He has no significant urge component. He is happy as far as this is
  concerned.
  
  Patient has had no erection since surgery and he is not interested in
  discussing any therapy at this time although outlined what possible
  therapies would be available for him if he continued to have erectile
  dysfunction after surgery.
  
  He brings his PSA in from his primary care doctor. It is at 6 weeks
  (98) undetectable at less than 0.04 by his laboratory's standards.
  Patient has no other concerns or complaints today.
  
  OBJECTIVE: Abdomen is soft and nontender. The incision is healing well
  without evidence of infection or hernia. Extremities no edema. No calf
  tenderness.
  
  ASSESSMENT: Doing well after radical prostatectomy with undetectable PSA.
  
  PLAN:
  1. Followup with us in four months or if patient desires he may have
   further followup care performed up by his primary care physician, Dr. Bari Oliveira. We recommend PSA to be checked every 3 months for the
   first year and then yearly after that.
  2. I explained to patient that he needs to be concerned and notify us if
   he has problems with decreasing force of urinary stream, bony pain or
   PSA begins to elevate on routine testing.
  
  
  Wojciech Koroma M.D.
  Resident, Urology
  
  CARI/pedro
  D: 98
  T: 98
  
  cc:
  
  
  
  NOEL OLIVEIRA MD
  [address] [facility] [place] [address]
 
   Electronically signed by Interface, Transcription In at 2007  5:02 AM PSTdocumented
 in this encounter
 
 Plan of Treatment
 Not on filedocumented as of this encounter
 
 Visit Diagnoses
 Not on filedocumented in this encounter

## 2020-06-03 NOTE — XMS
Encounter Summary
  Created on: 2020
 
 Isak Hilario
 External Reference #: [id number]
 : 30
 Sex: Male
 
 Demographics
 
 
+-----------------------+----------------------+
| Address               | [address]         |
|                       | [place]  [address] |
+-----------------------+----------------------+
| Home Phone            | [phone]      |
+-----------------------+----------------------+
| Preferred Language    | Unknown              |
+-----------------------+----------------------+
| Marital Status        |               |
+-----------------------+----------------------+
| Orthodoxy Affiliation | Unknown              |
+-----------------------+----------------------+
| Race                  | Unknown              |
+-----------------------+----------------------+
| Ethnic Group          | Unknown              |
+-----------------------+----------------------+
 
 
 Author
 
 
+--------------+--------------------------------------------+
| Author       | [facility]  |
|              | [facility] [place]                                |
+--------------+--------------------------------------------+
| Organization | [facility]  |
|              | [facility] [place]                                |
+--------------+--------------------------------------------+
| Address      | Unknown                                    |
+--------------+--------------------------------------------+
| Phone        | Unavailable                                |
+--------------+--------------------------------------------+
 
 
 
 Support
 
 
+-------------+--------------+---------+-----------------+
| Name        | Relationship | Address | Phone           |
+-------------+--------------+---------+-----------------+
| Alessandra Saxena | ECON         | Unknown | [phone] |
+-------------+--------------+---------+-----------------+
 
 
 
 Care Team Providers
 
 
 
+-----------------------+------+-------------+
| Care Team Member Name | Role | Phone       |
+-----------------------+------+-------------+
 PCP  | Unavailable |
+-----------------------+------+-------------+
 
 
 
 Reason for Visit
 
 
+-------------------+----------+
| Reason            | Comments |
+-------------------+----------+
| Medication Refill |          |
+-------------------+----------+
 
 
 
 Encounter Details
 
 
+--------+--------+----------------------+----------------------+-------------------+
| Date   | Type   | Department           | Care Team            | Description       |
+--------+--------+----------------------+----------------------+-------------------+
| 10/ | Refill |   [facility]      |   Kyree Smith PA  | Medication Refill |
|    |        | SLEEP DISORDER  [address]  |  [address]     |                   |
|        |        | [address]      | [place]      |                   |
|        |        | [place] [address] | [address]  [phone]  |                   |
|        |        |   [phone]       |  [phone] (Fax)  |                   |
+--------+--------+----------------------+----------------------+-------------------+
 
 
 
 Social History
 
 
+----------------+-------+-----------+--------+------+
| Tobacco Use    | Types | Packs/Day | Years  | Date |
|                |       |           | Used   |      |
+----------------+-------+-----------+--------+------+
| Never Assessed |       |           |        |      |
+----------------+-------+-----------+--------+------+
 
 
 
+------------------+---------------+
| Sex Assigned at  | Date Recorded |
| Birth            |               |
+------------------+---------------+
| Not on file      |               |
+------------------+---------------+
 
 
 
+----------------+-------------+-------------+
| Job Start Date | Occupation  | Industry    |
+----------------+-------------+-------------+
 
| Not on file    | Not on file | Not on file |
+----------------+-------------+-------------+
 
 
 
+----------------+--------------+------------+
| Travel History | Travel Start | Travel End |
+----------------+--------------+------------+
 
 
 
+-------------------------------------+
| No recent travel history available. |
+-------------------------------------+
 documented as of this encounter
 
 Plan of Treatment
 Not on filedocumented as of this encounter
 
 Visit Diagnoses
 
 
+---------------------------------------------------------+
| Diagnosis                                               |
+---------------------------------------------------------+
|   Obstructive sleep apnea (adult) (pediatric) - Primary |
+---------------------------------------------------------+
 documented in this encounter

## 2020-06-03 NOTE — XMS
Encounter Summary
  Created on: 2020
 
 Isak Hilario
 External Reference #: [id number]
 : 30
 Sex: Male
 
 Demographics
 
 
+-----------------------+------------------------+
| Address               | [address]       |
|                       | [place]  [address]   |
+-----------------------+------------------------+
| Home Phone            | [phone]        |
+-----------------------+------------------------+
| Preferred Language    | Unknown                |
+-----------------------+------------------------+
| Marital Status        |                 |
+-----------------------+------------------------+
| Spiritism Affiliation | NON                    |
+-----------------------+------------------------+
| Race                  | White                  |
+-----------------------+------------------------+
| Ethnic Group          | Not  or  |
+-----------------------+------------------------+
 
 
 Author
 
 
+--------------+------------------------------+
| Author       | [facility] |
+--------------+------------------------------+
| Organization | [facility] |
+--------------+------------------------------+
| Address      | Unknown                      |
+--------------+------------------------------+
| Phone        | Unavailable                  |
+--------------+------------------------------+
 
 
 
 Support
 
 
+-------------------+--------------+-------------------+-----------------+
| Name              | Relationship | Address           | Phone           |
+-------------------+--------------+-------------------+-----------------+
| Jackie Hilario | ECON         | [address]      | [phone] |
|                   |              | [place], [place]   |                 |
|                   |              | [address]             |                 |
+-------------------+--------------+-------------------+-----------------+
 
 
 
 Care Team Providers
 
 
 
+-----------------------+------+-------------+
| Care Team Member Name | Role | Phone       |
+-----------------------+------+-------------+
 PCP  | Unavailable |
+-----------------------+------+-------------+
 
 
 
 Encounter Details
 
 
+--------+----------+----------------------+--------------------+-------------+
| Date   | Type     | Department           | Care Team          | Description |
+--------+----------+----------------------+--------------------+-------------+
| / | Results  |   LAB [facility]  [address]  |   [facility]   |             |
|    | Only     | [address]  | [phone]       |             |
|        |          |  [place]        |                    |             |
|        |          | [address]           |                    |             |
|        |          | [phone]         |                    |             |
+--------+----------+----------------------+--------------------+-------------+
 
 
 
 Social History
 
 
+----------------+-------+-----------+--------+------+
| Tobacco Use    | Types | Packs/Day | Years  | Date |
|                |       |           | Used   |      |
+----------------+-------+-----------+--------+------+
| Never Assessed |       |           |        |      |
+----------------+-------+-----------+--------+------+
 
 
 
+------------------+---------------+
| Sex Assigned at  | Date Recorded |
| Birth            |               |
+------------------+---------------+
| Not on file      |               |
+------------------+---------------+
 
 
 
+----------------+-------------+-------------+
| Job Start Date | Occupation  | Industry    |
+----------------+-------------+-------------+
| Not on file    | Not on file | Not on file |
+----------------+-------------+-------------+
 
 
 
+----------------+--------------+------------+
| Travel History | Travel Start | Travel End |
+----------------+--------------+------------+
 
 
 
 
+-------------------------------------+
| No recent travel history available. |
+-------------------------------------+
 documented as of this encounter
 
 Plan of Treatment
 Not on filedocumented as of this encounter
 
 Procedures
 
 
+--------------------+--------+------------+----------------------+----------------------+
| Procedure Name     | Priori | Date/Time  | Associated Diagnosis | Comments             |
|                    | ty     |            |                      |                      |
+--------------------+--------+------------+----------------------+----------------------+
| SURGICAL PATHOLOGY | Routin | 1998 |                      |   Results for this   |
|                    | e      |            |                      | procedure are in the |
|                    |        |            |                      |  results section.    |
+--------------------+--------+------------+----------------------+----------------------+
 documented in this encounter
 
 Results
 SURGICAL PATHOLOGY (1998)
 
+-----------+--------------------------+-----------+-------------+--------------+
| Component | Value                    | Ref Range | Performed   | Pathologist  |
|           |                          |           | At          | Signature    |
+-----------+--------------------------+-----------+-------------+--------------+
| SURGICAL  | SOURCE OF SPECIMEN: SEE  |           | [facility]        |              |
| PATHOLOGY | RESULTS                  |           | DEPARTMENT  |              |
|           | Preliminary              |           | OF          |              |
|           | History:FROZEN SECTION   |           | PATHOLOGY   |              |
|           | DIAGNOSISRIGHT PELVIC    |           |             |              |
|           | NODE (SPECIMEN #1):   NO |           |             |              |
|           |  TUMOR SEENLEFT PELVIC   |           |             |              |
|           | NODE (SPECIMEN #2):   NO |           |             |              |
|           |  TUMOR SEEN              |           |             |              |
|           | Confirmed by: Bhavana       |           |             |              |
|           | BILL Leigh and Surya    |           |             |              |
|           | BILL Gonzalez           |           |             |              |
|           | CLINICAL HISTORY         |           |             |              |
|           | Patient Age: 67 year old |           |             |              |
|           |  man       Patient       |           |             |              |
|           | History: Prostate        |           |             |              |
|           | specific antigens (PSA)  |           |             |              |
|           | of 27 and high           |           |             |              |
|           | gradeadenocarcinoma of   |           |             |              |
|           | the prostate found by    |           |             |              |
|           | physical exam to have a  |           |             |              |
|           | nodule.Biopsy revealed   |           |             |              |
|           | extensive tumor. Rule    |           |             |              |
|           | out tumor extension      |           |             |              |
|           | beyond thecapsule and/or |           |             |              |
|           |  at the surgical         |           |             |              |
|           | margins.       GROSS     |           |             |              |
|           | DESCRIPTION              |           |             |              |
|           | Specimens received:      |           |             |              |
|           |   Two fresh, one in      |           |             |              |
|           | formalin       #1 RIGHT  |           |             |              |
|           | PELVIC NODE (FS):        |           |             |              |
 
|           | Received fresh are       |           |             |              |
|           | several                  |           |             |              |
|           | unorientedfragments of   |           |             |              |
|           | fibroadipose tissue      |           |             |              |
|           | measuring 5.5 x 2.7 x    |           |             |              |
|           | 1.5 cm. in               |           |             |              |
|           | looseaggregate. These    |           |             |              |
|           | tissues are sectioned    |           |             |              |
|           | and a representative     |           |             |              |
|           | lymph nodeis used for    |           |             |              |
|           | frozen section           |           |             |              |
|           | diagnosis. The remaining |           |             |              |
|           |  tissue fragments        |           |             |              |
|           | aresectioned.       #2   |           |             |              |
|           | LEFT PELVIS NODE (FS):   |           |             |              |
|           | Received fresh are       |           |             |              |
|           | several fragments        |           |             |              |
|           | ofunoriented             |           |             |              |
|           | fibroadipose tissue      |           |             |              |
|           | measuring 5.6 x 4.9 x    |           |             |              |
|           | 2.4 cm. in               |           |             |              |
|           | looseaggregate. These    |           |             |              |
|           | tissue fragments are     |           |             |              |
|           | sectioned.       #3      |           |             |              |
|           | PROSTATE: Received is a  |           |             |              |
|           | prostatectomy specimen   |           |             |              |
|           | weighing 52 g.           |           |             |              |
|           | andmeasuring 6 x 4.5 x   |           |             |              |
|           | 3.5 cm.       The        |           |             |              |
|           | attached portion of left |           |             |              |
|           |  seminal vesicle         |           |             |              |
|           | measures 2 x 1 x 1 cm.   |           |             |              |
|           | andthe left vas deferens |           |             |              |
|           |  measures 3 cm. in       |           |             |              |
|           | length by 0.5 cm. in     |           |             |              |
|           | diameter.The attached    |           |             |              |
|           | portion of right seminal |           |             |              |
|           |  vesicle measures 2.5 x  |           |             |              |
|           | 1 x 1 cm.and the right   |           |             |              |
|           | vas deferens measures    |           |             |              |
|           | 2.6 cm. in length by 0.5 |           |             |              |
|           |  cm. indiameter.         |           |             |              |
|           |       The left half of   |           |             |              |
|           | the specimen is inked    |           |             |              |
|           | black and the right half |           |             |              |
|           |  is inkedblue. The       |           |             |              |
|           | specimen is serially     |           |             |              |
|           | sectioned and notable    |           |             |              |
|           | for a tan                |           |             |              |
|           | poorlycircumscribed area |           |             |              |
|           |  along the middle left   |           |             |              |
|           | lobe which measures 1.7  |           |             |              |
|           | cm. ingreatest           |           |             |              |
|           | dimension. There is an   |           |             |              |
|           | underlying circumscribed |           |             |              |
|           |  white nodule ofpossible |           |             |              |
|           |  benign prostatic        |           |             |              |
|           | hypertrophy deep to this |           |             |              |
|           |  area measuring 1.5cm.   |           |             |              |
|           | in greatest diameter.    |           |             |              |
 
|           |     CASSETTE INDEX#1     |           |             |              |
|           | RIGHT PELVIC NODE        |           |             |              |
|           | (FS):1A, tissue used for |           |             |              |
|           |  frozen section          |           |             |              |
|           | diagnosis, resubmitted,  |           |             |              |
|           | RS1B, all possible lymph |           |             |              |
|           |  nodes, RS#2 LEFT PELVIS |           |             |              |
|           |  NODE (FS):2A, tissue    |           |             |              |
|           | used for frozen section  |           |             |              |
|           | diagnosis, resubmitted,  |           |             |              |
|           | RS2B, all possible lymph |           |             |              |
|           |  nodes, RS#3             |           |             |              |
|           | PROSTATE:3A-B, apical    |           |             |              |
|           | urethral margin, RS3C-D, |           |             |              |
|           |  surgical margin at      |           |             |              |
|           | base, RS3E-F, complete   |           |             |              |
|           | representative section   |           |             |              |
|           | of prostatic [id number],   |           |             |              |
|           | right and left middle    |           |             |              |
|           | lobes including area of  |           |             |              |
|           | possible tumor, RS3J-K,  |           |             |              |
|           | prostatic base, RS3L-M,  |           |             |              |
|           | seminal vesicles and vas |           |             |              |
|           |  deferens, RS       All  |           |             |              |
|           | tissue sections taken    |           |             |              |
|           | are submitted for        |           |             |              |
|           | microscopic              |           |             |              |
|           | evaluation.WSS/KHADRA:tg     |           |             |              |
|           |           SYNOPTIC       |           |             |              |
|           | DESCRIPTION - PROSTATE,  |           |             |              |
|           | RADICAL PROSTATECTOMY    |           |             |              |
|           | FOR CARCINOMA            |           |             |              |
|           | TUMOR TYPE:              |           |             |              |
|           |   Adenocarcinoma of      |           |             |              |
|           | prostateGLEASON'S SCORE  |           |             |              |
|           | 3/5+4/5 = 7/10TUMOR      |           |             |              |
|           | LOCATION:   [place]:    |           |             |              |
|           |     RIGHT       LEFT     |           |             |              |
|           |                          |           |             |              |
|           |                   [place]   |           |             |              |
|           |         [place]             |           |             |              |
|           |                          |           |             |              |
|           |           Mid            |           |             |              |
|           | Mid                      |           |             |              |
|           |                          |           |             |              |
|           |  BaseVASCULAR INVASION:  |           |             |              |
|           |                          |           |             |              |
|           |   NOPERINEURAL INVASION: |           |             |              |
|           |                          |           |             |              |
|           |   YESTUMOR INVADES TO    |           |             |              |
|           | PROSTATIC APEX:          |           |             |              |
|           |   NOTUMOR INVADES INTO   |           |             |              |
|           | PROSTATIC CAPSULE, BUT   |           |             |              |
|           | NOT BEYOND:              |           |             |              |
|           |   YESSTAGING:            |           |             |              |
|           |   Extracapsular          |           |             |              |
|           | extension                |           |             |              |
|           |                          |           |             |              |
|           |        (Stage pT3a)      |           |             |              |
|           |           SURGICAL       |           |             |              |
 
|           | MARGINS:   Tumor present |           |             |              |
|           |  at surgical margin:     |           |             |              |
|           |       YES   Specify      |           |             |              |
|           | site: Left               |           |             |              |
|           | midNON-NEOPLASTIC        |           |             |              |
|           | PROSTATE:                |           |             |              |
|           |   Unremarkable:    NO    |           |             |              |
|           |   Abnormalities:         |           |             |              |
|           |      Nodular             |           |             |              |
|           | hyperplasiaLYMPH NODES:  |           |             |              |
|           |   Involved nodes/total   |           |             |              |
|           | nodes:   0/9       FINAL |           |             |              |
|           |  DIAGNOSIS#1 RIGHT       |           |             |              |
|           | PELVIC NODE:   FOUR      |           |             |              |
|           | LYMPH NODES WITH NO      |           |             |              |
|           | TUMOR SEEN (0/4)#2 LEFT  |           |             |              |
|           | PELVIS NODE:   FIVE      |           |             |              |
|           | LYMPH NODES WITH NO      |           |             |              |
|           | TUMOR SEEN (0/5)#3       |           |             |              |
|           | PROSTATE:   PROSTATIC    |           |             |              |
|           | ADENOCARCINOMA,          |           |             |              |
|           | ALINE'S GRADE 3/5+4/5, |           |             |              |
|           |  WITH PERINEURAL         |           |             |              |
|           |   INVASION   (SEE NOTE)  |           |             |              |
|           |       Note:   The tumor  |           |             |              |
|           | involves both lobes and  |           |             |              |
|           | is present at or near    |           |             |              |
|           | thecapsular margin (left |           |             |              |
|           |  mid, sections 3G and    |           |             |              |
|           | 3F).       Case reviewed |           |             |              |
|           |  by:   ESLA Das,  |           |             |              |
|           | Student FellowCase       |           |             |              |
|           | staffed by:   Jt TUCKER     |           |             |              |
|           | Sailaja,               |           |             |              |
|           | M.D.T:98/       My |           |             |              |
|           |  electronic signature    |           |             |              |
|           | indicates that I have    |           |             |              |
|           | personally reviewed      |           |             |              |
|           | alldiagnostic slides,    |           |             |              |
|           | the gross and/or         |           |             |              |
|           | microscopic portion of   |           |             |              |
|           | thisreport and           |           |             |              |
|           | formulated the final     |           |             |              |
|           | diagnosis.               |           |             |              |
+-----------+--------------------------+-----------+-------------+--------------+
 
 
 
+----------+
| Specimen |
+----------+
| Other    |
+----------+
 
 
 
+----------------------+------------------------+----------------------+--------------+
| Performing           | Address                | City/State/Zipcode   | Phone Number |
| Organization         |                        |                      |              |
+----------------------+------------------------+----------------------+--------------+
 
|   [facility] |   [address]  |   [place] [address] |              |
|  PATHOLOGY           | [address]                |                      |              |
+----------------------+------------------------+----------------------+--------------+
 documented in this encounter
 
 Visit Diagnoses
 Not on filedocumented in this encounter

## 2020-06-03 NOTE — XMS
Encounter Summary
  Created on: 2020
 
 Isak Hilario
 External Reference #: [id number]
 : 30
 Sex: Male
 
 Demographics
 
 
+-----------------------+------------------------+
| Address               | [address]       |
|                       | [place]  [address]   |
+-----------------------+------------------------+
| Home Phone            | [phone]        |
+-----------------------+------------------------+
| Preferred Language    | Unknown                |
+-----------------------+------------------------+
| Marital Status        |                 |
+-----------------------+------------------------+
| Temple Affiliation | NON                    |
+-----------------------+------------------------+
| Race                  | White                  |
+-----------------------+------------------------+
| Ethnic Group          | Not  or  |
+-----------------------+------------------------+
 
 
 Author
 
 
+--------------+------------------------------+
| Author       | [facility] |
+--------------+------------------------------+
| Organization | [facility] |
+--------------+------------------------------+
| Address      | Unknown                      |
+--------------+------------------------------+
| Phone        | Unavailable                  |
+--------------+------------------------------+
 
 
 
 Support
 
 
+-------------------+--------------+-------------------+-----------------+
| Name              | Relationship | Address           | Phone           |
+-------------------+--------------+-------------------+-----------------+
| Jackie Hilario | ECON         | [address]      | [phone] |
|                   |              | [place], [place]   |                 |
|                   |              | [address]             |                 |
+-------------------+--------------+-------------------+-----------------+
 
 
 
 Care Team Providers
 
 
 
+-----------------------+------+-------------+
| Care Team Member Name | Role | Phone       |
+-----------------------+------+-------------+
 PCP  | Unavailable |
+-----------------------+------+-------------+
 
 
 
 Encounter Details
 
 
+--------+-------------+----------------------+---------------------+------------------+
| Date   | Type        | Department           | Care Team           | Description      |
+--------+-------------+----------------------+---------------------+------------------+
| / | Procedure - |   [facility]   |   Record, Operation | Operative Report |
|    |             | [facility]  [address] |                     |                  |
|        | Transcribed |  [address]          |                     |                  |
|        |             | Mailcode:  [facility]    |                     |                  |
|        |             | [facility]       |                     |                  |
|        |             | Healing, [address]  |                     |                  |
|        |             |  [place], [place]        |                     |                  |
|        |             | [address]           |                     |                  |
|        |             | [phone]         |                     |                  |
+--------+-------------+----------------------+---------------------+------------------+
 
 
 
 Social History
 
 
+----------------+-------+-----------+--------+------+
| Tobacco Use    | Types | Packs/Day | Years  | Date |
|                |       |           | Used   |      |
+----------------+-------+-----------+--------+------+
| Never Assessed |       |           |        |      |
+----------------+-------+-----------+--------+------+
 
 
 
+------------------+---------------+
| Sex Assigned at  | Date Recorded |
| Birth            |               |
+------------------+---------------+
| Not on file      |               |
+------------------+---------------+
 
 
 
+----------------+-------------+-------------+
| Job Start Date | Occupation  | Industry    |
+----------------+-------------+-------------+
| Not on file    | Not on file | Not on file |
+----------------+-------------+-------------+
 
 
 
+----------------+--------------+------------+
| Travel History | Travel Start | Travel End |
 
+----------------+--------------+------------+
 
 
 
+-------------------------------------+
| No recent travel history available. |
+-------------------------------------+
 documented as of this encounter
 
 Plan of Treatment
 Not on filedocumented as of this encounter
 
 Procedures
 
 
+------------------+--------+-------------+----------------------+----------------------+
| Procedure Name   | Priori | Date/Time   | Associated Diagnosis | Comments             |
|                  | ty     |             |                      |                      |
+------------------+--------+-------------+----------------------+----------------------+
| OPERATION RECORD |        | 1998  |                      |   Results for this   |
|                  |        | 12:00 AM    |                      | procedure are in the |
|                  |        | PDT         |                      |  results section.    |
+------------------+--------+-------------+----------------------+----------------------+
 documented in this encounter
 
 Results
 OPERATION RECORD (1998 12:00 AM PDT)
 
+-------------------------------------------------------------------------------+
| Procedure Note                                                                |
+-------------------------------------------------------------------------------+
|   1998 12:00 AM PDT    [place]                                           |
|   [facility]                                                  |
|  [address], [place] [address] [phone]   |
|   [facility]                                            |
|                                                                               |
|  OPERATION RECORD                                                             |
|                                                                               |
|  Med Rec No.: [id number] Date: 98                                      |
|                                                                               |
|  Name: Isak Hilario                                                       |
|                                                                               |
|                                                                               |
|  ATTENDING SURGEON:                                                           |
|  [facility]                                                       |
|  Chief, Urologic Oncology                                                     |
|  , Surgery                                                 |
|  ASSISTANT(S):                                                                |
|  Hawk Garrison M.D.                                                       |
|  Resident, Urology                                                            |
|  PREOPERATIVE DIAGNOSIS: Stage T2b adenocarcinoma of the prostate.            |
|                                                                               |
|  POSTOPERATIVE DIAGNOSIS(ES): The same.                                       |
|                                                                               |
|  OPERATION(S) PERFORMED: 1. Bilateral pelvic lymph node dissection.           |
|   2. Radical retropubic prostatectomy.                                        |
|                                                                               |
|  ANESTHESIA: General.                                                         |
|                                                                               |
|  SPECIMEN(S) REMOVED: 1. Right and left pelvic lymph nodes.                   |
 
|   2. Prostate and seminal vesicles.                                           |
|                                                                               |
|  INDICATIONS: This is a 67-year-old man who was diagnosed                     |
|   with Charlotte's 3+/4 adenocarcinoma of the                                   |
|   prostate in five of                                                         |
|  six sections of biopsies. He was diagnosed based on a nodule felt on rectal  |
|  examination, as well as a PSA of approximate 20. Repeat biopsies of seminal  |
|  vesicles revealed no evidence of extra prostatic disease. He was counseled   |
|  regarding his options and elected surgery. He was brought to the Operating   |
|  Room now for a radical prostatectomy.                                        |
|                                                                               |
|  FINDINGS: Bilateral pelvic lymph node dissections were                       |
|   performed, and no significant adenopathy was                                |
|   encountered. Frozen                                                         |
|  sections were performed by the pathologist, and no evidence of metastatic    |
|  tumor was seen.                                                              |
|                                                                               |
|  The prostate had no obvious extra prostatic disease or obvious extracapsular |
|  extension. No nerve sparing procedure was attempted. The bladder neck was    |
|  spared. Estimated blood loss was 450 ml. Total operative time was            |
|  approximately three hours.                                                   |
|                                                                               |
|  PROCEDURE: The patient was taken to the Operating Room,                      |
|   identified, and general anesthesia induced.                                 |
|   He was placed in the                                                        |
|  supine position and his abdomen and genitalia prepped and draped in the      |
|  usual sterile fashion. An 18 Cuban Soto catheter was placed                |
|  transurethrally into the bladder. A midline incision was made, extending     |
|  from just below the umbilicus down to the level of the pubis, carried down   |
|  through the subcutaneous tissues, and the fascia was incised in the midline. |
|  The retropubic space was bluntly developed, exposing the anterior wall to    |
|  bladder, the prostate, and external iliac vessels. A self-retaining          |
|  retractor was then placed and, beginning with the left side, bilateral       |
|  pelvic lymph node dissections were performed. Margins of lymph node          |
|  dissection included the anterior border of the external iliac vein           |
|  anteriorly, the obturator nerve posteriorly, the bifurcation of the iliac    |
|  vessels superiorly, and Bimal's ligament inferiorly. All lymphatic tissue   |
|  within these borders was removed, and hemoclips were placed across all       |
|  lymphatic vessels prior to dividing them. Once the two bilateral lymph node  |
|  dissections were performed, the self-retaining retractor was repositioned    |
|  and the fat overlying the anterior portion of the prostate was excised,      |
|  using electrocautery. This exposed the endopelvic fascia.                    |
|                                                                               |
|  Once frozen sections were completed and they were read as having no evidence |
|  of metastatic disease, the endopelvic fascia was opened bilaterally, using   |
|  electrocautery. The levator ani muscles were then bluntly pushed laterally   |
|  off the lateral aspect of the prostate. The dorsal vein complex, including   |
|  the lateral edge of the incised endopelvic fascia, was then gathered in a    |
|  pair of Childs clamps and ligated proximally and distally with 0 chromic.   |
|  The dorsal vein was then divided and sharp dissection was used to dissect    |
|  the underlying apex of the prostate off of the dorsal vein complex. There    |
|  was some bleeding encountered from the dorsal vein complex at this point,    |
|  and it was oversewn with a 3-0 Biosin suture. The anterior wall of the       |
|  urethra was then divided and the Soto catheter was brought up through the   |
|  defect of the anterior wall of the urethra, clamped, and divided. It was     |
|  left in the bladder to use as a retractor through the remainder of the case. |
|  The posterior wall of the urethra was also sharply divided and the lateral   |
|  pillars on either side of the urethra were clipped and then divided. The     |
|  lateral pedicles to the prostate were then divided, with hemoclips being in  |
|  place across them, near the apex, and 0 silk sutures near the base. The 0    |
 
|  silk sutures were placed across the main pedicle of the prostate and tied    |
|  prior to dividing it. This allowed exposure of the lateral aspect of the     |
|  seminal vesicles on both sides, and an umbilical tape was passed between the |
|  seminal vessels and the bladder neck. The bladder neck was then dissected    |
|  off the prostate, using electrocautery. There was a small aspect of median   |
|  lobe prostate tissue, which was dissected out of the posterior bladder neck. |
|  The seminal vesicles were then dissected free of surrounding tissues, using  |
|  hemoclips to clip all vessels, and the ampullae of the vas were divided      |
|  after placing large hemoclips across them on both sides. The specimen was    |
|  then removed from the field. The epithelium of the bladder neck was          |
|  advanced, using a series of interrupted 3-0 chromic. The field was           |
|  inspected and no obvious bleeding was noted. A _____________ was placed      |
|                                                                               |
|  into the urethra and a series of anastomotic sutures were placed in the      |
|  urethral stump at 2 o'clock, 4 o'clock, 6 o'clock, 8 o'clock, and 10 o'clock |
|  positions. These were 0 chromic sutures. Sutures were then placed in         |
|  corresponding positions on the bladder neck, and a #20 Cuban Soto catheter |
|  was placed through the urethra and into the bladder.                         |
|  The table was then flattened and the bladder neck brought into approximation |
|  with the urethral stump and the sutures tied into position. The wound as     |
|  irrigated. A 10 mm flat Kevin-Mendieta drain was placed adjacent to the       |
|  anastomosis and brought out through a separate stab wound to the left of the |
|  incision. It was secured to the skin with a 2-0 nylon. The fascia was closed |
|  with a running #1 Biosin, the subcutaneous tissue closed with a running 3-0  |
|  Vicryl, and the skin closed with a running 4-0 Vicryl subcuticular stitch.   |
|  Sterile dressings were applied. The patient tolerated the procedure well,    |
|  was extubated, and returned to the Post Anesthesia Care Unit in stable       |
|  condition.                                                                   |
|                                                                               |
|  Dr. Hernandez was present and actively participated in the entire procedure.      |
|                                                                               |
|  Hawk Garrison M.D.                                                       |
|  Resident, Urology                                                            |
|  [facility]                                                       |
|  Chief, Urologic Oncology                                                     |
|  , Surgery                                                 |
|  [facility]                                                                      |
|  D: 98                                                                  |
|  T: 98 7:51 A                                                           |
|                                                                               |
|                                                                               |
+-------------------------------------------------------------------------------+
 documented in this encounter
 
 Visit Diagnoses
 Not on filedocumented in this encounter

## 2020-06-03 NOTE — XMS
Encounter Summary
  Created on: 2020
 
 Isak Hilario
 External Reference #: [id number]
 : 30
 Sex: Male
 
 Demographics
 
 
+-----------------------+----------------------+
| Address               | [address]         |
|                       | [place]  [address] |
+-----------------------+----------------------+
| Home Phone            | [phone]      |
+-----------------------+----------------------+
| Preferred Language    | Unknown              |
+-----------------------+----------------------+
| Marital Status        |               |
+-----------------------+----------------------+
| Congregation Affiliation | Unknown              |
+-----------------------+----------------------+
| Race                  | Unknown              |
+-----------------------+----------------------+
| Ethnic Group          | Unknown              |
+-----------------------+----------------------+
 
 
 Author
 
 
+--------------+--------------------------------------------+
| Author       | [facility]  |
|              | and [place]                                |
+--------------+--------------------------------------------+
| Organization | [facility]  |
|              | and [place]                                |
+--------------+--------------------------------------------+
| Address      | Unknown                                    |
+--------------+--------------------------------------------+
| Phone        | Unavailable                                |
+--------------+--------------------------------------------+
 
 
 
 Support
 
 
+-------------+--------------+---------+-----------------+
| Name        | Relationship | Address | Phone           |
+-------------+--------------+---------+-----------------+
| Alessandra Saxena | ECON         | Unknown | [phone] |
+-------------+--------------+---------+-----------------+
 
 
 
 Care Team Providers
 
 
 
+-----------------------+------+-----------------+
| Care Team Member Name | Role | Phone           |
+-----------------------+------+-----------------+
| Bari Ha MD | PCP  | [phone] |
+-----------------------+------+-----------------+
 
 
 
 Encounter Details
 
 
+--------+-------------+----------------------+--------------------+----------------------+
| Date   | Type        | Department           | Care Team          | Description          |
+--------+-------------+----------------------+--------------------+----------------------+
| / | Lab         |   [facility] |   [facility]   | Acute on chronic     |
| 2020   | Requisition |  [facility]  | [address]   | diastolic            |
|        |             |  [address]  [place] | [address]   | (congestive) heart   |
|        |             |  [place]           | [address]  [place]     | failure (HCC);       |
|        |             | [address]           | [place] [address]    | Metabolic            |
|        |             | [phone]         | [phone]       | encephalopathy;      |
|        |             |                      | [phone] (Fax) | Essential (primary)  |
|        |             |                      |                    | hypertension         |
+--------+-------------+----------------------+--------------------+----------------------+
 
 
 
 Social History
 
 
+--------------+-------+-----------+--------+------+
| Tobacco Use  | Types | Packs/Day | Years  | Date |
|              |       |           | Used   |      |
+--------------+-------+-----------+--------+------+
| Never Smoker |       |           |        |      |
+--------------+-------+-----------+--------+------+
 
 
 
+---------------------+------+---+----------+
| Smokeless Tobacco:  | Chew |   | Quit:    |
| Former User         |      |   | 19 |
|                     |      |   | 85       |
+---------------------+------+---+----------+
 
 
 
+-------------+-------------+---------+----------+
| Alcohol Use | Drinks/Week | oz/Week | Comments |
+-------------+-------------+---------+----------+
| Not Asked   |             |         |          |
+-------------+-------------+---------+----------+
 
 
 
+------------------+---------------+
| Sex Assigned at  | Date Recorded |
| Birth            |               |
+------------------+---------------+
 
| Not on file      |               |
+------------------+---------------+
 
 
 
+----------------+-------------+-------------+
| Job Start Date | Occupation  | Industry    |
+----------------+-------------+-------------+
| Not on file    | Not on file | Not on file |
+----------------+-------------+-------------+
 
 
 
+----------------+--------------+------------+
| Travel History | Travel Start | Travel End |
+----------------+--------------+------------+
 
 
 
+-------------------------------------+
| No recent travel history available. |
+-------------------------------------+
 documented as of this encounter
 
 Plan of Treatment
 Not on filedocumented as of this encounter
 
 Procedures
 
 
+------------------+--------+-------------+----------------------+----------------------+
| Procedure Name   | Priori | Date/Time   | Associated Diagnosis | Comments             |
|                  | ty     |             |                      |                      |
+------------------+--------+-------------+----------------------+----------------------+
| BASIC METABOLIC  | Routin | 2020  |   Acute on chronic   |   Results for this   |
| PANEL            | e      |  1:30 PM    | diastolic            | procedure are in the |
|                  |        | PDT         | (congestive) heart   |  results section.    |
|                  |        |             | failure (HCC)        |                      |
|                  |        |             | Metabolic            |                      |
|                  |        |             | encephalopathy       |                      |
|                  |        |             | Essential (primary)  |                      |
|                  |        |             | hypertension         |                      |
+------------------+--------+-------------+----------------------+----------------------+
 documented in this encounter
 
 Results
 Basic Metabolic Panel (2020  1:30 PM PDT)
 
+-------------+--------------------------+-----------------+-------------+--------------+
| Component   | Value                    | Ref Range       | Performed   | Pathologist  |
|             |                          |                 | At          | Signature    |
+-------------+--------------------------+-----------------+-------------+--------------+
| Na          | 137                      | 136 - 145       | [facility]  |              |
|             |                          | mmol/L          | [facility]    |              |
|             |                          |                 | [facility]     |              |
|             |                          |                 | [facility] -    |              |
|             |                          |                 | LABORATORY  |              |
+-------------+--------------------------+-----------------+-------------+--------------+
| K           | 3.1 (L)                  | 3.4 - 5.1       | [facility]  |              |
|             |                          | mmol/L          | [facility]    |              |
 
|             |                          |                 | [facility]     |              |
|             |                          |                 | [facility]ENTER -    |              |
|             |                          |                 | LABORATORY  |              |
+-------------+--------------------------+-----------------+-------------+--------------+
| Cl          | 94 (L)                   | 98 - 107 mmol/L | [facility]  |              |
|             |                          |                 | [facility]    |              |
|             |                          |                 | [facility]     |              |
|             |                          |                 | CENTER -    |              |
|             |                          |                 | LABORATORY  |              |
+-------------+--------------------------+-----------------+-------------+--------------+
| CO2         | 34 (H)                   | 20 - 31 mmol/L  | [facility]  |              |
|             |                          |                 | [facility]    |              |
|             |                          |                 | MED[facility]     |              |
|             |                          |                 | CENTER -    |              |
|             |                          |                 | LABORATORY  |              |
+-------------+--------------------------+-----------------+-------------+--------------+
| Anion Gap   | 9                        | 3 - 16 mmol/L   | [facility]  |              |
|             |                          |                 | [facility]    |              |
|             |                          |                 | [facility]     |              |
|             |                          |                 | CENTER -    |              |
|             |                          |                 | LABORATORY  |              |
+-------------+--------------------------+-----------------+-------------+--------------+
| Glucose     | 159 (H)                  | 60 - 106 mg/dL  | [facility]  |              |
|             |                          |                 | [facility]    |              |
|             |                          |                 | [facility]     |              |
|             |                          |                 | CENTER -    |              |
|             |                          |                 | LABORATORY  |              |
+-------------+--------------------------+-----------------+-------------+--------------+
| BUN         | 37 (H)                   | 9 - 23 mg/dL    | [facility]  |              |
|             |                          |                 | [facility]    |              |
|             |                          |                 | [facility]     |              |
|             |                          |                 | [facility] -    |              |
|             |                          |                 | LABORATORY  |              |
+-------------+--------------------------+-----------------+-------------+--------------+
| Creatinine  | 1.30                     | 0.70 - 1.30     | [facility]  |              |
|             |                          | mg/dL           | [facility]    |              |
|             |                          |                 | MED[facility]     |              |
|             |                          |                 | CENTER -    |              |
|             |                          |                 | LABORATORY  |              |
+-------------+--------------------------+-----------------+-------------+--------------+
| eGFR if not | 52 (L)Comment:           | >=60            | [facility]  |              |
|      | GLOMERULAR FILTRATION    | mL/min/1.73m2   | [facility]    |              |
| [employer]    | RATE,ESTIMATED           |                 | MEDICAL     |              |
|             |   mL/min/1.24d3Vwcd than |                 | CENTER -    |              |
|             |  60    Chronic kidney    |                 | LABORATORY  |              |
|             | disease,if found over a  |                 |             |              |
|             | 3-month period.Less than |                 |             |              |
|             |  15    Kidney failureFor |                 |             |              |
|             |                   |                 |             |              |
|             | Americans,multiply the   |                 |             |              |
|             | calculated GFR by 1.21.  |                 |             |              |
|             |                          |                 |             |              |
+-------------+--------------------------+-----------------+-------------+--------------+
| Calcium     | 9.6                      | 8.7 - 10.4      | [facility]  |              |
|             |                          | mg/dL           | [facility]    |              |
|             |                          |                 | MED[facility]     |              |
|             |                          |                 | CENTER -    |              |
|             |                          |                 | LABORATORY  |              |
+-------------+--------------------------+-----------------+-------------+--------------+
| BUN/Creatin | 28.5                     |                 | [facility]  |              |
 
| ine Ratio   |                          |                 | [facility]    |              |
|             |                          |                 | MED[facility]     |              |
|             |                          |                 | CENTER -    |              |
|             |                          |                 | LABORATORY  |              |
+-------------+--------------------------+-----------------+-------------+--------------+
 
 
 
+----------+
| Specimen |
+----------+
| Blood    |
+----------+
 
 
 
+----------------------+--------------------+--------------------+----------------+
| Performing           | Address            | City/State/Zipcode | Phone Number   |
| Organization         |                    |                    |                |
+----------------------+--------------------+--------------------+----------------+
|   [facility]     |   [address] |   [place]  |   [phone] |
| [facility]  |                    | [address]              |                |
| - LABORATORY         |                    |                    |                |
+----------------------+--------------------+--------------------+----------------+
 documented in this encounter
 
 Visit Diagnoses
 
 
+------------------------------------------------------------------------+
| Diagnosis                                                              |
+------------------------------------------------------------------------+
|   Acute on chronic diastolic (congestive) heart failure (HCC)          |
+------------------------------------------------------------------------+
|   Metabolic encephalopathy                                             |
+------------------------------------------------------------------------+
|   Essential (primary) hypertension  Unspecified essential hypertension |
+------------------------------------------------------------------------+
 documented in this encounter

## 2020-06-03 NOTE — XMS
Encounter Summary
  Created on: 2020
 
 Isak Hilario
 External Reference #: [id number]
 : 30
 Sex: Male
 
 Demographics
 
 
+-----------------------+----------------------+
| Address               | [address]         |
|                       | [place]  [address] |
+-----------------------+----------------------+
| Home Phone            | [phone]      |
+-----------------------+----------------------+
| Preferred Language    | Unknown              |
+-----------------------+----------------------+
| Marital Status        |               |
+-----------------------+----------------------+
| [employer] Affiliation | Unknown              |
+-----------------------+----------------------+
| Race                  | Unknown              |
+-----------------------+----------------------+
| Ethnic Group          | Unknown              |
+-----------------------+----------------------+
 
 
 Author
 
 
+--------------+--------------------------------------------+
| Author       | [facility]  |
|              | and [place]                                |
+--------------+--------------------------------------------+
| Organization | [facility]  |
|              | and [place]                                |
+--------------+--------------------------------------------+
| Address      | Unknown                                    |
+--------------+--------------------------------------------+
| Phone        | Unavailable                                |
+--------------+--------------------------------------------+
 
 
 
 Support
 
 
+-------------+--------------+---------+-----------------+
| Name        | Relationship | Address | Phone           |
+-------------+--------------+---------+-----------------+
| Alessandra Saxena | ECON         | Unknown | [phone] |
+-------------+--------------+---------+-----------------+
 
 
 
 Care Team Providers
 
 
 
+-----------------------+------+-----------------+
| Care Team Member Name | Role | Phone           |
+-----------------------+------+-----------------+
| Bari Ha MD | PCP  | [phone] |
+-----------------------+------+-----------------+
 
 
 
 Reason for Visit
 
 
+------------+----------------------+
| Reason     | Comments             |
+------------+----------------------+
| Wrist Pain | Bilateral wrist pain |
+------------+----------------------+
| Numbness   |                      |
+------------+----------------------+
 
 
 
 Encounter Details
 
 
+--------+---------+---------------------+----------------------+--------------------+
| Date   | Type    | Department          | Care Team            | Description        |
+--------+---------+---------------------+----------------------+--------------------+
| / | Office  |   [facility]         |   Ed Patel   | Carpal tunnel      |
|    | Visit   | PHYSIATRY  [address]    | T, MD  [address]  | syndrome (Primary  |
|        |         | [address]   | [place]  [place]  | Dx); DEGENERATIVE  |
|        |         | [place], [place]     | [address]  [phone]  | DISC DISEASE,      |
|        |         | [address]          |  [phone] (Fax)  | CERVICAL SPINE;    |
|        |         | [phone]        |                      | Diabetes mellitus  |
|        |         |                     |                      | (HCC); Diabetic    |
|        |         |                     |                      | peripheral         |
|        |         |                     |                      | neuropathy ([facility]);  |
|        |         |                     |                      | Paresthesia        |
+--------+---------+---------------------+----------------------+--------------------+
 
 
 
 Social History
 
 
+--------------+-------+-----------+--------+------+
| Tobacco Use  | Types | Packs/Day | Years  | Date |
|              |       |           | Used   |      |
+--------------+-------+-----------+--------+------+
| Never Smoker |       |           |        |      |
+--------------+-------+-----------+--------+------+
 
 
 
+-------------+-------------+---------+----------+
| Alcohol Use | Drinks/Week | oz/Week | Comments |
+-------------+-------------+---------+----------+
| Not Asked   |             |         |          |
+-------------+-------------+---------+----------+
 
 
 
 
+------------------+---------------+
| Sex Assigned at  | Date Recorded |
| Birth            |               |
+------------------+---------------+
| Not on file      |               |
+------------------+---------------+
 
 
 
+----------------+-------------+-------------+
| Job Start Date | Occupation  | Industry    |
+----------------+-------------+-------------+
| Not on file    | Not on file | Not on file |
+----------------+-------------+-------------+
 
 
 
+----------------+--------------+------------+
| Travel History | Travel Start | Travel End |
+----------------+--------------+------------+
 
 
 
+-------------------------------------+
| No recent travel history available. |
+-------------------------------------+
 documented as of this encounter
 
 Last Filed Vital Signs
 
 
+-------------------+------------------+----------------------+----------+
| Vital Sign        | Reading          | Time Taken           | Comments |
+-------------------+------------------+----------------------+----------+
| Blood Pressure    | 128/85           | 2013 10:52 AM  |          |
|                   |                  | PST                  |          |
+-------------------+------------------+----------------------+----------+
| Pulse             | 69               | 2013 10:52 AM  |          |
|                   |                  | PST                  |          |
+-------------------+------------------+----------------------+----------+
| Temperature       | -                | -                    |          |
+-------------------+------------------+----------------------+----------+
| Respiratory Rate  | -                | -                    |          |
+-------------------+------------------+----------------------+----------+
| Oxygen Saturation | -                | -                    |          |
+-------------------+------------------+----------------------+----------+
| Inhaled Oxygen    | -                | -                    |          |
| Concentration     |                  |                      |          |
+-------------------+------------------+----------------------+----------+
| Weight            | 95.3 kg (210 lb) | 2013 10:52 AM  |          |
|                   |                  | PST                  |          |
+-------------------+------------------+----------------------+----------+
| Height            | 165.1 cm (5' 5") | 2013 10:52 AM  |          |
|                   |                  | PST                  |          |
+-------------------+------------------+----------------------+----------+
| Body Mass Index   | 34.95            | 2013 10:52 AM  |          |
|                   |                  | PST                  |          |
 
+-------------------+------------------+----------------------+----------+
 documented in this encounter
 
 Patient Instructions
 Patient Instructions Ed Patel MD - 2013  6:20 AM PSTIce the area as needed
 for 15-20 minutes several times over the next few days.  Watch for signs of infection.  Bryan
l with questions or concerns.
 Electronically signed by Ed Patel MD at 2013  6:20 AM PST
 documented in this encounter
 
 Progress Notes
 Ed Patel MD - 2013 10:57 AM PSTFormatting of this note might be different 
from the original.
 Subjective: 
  
 Patient ID: Isak Hilario is a 82 y.o. male.
 Chief Complaint 
 Patient presents with 
   Wrist Pain 
   Bilateral wrist pain 
   Numbness 
 
 HPI The patient is being seen today in follow-up for complaints of bilateral wrist pain as 
well as numbness in the first three digits on both hands. The patient had prior carpal tunne
l releases in .  He reports that for several years after he had improvement of his sympt
oms.  The symptoms then started to reoccur approximately 3 years ago.  He was having signifi
cant neck pain at that time as well and x-rays and an MRI were ordered.  He was found to hav
e significant DDD and some cervical stenosis.  He then had EMG and nerve conduction studies 
by Dr. Smith Acuña which showed severe residual or recurrent carpal tunnel syndrome. He did n
ot mention any evidence of cervical radiculopathy.  He did have some evidence of a periphera
l neuropathy. After the EMG he was then referred to me for potential carpal tunnel injection
s.  I felt it would be in his best interest to try carpal tunnel splints first.  He has trie
d them and unfortunately they did not help.  
 He describes his symptoms as burning, uncomfortable numbness.  The discomfort is fairly con
stant and not necessarily activity related.  He does also have significant stiffness in a lo
t of his joints including his wrists. 
 Treatments for these complaints have included previous carpal tunnel release in , as we
ll as the carpal tunnel splints. (PT, injections, medications, chiropractic, TENS unit, surg
eries, etc...)
 Imaging of the cervical spine, including an MRI were reviewed today in detail with the all
ent.  I did also personally review the EMG report from Dr. Acuña.
 
 Patient's medications, allergies, past medical, surgical, social and family histories were 
reviewed and updated as appropriate.
 
 Review of Systems
 The patient reported only the joint stiffness and numbness/dysesthesias as above.
 Objective: 
 Physical Exam 
 Nursing note and vitals reviewed.
 Constitutional: He is oriented to person, place, and time. He appears well-developed and we
ll-nourished. 
 HENT: 
 Head: Normocephalic and atraumatic. 
 Neck: No tracheal deviation present. 
 Cardiovascular: Normal rate and regular rhythm.  
 Pulmonary/Chest: Effort normal and breath sounds normal. No respiratory distress. 
 Lymphadenopathy: 
   He has no cervical adenopathy. 
 Neurological: He is alert and oriented to person, place, and time. He has normal strength. 
 
No cranial nerve deficit or sensory deficit. He displays no Babinski's sign on the right david
e. He displays no Babinski's sign on the left side. 
 Skin: Skin is warm, dry and intact. No abrasion, no bruising, no ecchymosis, no laceration 
and no rash noted. 
 Psychiatric: He has a normal mood and affect. His speech is normal and behavior is normal. 
Thought content normal. Cognition and memory are normal. 
 Musculoskeletal: Strength testing in bilateral upper extremities showed 5/5 strength with n
o focal weakness.  Range of motion testing of the cervical spine showed limitations in all p
lanes but was fairly pain free. Spurling sign was negative.  He did not have positive Tinel'
s or Phalen's sign.  
 
 Assessment: 
 
 1. Carpal tunnel syndrome  
 2. DDD cervical spine 
 3. Diabetes mellitus  
 4. Diabetic peripheral neuropathy  
 5. Paresthesia - it is unclear which of the above issues is causing his current symptoms.  
It is quite possibly a combination of all of the above. 
 
 Plan: 
 
 1.  I did review the EMG report by Dr. Nils Acuña.  Certainly, based on the numbers, th
e recurrent or residual carpal tunnel syndrome seems to be the greatest source of the patien
dora's current symptoms or at least a significant contributing source.  It has been suggested t
hat the patient may benefit from carpal tunnel injections and the patient does wish to pursu
e this option and consented to the procedure on both wrists today.
 
 2. Description of procedure: Bilateral carpal tunnel injections were performed using ultras
ound guidance.  
 
 After the patient gave consent for the procedure the area for the left wrist injection was 
located by direct ultrasound visualization.  Then the area was sterilized with chlorhexidine
 scrub and a sterile drape was applied. A sterile probe cover and sterile ultrasound gel was
 applied to the probe.  Then under direct ultrasound guidance a 27-gauge 1-1/2 inch needle w
as inserted down into the carpal tunnel just deep to the median nerve.  A total volume of 2 
mL including 1 mL of triamcinolone ( 40 milligrams per mL) and 1 mL of 1% lidocaine was then
 infused. Pictures were taken to document needle placement.  The needle was then removed and
 the area was cleaned and bandaged. The procedure was then repeated in the same manner on th
e right.  The patient tolerated the procedure well. There were no complications.  The patien
dora was instructed to ice the area for 15-20 minutes several times over the next few days and 
to watch for any signs of infection.
 
 3.  The above procedures may prove both therapeutic and diagnostic.  I will contact the pat
ient in a few weeks to see how he responded to the procedure and further plan of care will b
e determined at that time.
 
 Electronically signed by Ed Patel MD at 2013  6:24 AM PSTdocumented in this
 encounter
 
 Plan of Treatment
 Not on filedocumented as of this encounter
 
 Visit Diagnoses
 
 
+------------------------------------------------------------------------------------------+
| Diagnosis                                                                                |
+------------------------------------------------------------------------------------------+
|   Carpal tunnel syndrome - Primary                                                       |
 
+------------------------------------------------------------------------------------------+
|   DEGENERATIVE DISC DISEASE, CERVICAL SPINE  Degeneration of cervical intervertebral     |
| disc                                                                                     |
+------------------------------------------------------------------------------------------+
|   Diabetes mellitus (HCC)  Type II or unspecified type diabetes mellitus without mention |
|  of complication, not stated as uncontrolled                                             |
+------------------------------------------------------------------------------------------+
|   Diabetic peripheral neuropathy (HCC)  Type II or unspecified type diabetes mellitus    |
| with neurological manifestations, not stated as uncontrolled                             |
+------------------------------------------------------------------------------------------+
|   Paresthesia  Disturbance of skin sensation                                             |
+------------------------------------------------------------------------------------------+
 documented in this encounter

## 2020-06-03 NOTE — XMS
Encounter Summary
  Created on: 2020
 
 Isak Hilario
 : 30
 Sex: Male
 
 Demographics
 
 
+-----------------------+------------------------+
| Address               | [address]       |
|                       | [place]  [address]   |
+-----------------------+------------------------+
| Home Phone            | [phone]        |
+-----------------------+------------------------+
| Preferred Language    | Unknown                |
+-----------------------+------------------------+
| Marital Status        |                 |
+-----------------------+------------------------+
| [employer] Affiliation | NON                    |
+-----------------------+------------------------+
| Race                  | White                  |
+-----------------------+------------------------+
| Ethnic Group          | Not  or  |
+-----------------------+------------------------+
 
 
 Author
 
 
+--------------+-------------+
| Organization | Unknown     |
+--------------+-------------+
| Address      | Unknown     |
+--------------+-------------+
| Phone        | Unavailable |
+--------------+-------------+
 
 
 
 Support
 
 
+-------------------+--------------+-------------------+-----------------+
| Name              | Relationship | Address           | Phone           |
+-------------------+--------------+-------------------+-----------------+
| Jackie Hilario | ECON         | [address]      | [phone] |
|                   |              | [place], [place]   |                 |
|                   |              | [address]             |                 |
+-------------------+--------------+-------------------+-----------------+
 
 
 
 Care Team Providers
 
 
+-----------------------+------+-------------+
 
| Care Team Member Name | Role | Phone       |
+-----------------------+------+-------------+
 PCP  | Unavailable |
+-----------------------+------+-------------+
 
 
 
 Encounter Details
 
 
+--------+----------+------------+--------------------+-------------+
| Date   | Type     | Department | Care Team          | Description |
+--------+----------+------------+--------------------+-------------+
| / | Results  |            |   Other, Faculty   |             |
|    | Only     |            | [phone]       |             |
+--------+----------+------------+--------------------+-------------+
 
 
 
 Social History
 
 
+----------------+-------+-----------+--------+------+
| Tobacco Use    | Types | Packs/Day | Years  | Date |
|                |       |           | Used   |      |
+----------------+-------+-----------+--------+------+
| Never Assessed |       |           |        |      |
+----------------+-------+-----------+--------+------+
 
 
 
+------------------+---------------+
| Sex Assigned at  | Date Recorded |
| Birth            |               |
+------------------+---------------+
| Not on file      |               |
+------------------+---------------+
 
 
 
+----------------+-------------+-------------+
| Job Start Date | Occupation  | Industry    |
+----------------+-------------+-------------+
| Not on file    | Not on file | Not on file |
+----------------+-------------+-------------+
 
 
 
+----------------+--------------+------------+
| Travel History | Travel Start | Travel End |
+----------------+--------------+------------+
 
 
 
+-------------------------------------+
| No recent travel history available. |
+-------------------------------------+
 documented as of this encounter
 
 Plan of Treatment
 
 Not on filedocumented as of this encounter
 
 Procedures
 
 
+--------------------+--------+-------------+----------------------+----------------------+
| Procedure Name     | Priori | Date/Time   | Associated Diagnosis | Comments             |
|                    | ty     |             |                      |                      |
+--------------------+--------+-------------+----------------------+----------------------+
| X-RAY CHEST 2 VIEW | Priori | 1998  |                      |   Results for this   |
|                    | ty     |  4:40 PM    |                      | procedure are in the |
|                    |        | PDT         |                      |  results section.    |
+--------------------+--------+-------------+----------------------+----------------------+
 documented in this encounter
 
 Results
 CHEST 2 VIEW (1998  4:40 PM PDT)
 
+-----------+--------------------------+-----------+------------+--------------+
| Component | Value                    | Ref Range | Performed  | Pathologist  |
|           |                          |           | At         | Signature    |
+-----------+--------------------------+-----------+------------+--------------+
| CHEST, 2  | Radiologist 1: [facility]   |           |            |              |
| [facility]  | [facility]IL[facility] ELMORE M.D.TWO VIEW |           |            |              |
| STEREO    |  CHEST:   98.      |           |            |              |
|           |   Dictated:   98   |           |            |              |
|           | COMPARISON:   No         |           |            |              |
|           | comparison. FINDINGS:    |           |            |              |
|           |   The lungs are clear.   |           |            |              |
|           |   The heart is normal in |           |            |              |
|           |  size andconfiguration.  |           |            |              |
|           |   Atherosclerotic        |           |            |              |
|           | changes are seen in the  |           |            |              |
|           | tortuousaorta.   No      |           |            |              |
|           | effusion is evident.     |           |            |              |
|           |   Degenerative changes   |           |            |              |
|           | are seen in thethoracic  |           |            |              |
|           | spine with mild anterior |           |            |              |
|           |  wedging of several      |           |            |              |
|           | midthoracicvertebral     |           |            |              |
|           | bodies.  IMPRESSION: No  |           |            |              |
|           | evidence of an acute     |           |            |              |
|           | cardiopulmonary process. |           |            |              |
|           |   END OF IMPRESSION:     |           |            |              |
+-----------+--------------------------+-----------+------------+--------------+
 
 
 
+----------+
| Specimen |
+----------+
|          |
+----------+
 
 
 
+---------------------------------+--------------+
| Narrative                       | Performed At |
+---------------------------------+--------------+
|   Ordered by [facility]WA ASHLEY M.D.    |              |
 
+---------------------------------+--------------+
 
 
 
+----------------------+---------+--------------------+--------------+
| Performing           | Address | City/State/Zipcode | Phone Number |
| Organization         |         |                    |              |
+----------------------+---------+--------------------+--------------+
|   [facility] DEPARTMENT OF |         |                    |              |
|  RADIOLOGY           |         |                    |              |
+----------------------+---------+--------------------+--------------+
 documented in this encounter
 
 Visit Diagnoses
 Not on filedocumented in this encounter

## 2020-06-03 NOTE — XMS
Encounter Summary
  Created on: 2020
 
 Isak Hilario
 External Reference #: [id number]
 : 30
 Sex: Male
 
 Demographics
 
 
+-----------------------+------------------------+
| Address               | [address]       |
|                       | [place]  [address]   |
+-----------------------+------------------------+
| Home Phone            | [phone]        |
+-----------------------+------------------------+
| Preferred Language    | Unknown                |
+-----------------------+------------------------+
| Marital Status        |                 |
+-----------------------+------------------------+
| Sikh Affiliation | NON                    |
+-----------------------+------------------------+
| Race                  | White                  |
+-----------------------+------------------------+
| Ethnic Group          | Not  or  |
+-----------------------+------------------------+
 
 
 Author
 
 
+--------------+------------------------------+
| Author       | [facility] |
+--------------+------------------------------+
| Organization | [facility] |
+--------------+------------------------------+
| Address      | Unknown                      |
+--------------+------------------------------+
| Phone        | Unavailable                  |
+--------------+------------------------------+
 
 
 
 Support
 
 
+-------------------+--------------+-------------------+-----------------+
| Name              | Relationship | Address           | Phone           |
+-------------------+--------------+-------------------+-----------------+
| Jackie Hilario | ECON         | [address]      | [phone] |
|                   |              | [place], [place]   |                 |
|                   |              | [address]             |                 |
+-------------------+--------------+-------------------+-----------------+
 
 
 
 Care Team Providers
 
 
 
+-----------------------+------+-------------+
| Care Team Member Name | Role | Phone       |
+-----------------------+------+-------------+
 PCP  | Unavailable |
+-----------------------+------+-------------+
 
 
 
 Encounter Details
 
 
+--------+----------+----------------------+--------------------+-------------+
| Date   | Type     | Department           | Care Team          | Description |
+--------+----------+----------------------+--------------------+-------------+
| / | Results  |   [facility]  [address]  |   [facility]   |             |
|    | Only     | [address]  | [phone]       |             |
|        |          |  [place]        |                    |             |
|        |          | [address]           |                    |             |
|        |          | [phone]         |                    |             |
+--------+----------+----------------------+--------------------+-------------+
 
 
 
 Social History
 
 
+----------------+-------+-----------+--------+------+
| Tobacco Use    | Types | Packs/Day | Years  | Date |
|                |       |           | Used   |      |
+----------------+-------+-----------+--------+------+
| Never Assessed |       |           |        |      |
+----------------+-------+-----------+--------+------+
 
 
 
+------------------+---------------+
| Sex Assigned at  | Date Recorded |
| Birth            |               |
+------------------+---------------+
| Not on file      |               |
+------------------+---------------+
 
 
 
+----------------+-------------+-------------+
| Job Start Date | Occupation  | Industry    |
+----------------+-------------+-------------+
| Not on file    | Not on file | Not on file |
+----------------+-------------+-------------+
 
 
 
+----------------+--------------+------------+
| Travel History | Travel Start | Travel End |
+----------------+--------------+------------+
 
 
 
 
+-------------------------------------+
| No recent travel history available. |
+-------------------------------------+
 documented as of this encounter
 
 Plan of Treatment
 Not on filedocumented as of this encounter
 
 Procedures
 
 
+--------------------+--------+------------+----------------------+----------------------+
| Procedure Name     | Priori | Date/Time  | Associated Diagnosis | Comments             |
|                    | ty     |            |                      |                      |
+--------------------+--------+------------+----------------------+----------------------+
| SURGICAL PATHOLOGY | Routin | 1998 |                      |   Results for this   |
|                    | e      |            |                      | procedure are in the |
|                    |        |            |                      |  results section.    |
+--------------------+--------+------------+----------------------+----------------------+
 documented in this encounter
 
 Results
 SURGICAL PATHOLOGY (1998)
 
+-----------+--------------------------+-----------+-------------+--------------+
| Component | Value                    | Ref Range | Performed   | Pathologist  |
|           |                          |           | At          | Signature    |
+-----------+--------------------------+-----------+-------------+--------------+
| SURGICAL  | SOURCE OF SPECIMEN: SEE  |           | [facility]        |              |
| PATHOLOGY | RESULTS                  |           | DEPARTMENT  |              |
|           | Preliminary              |           | OF          |              |
|           | History:CLINICAL HISTORY |           | PATHOLOGY   |              |
|           |        Patient Age: 67   |           |             |              |
|           | year old man             |           |             |              |
|           | Patient History: New     |           |             |              |
|           | diagnosis of prostate    |           |             |              |
|           | cancer, clinician wishes |           |             |              |
|           |  torule out extension    |           |             |              |
|           | into seminal vesicles.   |           |             |              |
|           |       GROSS DESCRIPTION  |           |             |              |
|           |       Specimens          |           |             |              |
|           | received:   Four in      |           |             |              |
|           | formalin       #1 RIGHT  |           |             |              |
|           | PROXIMAL SEMINAL         |           |             |              |
|           | VESICLE: The specimen    |           |             |              |
|           | consists of              |           |             |              |
|           | twofilamentous fragments |           |             |              |
|           |  of tan-white, soft      |           |             |              |
|           | tissue measuring 1.4 cm. |           |             |              |
|           |  inaggregate length by   |           |             |              |
|           | less than 0.1 cm. in     |           |             |              |
|           | diameter. The specimen   |           |             |              |
|           | iswrapped and submitted  |           |             |              |
|           | in toto in cassette 1.   |           |             |              |
|           |       #2 LEFT PROXIMAL   |           |             |              |
|           | SEMINAL VESICLE: The     |           |             |              |
|           | specimen consists of     |           |             |              |
|           | onefilamentous fragment  |           |             |              |
|           | of tan-white, soft       |           |             |              |
|           | tissue measuring 2.0 cm. |           |             |              |
 
|           |  inlength by less than   |           |             |              |
|           | 0.1 cm. in diameter. The |           |             |              |
|           |  specimen is wrapped     |           |             |              |
|           | andsubmitted in toto in  |           |             |              |
|           | cassette 2.       #3     |           |             |              |
|           | RIGHT DISTAL SEMINAL     |           |             |              |
|           | VESICLE: The specimen    |           |             |              |
|           | consists of              |           |             |              |
|           | onefilamentous fragment  |           |             |              |
|           | of tan-white, soft       |           |             |              |
|           | tissue measuring 0.7 cm. |           |             |              |
|           |  inlength by less than   |           |             |              |
|           | 0.1 cm. in diameter. The |           |             |              |
|           |  specimen is wrapped     |           |             |              |
|           | andsubmitted in toto in  |           |             |              |
|           | cassette 3.       #4     |           |             |              |
|           | LEFT DISTAL SEMINAL      |           |             |              |
|           | VESICLE: The specimen    |           |             |              |
|           | consists of              |           |             |              |
|           | threefilamentous         |           |             |              |
|           | fragments of tan-white,  |           |             |              |
|           | soft tissue measuring    |           |             |              |
|           | 1.7 cm. inaggregate      |           |             |              |
|           | length by less than 0.1  |           |             |              |
|           | cm. in diameter. The     |           |             |              |
|           | specimen iswrapped and   |           |             |              |
|           | submitted in toto in     |           |             |              |
|           | cassette 4.       All    |           |             |              |
|           | tissue sections taken    |           |             |              |
|           | are submitted for        |           |             |              |
|           | microscopic              |           |             |              |
|           | evaluation.SML/KI:tg     |           |             |              |
|           |           FINAL          |           |             |              |
|           | DIAGNOSIS#1 RIGHT        |           |             |              |
|           | PROXIMAL SEMINAL         |           |             |              |
|           | VESICLE:   SEMINAL       |           |             |              |
|           | VESICLE WITH NO          |           |             |              |
|           | DIAGNOSTIC ABNORMALITY#2 |           |             |              |
|           |  LEFT PROXIMAL SEMINAL   |           |             |              |
|           | VESICLE:   PROSTATE WITH |           |             |              |
|           |  SINGLE FOCUS OF         |           |             |              |
|           | ADENOCARCINOMA,          |           |             |              |
|           | ALINE'S GRADE II/V,    |           |             |              |
|           |    INVOLVING 10% OF      |           |             |              |
|           | CORE; NO SEMINAL VESICLE |           |             |              |
|           |  PRESENT#3 RIGHT DISTAL  |           |             |              |
|           | SEMINAL VESICLE:         |           |             |              |
|           |   SEMINAL VESICLE WITH   |           |             |              |
|           | NO DIAGNOSTIC            |           |             |              |
|           | ABNORMALITY#4 LEFT       |           |             |              |
|           | DISTAL SEMINAL VESICLE:  |           |             |              |
|           |   SEMINAL VESICLE WITH   |           |             |              |
|           | NO DIAGNOSTIC            |           |             |              |
|           | ABNORMALITY       Case   |           |             |              |
|           | reviewed by: Yudi       |           |             |              |
|           | MS Arnaud IIICase        |           |             |              |
|           | staffed by: [facility]        |           |             |              |
|           | BILL BinghamAlso seen   |           |             |              |
|           | by: Surya Gonzalez      |           |             |              |
|           | M.D.T:98:catherine       My |           |             |              |
 
|           |  electronic signature    |           |             |              |
|           | indicates that I have    |           |             |              |
|           | personally reviewed      |           |             |              |
|           | alldiagnostic slides,    |           |             |              |
|           | the gross and/or         |           |             |              |
|           | microscopic portion of   |           |             |              |
|           | thisreport and           |           |             |              |
|           | formulated the final     |           |             |              |
|           | diagnosis.               |           |             |              |
+-----------+--------------------------+-----------+-------------+--------------+
 
 
 
+----------+
| Specimen |
+----------+
| Other    |
+----------+
 
 
 
+----------------------+------------------------+----------------------+--------------+
| Performing           | Address                | City/State/Zipcode   | Phone Number |
| Organization         |                        |                      |              |
+----------------------+------------------------+----------------------+--------------+
|   [facility] |   [address]  |   [place] [address] |              |
|  PATHOLOGY           | [address]                |                      |              |
+----------------------+------------------------+----------------------+--------------+
 documented in this encounter
 
 Visit Diagnoses
 Not on filedocumented in this encounter

## 2020-06-03 NOTE — XMS
Encounter Summary
  Created on: 2020
 
 Isak Hilario
 External Reference #: [id number]
 : 30
 Sex: Male
 
 Demographics
 
 
+-----------------------+----------------------+
| Address               | [address]         |
|                       | [place]  [address] |
+-----------------------+----------------------+
| Home Phone            | [phone]      |
+-----------------------+----------------------+
| Preferred Language    | Unknown              |
+-----------------------+----------------------+
| Marital Status        |               |
+-----------------------+----------------------+
| Yarsanism Affiliation | Unknown              |
+-----------------------+----------------------+
| Race                  | Unknown              |
+-----------------------+----------------------+
| Ethnic Group          | Unknown              |
+-----------------------+----------------------+
 
 
 Author
 
 
+--------------+--------------------------------------------+
| Author       | [facility]  |
|              | [facility] [place]                                |
+--------------+--------------------------------------------+
| Organization | [facility]  |
|              | [facility] [place]                                |
+--------------+--------------------------------------------+
| Address      | Unknown                                    |
+--------------+--------------------------------------------+
| Phone        | Unavailable                                |
+--------------+--------------------------------------------+
 
 
 
 Support
 
 
+-------------+--------------+---------+-----------------+
| Name        | Relationship | Address | Phone           |
+-------------+--------------+---------+-----------------+
| Alessandra Saxena | ECON         | Unknown | [phone] |
+-------------+--------------+---------+-----------------+
 
 
 
 Care Team Providers
 
 
 
+-----------------------+------+-----------------+
| Care Team Member Name | Role | Phone           |
+-----------------------+------+-----------------+
| Bari Ha MD | PCP  | [phone] |
+-----------------------+------+-----------------+
 
 
 
 Reason for Visit
 
 
+-----------+----------+
| Reason    | Comments |
+-----------+----------+
| Follow-up |          |
+-----------+----------+
 
 
 
 Encounter Details
 
 
+--------+-----------+----------------------+----------------------+-------------+
| Date   | Type      | Department           | Care Team            | Description |
+--------+-----------+----------------------+----------------------+-------------+
| 10/13/ | Telephone |   [facility]      |   Kyree Smith PA  | Follow-up   |
| 2016   |           | SLEEP DISORDER  [address]  |  [address]     |             |
|        |           | [address]      | [place]      |             |
|        |           | [place] [address] | [address]  [phone]  |             |
|        |           |   [phone]       |  [phone] (Fax)  |             |
+--------+-----------+----------------------+----------------------+-------------+
 
 
 
 Social History
 
 
+--------------+-------+-----------+--------+------+
| Tobacco Use  | Types | Packs/Day | Years  | Date |
|              |       |           | Used   |      |
+--------------+-------+-----------+--------+------+
| Never Smoker |       |           |        |      |
+--------------+-------+-----------+--------+------+
 
 
 
+-------------+-------------+---------+----------+
| Alcohol Use | Drinks/Week | oz/Week | Comments |
+-------------+-------------+---------+----------+
| Not Asked   |             |         |          |
+-------------+-------------+---------+----------+
 
 
 
+------------------+---------------+
| Sex Assigned at  | Date Recorded |
| Birth            |               |
+------------------+---------------+
 
| Not on file      |               |
+------------------+---------------+
 
 
 
+----------------+-------------+-------------+
| Job Start Date | Occupation  | Industry    |
+----------------+-------------+-------------+
| Not on file    | Not on file | Not on file |
+----------------+-------------+-------------+
 
 
 
+----------------+--------------+------------+
| Travel History | Travel Start | Travel End |
+----------------+--------------+------------+
 
 
 
+-------------------------------------+
| No recent travel history available. |
+-------------------------------------+
 documented as of this encounter
 
 Plan of Treatment
 Not on filedocumented as of this encounter
 
 Visit Diagnoses
 Not on filedocumented in this encounter

## 2020-06-03 NOTE — XMS
Encounter Summary
  Created on: 2020
 
 Isak Hilario
 External Reference #: [id number]
 : 30
 Sex: Male
 
 Demographics
 
 
+-----------------------+------------------------+
| Address               | [address]       |
|                       | [place]  [address]   |
+-----------------------+------------------------+
| Home Phone            | [phone]        |
+-----------------------+------------------------+
| Preferred Language    | Unknown                |
+-----------------------+------------------------+
| Marital Status        |                 |
+-----------------------+------------------------+
| [employer] Affiliation | NON                    |
+-----------------------+------------------------+
| Race                  | White                  |
+-----------------------+------------------------+
| Ethnic Group          | Not  or  |
+-----------------------+------------------------+
 
 
 Author
 
 
+--------------+------------------------------+
| Author       | [facility] |
+--------------+------------------------------+
| Organization | [facility] |
+--------------+------------------------------+
| Address      | Unknown                      |
+--------------+------------------------------+
| Phone        | Unavailable                  |
+--------------+------------------------------+
 
 
 
 Support
 
 
+-------------------+--------------+-------------------+-----------------+
| Name              | Relationship | Address           | Phone           |
+-------------------+--------------+-------------------+-----------------+
| Jackie Hilario | ECON         | [address]      | [phone] |
|                   |              | [place], [place]   |                 |
|                   |              | [address]             |                 |
+-------------------+--------------+-------------------+-----------------+
 
 
 
 Care Team Providers
 
 
 
+-----------------------+------+-------------+
| Care Team Member Name | Role | Phone       |
+-----------------------+------+-------------+
 PCP  | Unavailable |
+-----------------------+------+-------------+
 
 
 
 Encounter Details
 
 
+--------+----------+----------------------+-----------+-------------+
| Date   | Type     | Department           | Care Team | Description |
+--------+----------+----------------------+-----------+-------------+
| / | Results  |   Registration  [address] |           |             |
|    | Only     |  [address] |           |             |
|        |          |  [address]  Mailcode: [id number] |           |             |
|        |          |   [place], [place]       |           |             |
|        |          | [address]           |           |             |
|        |          | [phone]         |           |             |
+--------+----------+----------------------+-----------+-------------+
 
 
 
 Social History
 
 
+----------------+-------+-----------+--------+------+
| Tobacco Use    | Types | Packs/Day | Years  | Date |
|                |       |           | Used   |      |
+----------------+-------+-----------+--------+------+
| Never Assessed |       |           |        |      |
+----------------+-------+-----------+--------+------+
 
 
 
+------------------+---------------+
| Sex Assigned at  | Date Recorded |
| Birth            |               |
+------------------+---------------+
| Not on file      |               |
+------------------+---------------+
 
 
 
+----------------+-------------+-------------+
| Job Start Date | Occupation  | Industry    |
+----------------+-------------+-------------+
| Not on file    | Not on file | Not on file |
+----------------+-------------+-------------+
 
 
 
+----------------+--------------+------------+
| Travel History | Travel Start | Travel End |
+----------------+--------------+------------+
 
 
 
 
+-------------------------------------+
| No recent travel history available. |
+-------------------------------------+
 documented as of this encounter
 
 Plan of Treatment
 Not on filedocumented as of this encounter
 
 Procedures
 
 
+----------------------+--------+-------------+----------------------+----------------------
+
| Procedure Name       | Priori | Date/Time   | Associated Diagnosis | Comments             
|
|                      | ty     |             |                      |                      
|
+----------------------+--------+-------------+----------------------+----------------------
+
| TRANSFUSION MEDICINE | Routin | 1998  |                      |   Results for this   
|
|  TESTS               | e      |  8:22 AM    |                      | procedure are in the 
|
|                      |        | PDT         |                      |  results section.    
|
+----------------------+--------+-------------+----------------------+----------------------
+
| CHEMISTRY TESTS 4    | Routin | 1998  |                      |   Results for this   
|
|                      | e      |  5:15 PM    |                      | procedure are in the 
|
|                      |        | PDT         |                      |  results section.    
|
+----------------------+--------+-------------+----------------------+----------------------
+
| CBC TESTS 2          | Routin | 1998  |                      |   Results for this   
|
|                      | e      |  5:15 PM    |                      | procedure are in the 
|
|                      |        | PDT         |                      |  results section.    
|
+----------------------+--------+-------------+----------------------+----------------------
+
| COAGULATION TESTS 2  | Routin | 1998  |                      |   Results for this   
|
|                      | e      |  5:15 PM    |                      | procedure are in the 
|
|                      |        | PDT         |                      |  results section.    
|
+----------------------+--------+-------------+----------------------+----------------------
+
 documented in this encounter
 
 Results
 TRANSFUSION MEDICINE TESTS (1998  8:22 AM PDT)
 
+-----------+----------+-----------+------------+--------------+
| Component | Value    | Ref Range | Performed  | Pathologist  |
|           |          |           | At         | Signature    |
 
+-----------+----------+-----------+------------+--------------+
| ABO GROUP | O        |           |            |              |
+-----------+----------+-----------+------------+--------------+
| RH TYPE   | POS      |           |            |              |
+-----------+----------+-----------+------------+--------------+
| ANTIBODY  | NEGATIVE |           |            |              |
| SCREEN    |          |           |            |              |
+-----------+----------+-----------+------------+--------------+
 
 
 
+----------+
| Specimen |
+----------+
|          |
+----------+
 
 
 
+----------------------+------------------------+----------------------+--------------+
| Performing           | Address                | City/State/Zipcode   | Phone Number |
| Organization         |                        |                      |              |
+----------------------+------------------------+----------------------+--------------+
|   [facility] |   [address]  |   [place] [address] |              |
|  PATHOLOGY           | [address]                |                      |              |
+----------------------+------------------------+----------------------+--------------+
 COAGULATION TESTS 2 (1998  5:15 PM PDT)
 
+-------------+--------------+-----------+------------+--------------+
| Component   | Value        | Ref Range | Performed  | Pathologist  |
|             |              |           | At         | Signature    |
+-------------+--------------+-----------+------------+--------------+
| PROTHROMBIN |        11.9  | SECONDS   |            |              |
|  TIME       |              |           |            |              |
+-------------+--------------+-----------+------------+--------------+
| PROTIME     |         1.   | SECONDS   |            |              |
| RATIO       |              |           |            |              |
+-------------+--------------+-----------+------------+--------------+
| PROTHROMBIN |         1.02 | INR       |            |              |
|  INR        |              |           |            |              |
+-------------+--------------+-----------+------------+--------------+
| APTT        |        28.6  | SECONDS   |            |              |
+-------------+--------------+-----------+------------+--------------+
 
 
 
+----------+
| Specimen |
+----------+
|          |
+----------+
 
 
 
+----------------------+------------------------+----------------------+--------------+
| Performing           | Address                | City/State/Zipcode   | Phone Number |
| Organization         |                        |                      |              |
+----------------------+------------------------+----------------------+--------------+
|   [facility] |   [address]  |   [place] [address] |              |
|  PATHOLOGY           | [address]                |                      |              |
 
+----------------------+------------------------+----------------------+--------------+
 CBC TESTS 2 (1998  5:15 PM PDT)
 
+-------------+-----------------+-----------+------------+--------------+
| Component   | Value           | Ref Range | Performed  | Pathologist  |
|             |                 |           | At         | Signature    |
+-------------+-----------------+-----------+------------+--------------+
| WHITE CELL  |         6.      | K/CU MM   |            |              |
| COUNT       |                 |           |            |              |
+-------------+-----------------+-----------+------------+--------------+
| RED CELL    |         4.9     | M/CU MM   |            |              |
| COUNT       |                 |           |            |              |
+-------------+-----------------+-----------+------------+--------------+
| HEMOGLOBIN  |        14.4     | GM/DL     |            |              |
+-------------+-----------------+-----------+------------+--------------+
| HEMATOCRIT  |        44.      | %         |            |              |
+-------------+-----------------+-----------+------------+--------------+
| MCV         |        89.8     | FL        |            |              |
+-------------+-----------------+-----------+------------+--------------+
| MCH         |        29.3     | PG        |            |              |
+-------------+-----------------+-----------+------------+--------------+
| MCHC        |        32.7 (L) | GM/DL     |            |              |
+-------------+-----------------+-----------+------------+--------------+
| RDW         |        13.5     | %         |            |              |
+-------------+-----------------+-----------+------------+--------------+
| PLATELET    |       238.      | K/CU MM   |            |              |
| COUNT       |                 |           |            |              |
+-------------+-----------------+-----------+------------+--------------+
| MPV         |         8.3     | FL        |            |              |
+-------------+-----------------+-----------+------------+--------------+
 
 
 
+----------+
| Specimen |
+----------+
|          |
+----------+
 
 
 
+----------------------+------------------------+----------------------+--------------+
| Performing           | Address                | City/State/Zipcode   | Phone Number |
| Organization         |                        |                      |              |
+----------------------+------------------------+----------------------+--------------+
|   [facility] |   [address]  |   [place] [address] |              |
|  PATHOLOGY           | [address]                |                      |              |
+----------------------+------------------------+----------------------+--------------+
 CHEMISTRY TESTS 4 (1998  5:15 PM PDT)
 
+-------------+-------------+-----------+------------+--------------+
| Component   | Value       | Ref Range | Performed  | Pathologist  |
|             |             |           | At         | Signature    |
+-------------+-------------+-----------+------------+--------------+
| SODIUM,     |       141.  | mmol/l    |            |              |
| PLASMA      |             |           |            |              |
| (LAB)       |             |           |            |              |
+-------------+-------------+-----------+------------+--------------+
| POTASSIUM,  |         3.5 | mmol/l    |            |              |
| PLASMA      |             |           |            |              |
 
| (LAB)       |             |           |            |              |
+-------------+-------------+-----------+------------+--------------+
| CHLORIDE,   |       108.  | mmol/l    |            |              |
| PLASMA      |             |           |            |              |
| (LAB)       |             |           |            |              |
+-------------+-------------+-----------+------------+--------------+
| TOTAL CO2,  |        26.  | mmol/l    |            |              |
| PLASMA      |             |           |            |              |
| (LAB)       |             |           |            |              |
+-------------+-------------+-----------+------------+--------------+
| BUN, PLASMA |        14.  | mg/dL     |            |              |
|  (LAB)      |             |           |            |              |
+-------------+-------------+-----------+------------+--------------+
| CREATININE  |         1.1 | mg/dL     |            |              |
| PLASMA      |             |           |            |              |
| (LAB)       |             |           |            |              |
+-------------+-------------+-----------+------------+--------------+
 
 
 
+----------+
| Specimen |
+----------+
|          |
+----------+
 
 
 
+----------------------+------------------------+----------------------+--------------+
| Performing           | Address                | City/State/Zipcode   | Phone Number |
| Organization         |                        |                      |              |
+----------------------+------------------------+----------------------+--------------+
|   [facility] |   [address]  |   [place] [address] |              |
|  PATHOLOGY           | [address]                |                      |              |
+----------------------+------------------------+----------------------+--------------+
 documented in this encounter
 
 Visit Diagnoses
 Not on filedocumented in this encounter

## 2020-06-03 NOTE — XMS
Encounter Summary
  Created on: 2020
 
 Isak Hilario
 External Reference #: [id number]
 : 30
 Sex: Male
 
 Demographics
 
 
+-----------------------+------------------------+
| Address               | [address]       |
|                       | [place]  [address]   |
+-----------------------+------------------------+
| Home Phone            | [phone]        |
+-----------------------+------------------------+
| Preferred Language    | Unknown                |
+-----------------------+------------------------+
| Marital Status        |                 |
+-----------------------+------------------------+
| Rastafarian Affiliation | NON                    |
+-----------------------+------------------------+
| Race                  | White                  |
+-----------------------+------------------------+
| Ethnic Group          | Not  or  |
+-----------------------+------------------------+
 
 
 Author
 
 
+--------------+------------------------------+
| Author       | [facility] |
+--------------+------------------------------+
| Organization | [facility] |
+--------------+------------------------------+
| Address      | Unknown                      |
+--------------+------------------------------+
| Phone        | Unavailable                  |
+--------------+------------------------------+
 
 
 
 Support
 
 
+-------------------+--------------+-------------------+-----------------+
| Name              | Relationship | Address           | Phone           |
+-------------------+--------------+-------------------+-----------------+
| Jackie Hilario | ECON         | [address]      | [phone] |
|                   |              | [place], [place]   |                 |
|                   |              | [address]             |                 |
+-------------------+--------------+-------------------+-----------------+
 
 
 
 Care Team Providers
 
 
 
+-----------------------+------+-------------+
| Care Team Member Name | Role | Phone       |
+-----------------------+------+-------------+
 PCP  | Unavailable |
+-----------------------+------+-------------+
 
 
 
 Encounter Details
 
 
+--------+----------+----------------------+--------------------+-------------+
| Date   | Type     | Department           | Care Team          | Description |
+--------+----------+----------------------+--------------------+-------------+
| 05/05/ | Results  |   LAB [facility]ORE  [address]  |   [facility]   |             |
|    | Only     | [address]  | [phone]       |             |
|        |          |  [place]        |                    |             |
|        |          | [address]           |                    |             |
|        |          | [phone]         |                    |             |
+--------+----------+----------------------+--------------------+-------------+
 
 
 
 Social History
 
 
+----------------+-------+-----------+--------+------+
| Tobacco Use    | Types | Packs/Day | Years  | Date |
|                |       |           | Used   |      |
+----------------+-------+-----------+--------+------+
| Never Assessed |       |           |        |      |
+----------------+-------+-----------+--------+------+
 
 
 
+------------------+---------------+
| Sex Assigned at  | Date Recorded |
| Birth            |               |
+------------------+---------------+
| Not on file      |               |
+------------------+---------------+
 
 
 
+----------------+-------------+-------------+
| Job Start Date | Occupation  | Industry    |
+----------------+-------------+-------------+
| Not on file    | Not on file | Not on file |
+----------------+-------------+-------------+
 
 
 
+----------------+--------------+------------+
| Travel History | Travel Start | Travel End |
+----------------+--------------+------------+
 
 
 
 
+-------------------------------------+
| No recent travel history available. |
+-------------------------------------+
 documented as of this encounter
 
 Plan of Treatment
 Not on filedocumented as of this encounter
 
 Procedures
 
 
+--------------------+--------+------------+----------------------+----------------------+
| Procedure Name     | Priori | Date/Time  | Associated Diagnosis | Comments             |
|                    | ty     |            |                      |                      |
+--------------------+--------+------------+----------------------+----------------------+
| SURGICAL PATHOLOGY | Routin | 1998 |                      |   Results for this   |
|                    | e      |            |                      | procedure are in the |
|                    |        |            |                      |  results section.    |
+--------------------+--------+------------+----------------------+----------------------+
 documented in this encounter
 
 Results
 SURGICAL PATHOLOGY (1998)
 
+-----------+--------------------------+-----------+-------------+--------------+
| Component | Value                    | Ref Range | Performed   | Pathologist  |
|           |                          |           | At          | Signature    |
+-----------+--------------------------+-----------+-------------+--------------+
| SURGICAL  | SOURCE OF SPECIMEN: SEE  |           | [facility]        |              |
| PATHOLOGY | RESULTS                  |           | DEPARTMENT  |              |
|           | Preliminary              |           | OF          |              |
|           | History:GROSS            |           | PATHOLOGY   |              |
|           | DESCRIPTIONReceived from |           |             |              |
|           |  [facility] [place]  |           |             |              |
|           | [place], [place], are six  |           |             |              |
|           | H&E-stainedslides        |           |             |              |
|           | labeled with the         |           |             |              |
|           | patient's name           |           |             |              |
|           | "Sulaiman". The           |           |             |              |
|           | correspondingPathology   |           |             |              |
|           | report is labeled with   |           |             |              |
|           | the patient's name and   |           |             |              |
|           | dated 98.           |           |             |              |
|           | MICROSCOPIC              |           |             |              |
|           | DESCRIPTIONProstate,     |           |             |              |
|           | Needle Core Biopsies,    |           |             |              |
|           | A-F: Low power           |           |             |              |
|           | demonstrates foci        |           |             |              |
|           | ofsmall, single glands   |           |             |              |
|           | compressed together with |           |             |              |
|           |  areas of cribriforming  |           |             |              |
|           | andareas of infiltrating |           |             |              |
|           |  fused glands. On high   |           |             |              |
|           | power, the malignant     |           |             |              |
|           | cellshave an increased   |           |             |              |
|           | nuclear to cytoplasmic   |           |             |              |
|           | ratio, prominent         |           |             |              |
|           | nucleoli,mildly          |           |             |              |
|           | irregular nuclear        |           |             |              |
|           | membranes, and           |           |             |              |
 
|           | pleomorphism. There      |           |             |              |
|           | areeosinophilic          |           |             |              |
|           | crystalloids in some of  |           |             |              |
|           | the malignant glands. In |           |             |              |
|           |  slide E,there is        |           |             |              |
|           | infiltration of a nerve  |           |             |              |
|           | by malignant glands. The |           |             |              |
|           |  remainder ofthe tissue  |           |             |              |
|           | demonstrates mild gland  |           |             |              |
|           | atrophy.       FINAL     |           |             |              |
|           | DIAGNOSISPROSTATE, LEFT  |           |             |              |
|           | BASE, NEEDLE CORE BIOPSY |           |             |              |
|           |  (A):   PROSTATE         |           |             |              |
|           | ADENOCARCINOMA,          |           |             |              |
|           | ALINE'S GRADE 3+4/5+5, |           |             |              |
|           |  COMPRISING 30% OF       |           |             |              |
|           |   BIOPSYPROSTATE, LEFT   |           |             |              |
|           | MID, NEEDLE CORE BIOPSY  |           |             |              |
|           | (B):   PROSTATE          |           |             |              |
|           | ADENOCARCINOMA,          |           |             |              |
|           | ALINE'S GRADE 3+4/5+5, |           |             |              |
|           |  COMPRISING 60% OF       |           |             |              |
|           |   BIOPSYPROSTATE, LEFT   |           |             |              |
|           | APEX, NEEDLE CORE BIOPSY |           |             |              |
|           |  (C):   BENIGN PROSTATE  |           |             |              |
|           | TISSUEPROSTATE, RIGHT    |           |             |              |
|           | BASE, NEEDLE CORE BIOPSY |           |             |              |
|           |  (D):   PROSTATE         |           |             |              |
|           | ADENOCARCINOMA,          |           |             |              |
|           | ALINE'S GRADE 3+4/5+5, |           |             |              |
|           |  COMPRISING 50% OF       |           |             |              |
|           |   BIOPSYPROSTATE, RIGHT  |           |             |              |
|           | MID, NEEDLE CORE BIOPSY  |           |             |              |
|           | (E):   PROSTATE          |           |             |              |
|           | ADENOCARCINOMA,          |           |             |              |
|           | ALINE'S GRADE 3+4/5+5, |           |             |              |
|           |  COMPRISING 10% OF       |           |             |              |
|           |   BIOPSY, INFILTRATING   |           |             |              |
|           | NERVEPROSTATE, RIGHT     |           |             |              |
|           | APEX, NEEDLE CORE BIOPSY |           |             |              |
|           |  (F):   BENIGN PROSTATE  |           |             |              |
|           | TISSUE(outside slides)   |           |             |              |
|           |       Case reviewed by:  |           |             |              |
|           |   Bhavana Leigh M.D.Case   |           |             |              |
|           | staffed by:   Harley      |           |             |              |
|           | Dex                 |           |             |              |
|           | M.D.T:98/fSix slides |           |             |              |
|           |  are returned with the   |           |             |              |
|           | report.       My         |           |             |              |
|           | electronic signature     |           |             |              |
|           | indicates that I have    |           |             |              |
|           | personally reviewed      |           |             |              |
|           | alldiagnostic slides,    |           |             |              |
|           | the gross and/or         |           |             |              |
|           | microscopic portion of   |           |             |              |
|           | thisreport and           |           |             |              |
|           | formulated the final     |           |             |              |
|           | diagnosis.               |           |             |              |
+-----------+--------------------------+-----------+-------------+--------------+
 
 
 
 
+----------+
| Specimen |
+----------+
| Other    |
+----------+
 
 
 
+----------------------+------------------------+----------------------+--------------+
| Performing           | Address                | City/State/Zipcode   | Phone Number |
| Organization         |                        |                      |              |
+----------------------+------------------------+----------------------+--------------+
|   [facility] |   [address]  |   [place] [address] |              |
|  PATHOLOGY           | [place]                |                      |              |
+----------------------+------------------------+----------------------+--------------+
 documented in this encounter
 
 Visit Diagnoses
 Not on filedocumented in this encounter

## 2020-06-03 NOTE — XMS
Encounter Summary
  Created on: 2020
 
 Isak Hilario
 External Reference #: [id number]
 : 30
 Sex: Male
 
 Demographics
 
 
+-----------------------+----------------------+
| Address               | [address]         |
|                       | [place]  [address] |
+-----------------------+----------------------+
| Home Phone            | [phone]      |
+-----------------------+----------------------+
| Preferred Language    | Unknown              |
+-----------------------+----------------------+
| Marital Status        |               |
+-----------------------+----------------------+
| Pentecostalism Affiliation | Unknown              |
+-----------------------+----------------------+
| Race                  | Unknown              |
+-----------------------+----------------------+
| Ethnic Group          | Unknown              |
+-----------------------+----------------------+
 
 
 Author
 
 
+--------------+--------------------------------------------+
| Author       | [facility]  |
|              | [facility] [place]                                |
+--------------+--------------------------------------------+
| Organization | [facility]  |
|              | [facility] [place]                                |
+--------------+--------------------------------------------+
| Address      | Unknown                                    |
+--------------+--------------------------------------------+
| Phone        | Unavailable                                |
+--------------+--------------------------------------------+
 
 
 
 Support
 
 
+-------------+--------------+---------+-----------------+
| Name        | Relationship | Address | Phone           |
+-------------+--------------+---------+-----------------+
| Alessandra Saxena | ECON         | Unknown | [phone] |
+-------------+--------------+---------+-----------------+
 
 
 
 Care Team Providers
 
 
 
+-----------------------+------+-----------------+
| Care Team Member Name | Role | Phone           |
+-----------------------+------+-----------------+
| Bari Ha MD | PCP  | [phone] |
+-----------------------+------+-----------------+
 
 
 
 Reason for Visit
 
 
+----------------------+----------+
| Reason               | Comments |
+----------------------+----------+
| Coordination Of Care |          |
+----------------------+----------+
 
 
 
 Encounter Details
 
 
+--------+-----------+----------------------+--------------------+----------------------+
| Date   | Type      | Department           | Care Team          | Description          |
+--------+-----------+----------------------+--------------------+----------------------+
| / | Telephone |   [facility]  |   Peter Hess  | Coordination Of Care |
| 2020   |           | SURGERY  [address]   | [address]    |                      |
|        |           | [address]  [place], [place]  | [address]  [facility]    |                      |
|        |           | [address]           | [place] [address]    |                      |
|        |           | [phone]         | [phone]       |                      |
|        |           |                      | [phone] (Fax) |                      |
+--------+-----------+----------------------+--------------------+----------------------+
 
 
 
 Social History
 
 
+--------------+-------+-----------+--------+------+
| Tobacco Use  | Types | Packs/Day | Years  | Date |
|              |       |           | Used   |      |
+--------------+-------+-----------+--------+------+
| Never Smoker |       |           |        |      |
+--------------+-------+-----------+--------+------+
 
 
 
+---------------------+------+---+----------+
| Smokeless Tobacco:  | Chew |   | Quit:    |
| Former User         |      |   | 19 |
|                     |      |   | 85       |
+---------------------+------+---+----------+
 
 
 
+-------------+-------------+---------+----------+
| Alcohol Use | Drinks/Week | oz/Week | Comments |
+-------------+-------------+---------+----------+
 
| Not Asked   |             |         |          |
+-------------+-------------+---------+----------+
 
 
 
+------------------+---------------+
| Sex Assigned at  | Date Recorded |
| Birth            |               |
+------------------+---------------+
| Not on file      |               |
+------------------+---------------+
 
 
 
+----------------+-------------+-------------+
| Job Start Date | Occupation  | Industry    |
+----------------+-------------+-------------+
| Not on file    | Not on file | Not on file |
+----------------+-------------+-------------+
 
 
 
+----------------+--------------+------------+
| Travel History | Travel Start | Travel End |
+----------------+--------------+------------+
 
 
 
+-------------------------------------+
| No recent travel history available. |
+-------------------------------------+
 documented as of this encounter
 
 Plan of Treatment
 Not on filedocumented as of this encounter
 
 Visit Diagnoses
 Not on filedocumented in this encounter

## 2020-06-03 NOTE — XMS
Encounter Summary
  Created on: 2020
 
 Isak Hilario
 External Reference #: [id number]
 : 30
 Sex: Male
 
 Demographics
 
 
+-----------------------+----------------------+
| Address               | [address]         |
|                       | [place]  [address] |
+-----------------------+----------------------+
| Home Phone            | [phone]      |
+-----------------------+----------------------+
| Preferred Language    | Unknown              |
+-----------------------+----------------------+
| Marital Status        |               |
+-----------------------+----------------------+
| Faith Affiliation | Unknown              |
+-----------------------+----------------------+
| Race                  | Unknown              |
+-----------------------+----------------------+
| Ethnic Group          | Unknown              |
+-----------------------+----------------------+
 
 
 Author
 
 
+--------------+--------------------------------------------+
| Author       | [facility]  |
|              | and [place]                                |
+--------------+--------------------------------------------+
| Organization | [facility]  |
|              | and [place]                                |
+--------------+--------------------------------------------+
| Address      | Unknown                                    |
+--------------+--------------------------------------------+
| Phone        | Unavailable                                |
+--------------+--------------------------------------------+
 
 
 
 Support
 
 
+-------------+--------------+---------+-----------------+
| Name        | Relationship | Address | Phone           |
+-------------+--------------+---------+-----------------+
| Alessandra Saxena | ECON         | Unknown | [phone] |
+-------------+--------------+---------+-----------------+
 
 
 
 Care Team Providers
 
 
 
+-----------------------+------+-----------------+
| Care Team Member Name | Role | Phone           |
+-----------------------+------+-----------------+
| Bari Ha MD | PCP  | [phone] |
+-----------------------+------+-----------------+
 
 
 
 Reason for Visit
 
 
+------------+----------------------+
| Reason     | Comments             |
+------------+----------------------+
| Wrist Pain | Bilateral wrist pain |
+------------+----------------------+
| Numbness   |                      |
+------------+----------------------+
 
 
 
 Encounter Details
 
 
+--------+---------+---------------------+----------------------+--------------------+
| Date   | Type    | Department          | Care Team            | Description        |
+--------+---------+---------------------+----------------------+--------------------+
| / | Office  |   [facility]         |   Ed Patel   | Carpal tunnel      |
|    | Visit   | PHYSIATRY  [address]    | T, MD  [address]  | syndrome (Primary  |
|        |         | [address]   | [place]  [place]  | Dx); DEGENERATIVE  |
|        |         | [place], [place]     | [address]  [phone]  | DISC DISEASE,      |
|        |         | [address]          |  [phone] (Fax)  | CERVICAL SPINE;    |
|        |         | [phone]        |                      | Diabetes mellitus  |
|        |         |                     |                      | (HCC); Diabetic    |
|        |         |                     |                      | peripheral         |
|        |         |                     |                      | neuropathy ([facility]);  |
|        |         |                     |                      | Paresthesia        |
+--------+---------+---------------------+----------------------+--------------------+
 
 
 
 Social History
 
 
+--------------+-------+-----------+--------+------+
| Tobacco Use  | Types | Packs/Day | Years  | Date |
|              |       |           | Used   |      |
+--------------+-------+-----------+--------+------+
| Never Smoker |       |           |        |      |
+--------------+-------+-----------+--------+------+
 
 
 
+-------------+-------------+---------+----------+
| Alcohol Use | Drinks/Week | oz/Week | Comments |
+-------------+-------------+---------+----------+
| Not Asked   |             |         |          |
+-------------+-------------+---------+----------+
 
 
 
 
+------------------+---------------+
| Sex Assigned at  | Date Recorded |
| Birth            |               |
+------------------+---------------+
| Not on file      |               |
+------------------+---------------+
 
 
 
+----------------+-------------+-------------+
| Job Start Date | Occupation  | Industry    |
+----------------+-------------+-------------+
| Not on file    | Not on file | Not on file |
+----------------+-------------+-------------+
 
 
 
+----------------+--------------+------------+
| Travel History | Travel Start | Travel End |
+----------------+--------------+------------+
 
 
 
+-------------------------------------+
| No recent travel history available. |
+-------------------------------------+
 documented as of this encounter
 
 Last Filed Vital Signs
 
 
+-------------------+------------------+----------------------+----------+
| Vital Sign        | Reading          | Time Taken           | Comments |
+-------------------+------------------+----------------------+----------+
| Blood Pressure    | 128/85           | 2013 10:52 AM  |          |
|                   |                  | PST                  |          |
+-------------------+------------------+----------------------+----------+
| Pulse             | 69               | 2013 10:52 AM  |          |
|                   |                  | PST                  |          |
+-------------------+------------------+----------------------+----------+
| Temperature       | -                | -                    |          |
+-------------------+------------------+----------------------+----------+
| Respiratory Rate  | -                | -                    |          |
+-------------------+------------------+----------------------+----------+
| Oxygen Saturation | -                | -                    |          |
+-------------------+------------------+----------------------+----------+
| Inhaled Oxygen    | -                | -                    |          |
| Concentration     |                  |                      |          |
+-------------------+------------------+----------------------+----------+
| Weight            | 95.3 kg (210 lb) | 2013 10:52 AM  |          |
|                   |                  | PST                  |          |
+-------------------+------------------+----------------------+----------+
| Height            | 165.1 cm (5' 5") | 2013 10:52 AM  |          |
|                   |                  | PST                  |          |
+-------------------+------------------+----------------------+----------+
| Body Mass Index   | 34.95            | 2013 10:52 AM  |          |
|                   |                  | PST                  |          |
 
+-------------------+------------------+----------------------+----------+
 documented in this encounter
 
 Patient Instructions
 Patient Instructions Ed Patel MD - 2013  6:20 AM PSTIce the area as needed
 for 15-20 minutes several times over the next few days.  Watch for signs of infection.  Bryan
l with questions or concerns.
 Electronically signed by Ed Patel MD at 2013  6:20 AM PST
 documented in this encounter
 
 Progress Notes
 Ed Patel MD - 2013 10:57 AM PSTFormatting of this note might be different 
from the original.
 Subjective: 
  
 Patient ID: Isak Hilario is a 82 y.o. male.
 Chief Complaint 
 Patient presents with 
   Wrist Pain 
   Bilateral wrist pain 
   Numbness 
 
 HPI The patient is being seen today in follow-up for complaints of bilateral wrist pain as 
well as numbness in the first three digits on both hands. The patient had prior carpal tunne
l releases in .  He reports that for several years after he had improvement of his sympt
oms.  The symptoms then started to reoccur approximately 3 years ago.  He was having signifi
cant neck pain at that time as well and x-rays and an MRI were ordered.  He was found to hav
e significant DDD and some cervical stenosis.  He then had EMG and nerve conduction studies 
by Dr. Smith Acuña which showed severe residual or recurrent carpal tunnel syndrome. He did n
ot mention any evidence of cervical radiculopathy.  He did have some evidence of a periphera
l neuropathy. After the EMG he was then referred to me for potential carpal tunnel injection
s.  I felt it would be in his best interest to try carpal tunnel splints first.  He has trie
d them and unfortunately they did not help.  
 He describes his symptoms as burning, uncomfortable numbness.  The discomfort is fairly con
stant and not necessarily activity related.  He does also have significant stiffness in a lo
t of his joints including his wrists. 
 Treatments for these complaints have included previous carpal tunnel release in , as we
ll as the carpal tunnel splints. (PT, injections, medications, chiropractic, TENS unit, surg
eries, etc...)
 Imaging of the cervical spine, including an MRI were reviewed today in detail with the all
ent.  I did also personally review the EMG report from Dr. Acuña.
 
 Patient's medications, allergies, past medical, surgical, social and family histories were 
reviewed and updated as appropriate.
 
 Review of Systems
 The patient reported only the joint stiffness and numbness/dysesthesias as above.
 Objective: 
 Physical Exam 
 Nursing note and vitals reviewed.
 Constitutional: He is oriented to person, place, and time. He appears well-developed and we
ll-nourished. 
 HENT: 
 Head: Normocephalic and atraumatic. 
 Neck: No tracheal deviation present. 
 Cardiovascular: Normal rate and regular rhythm.  
 Pulmonary/Chest: Effort normal and breath sounds normal. No respiratory distress. 
 Lymphadenopathy: 
   He has no cervical adenopathy. 
 Neurological: He is alert and oriented to person, place, and time. He has normal strength. 
 
No cranial nerve deficit or sensory deficit. He displays no Babinski's sign on the right david
e. He displays no Babinski's sign on the left side. 
 Skin: Skin is warm, dry and intact. No abrasion, no bruising, no ecchymosis, no laceration 
and no rash noted. 
 Psychiatric: He has a normal mood and affect. His speech is normal and behavior is normal. 
Thought content normal. Cognition and memory are normal. 
 Musculoskeletal: Strength testing in bilateral upper extremities showed 5/5 strength with n
o focal weakness.  Range of motion testing of the cervical spine showed limitations in all p
lanes but was fairly pain free. Spurling sign was negative.  He did not have positive Tinel'
s or Phalen's sign.  
 
 Assessment: 
 
 1. Carpal tunnel syndrome  
 2. DDD cervical spine 
 3. Diabetes mellitus  
 4. Diabetic peripheral neuropathy  
 5. Paresthesia - it is unclear which of the above issues is causing his current symptoms.  
It is quite possibly a combination of all of the above. 
 
 Plan: 
 
 1.  I did review the EMG report by Dr. Nils Acuña.  Certainly, based on the numbers, th
e recurrent or residual carpal tunnel syndrome seems to be the greatest source of the patien
dora's current symptoms or at least a significant contributing source.  It has been suggested t
hat the patient may benefit from carpal tunnel injections and the patient does wish to pursu
e this option and consented to the procedure on both wrists today.
 
 2. Description of procedure: Bilateral carpal tunnel injections were performed using ultras
ound guidance.  
 
 After the patient gave consent for the procedure the area for the left wrist injection was 
located by direct ultrasound visualization.  Then the area was sterilized with chlorhexidine
 scrub and a sterile drape was applied. A sterile probe cover and sterile ultrasound gel was
 applied to the probe.  Then under direct ultrasound guidance a 27-gauge 1-1/2 inch needle w
as inserted down into the carpal tunnel just deep to the median nerve.  A total volume of 2 
mL including 1 mL of triamcinolone ( 40 milligrams per mL) and 1 mL of 1% lidocaine was then
 infused. Pictures were taken to document needle placement.  The needle was then removed and
 the area was cleaned and bandaged. The procedure was then repeated in the same manner on th
e right.  The patient tolerated the procedure well. There were no complications.  The patien
dora was instructed to ice the area for 15-20 minutes several times over the next few days and 
to watch for any signs of infection.
 
 3.  The above procedures may prove both therapeutic and diagnostic.  I will contact the pat
ient in a few weeks to see how he responded to the procedure and further plan of care will b
e determined at that time.
 
 Electronically signed by Ed Patel MD at 2013  6:24 AM PSTdocumented in this
 encounter
 
 Plan of Treatment
 Not on filedocumented as of this encounter
 
 Visit Diagnoses
 
 
+------------------------------------------------------------------------------------------+
| Diagnosis                                                                                |
+------------------------------------------------------------------------------------------+
|   Carpal tunnel syndrome - Primary                                                       |
 
+------------------------------------------------------------------------------------------+
|   DEGENERATIVE DISC DISEASE, CERVICAL SPINE  Degeneration of cervical intervertebral     |
| disc                                                                                     |
+------------------------------------------------------------------------------------------+
|   Diabetes mellitus (HCC)  Type II or unspecified type diabetes mellitus without mention |
|  of complication, not stated as uncontrolled                                             |
+------------------------------------------------------------------------------------------+
|   Diabetic peripheral neuropathy (HCC)  Type II or unspecified type diabetes mellitus    |
| with neurological manifestations, not stated as uncontrolled                             |
+------------------------------------------------------------------------------------------+
|   Paresthesia  Disturbance of skin sensation                                             |
+------------------------------------------------------------------------------------------+
 documented in this encounter

## 2020-06-03 NOTE — XMS
Clinical Summary
  Created on: 2020
 
 Isak Hilario
 : 30
 Sex: Male
 
 Demographics
 
 
+-----------------------+------------------------+
| Address               | [address]       |
|                       | [place]  [address]   |
+-----------------------+------------------------+
| Home Phone            | [phone]        |
+-----------------------+------------------------+
| Preferred Language    | Unknown                |
+-----------------------+------------------------+
| Marital Status        |                 |
+-----------------------+------------------------+
| Temple Affiliation | NON                    |
+-----------------------+------------------------+
| Race                  | White                  |
+-----------------------+------------------------+
| Ethnic Group          | Not  or  |
+-----------------------+------------------------+
 
 
 Author
 
 
+--------------+-------------+
| Organization | Unknown     |
+--------------+-------------+
| Address      | Unknown     |
+--------------+-------------+
| Phone        | Unavailable |
+--------------+-------------+
 
 
 
 Support
 
 
+-------------------+--------------+-------------------+-----------------+
| Name              | Relationship | Address           | Phone           |
+-------------------+--------------+-------------------+-----------------+
| Jackie Hilario | ECON         | [address]      | [phone] |
|                   |              | [place], [place]   |                 |
|                   |              | [address]             |                 |
+-------------------+--------------+-------------------+-----------------+
 
 
 
 Care Team Providers
 
 
+-----------------------+------+-------------+
 
| Care Team Member Name | Role | Phone       |
+-----------------------+------+-------------+
 PCP  | Unavailable |
+-----------------------+------+-------------+
 
 
 
 Source Comments
 SAMMIE is fully live on both [facility] and [facility]
 
 Allergies
 No Known Allergies
 
 Medications
 Not on file
 
 Active Problems
 Not on file
 
 Social History
 
 
+----------------+-------+-----------+--------+------+
| Tobacco Use    | Types | Packs/Day | Years  | Date |
|                |       |           | Used   |      |
+----------------+-------+-----------+--------+------+
| Never Assessed |       |           |        |      |
+----------------+-------+-----------+--------+------+
 
 
 
+------------------+---------------+
| Sex Assigned at  | Date Recorded |
| Birth            |               |
+------------------+---------------+
| Not on file      |               |
+------------------+---------------+
 
 
 
+----------------+-------------+-------------+
| Job Start Date | Occupation  | Industry    |
+----------------+-------------+-------------+
| Not on file    | Not on file | Not on file |
+----------------+-------------+-------------+
 
 
 
+----------------+--------------+------------+
| Travel History | Travel Start | Travel End |
+----------------+--------------+------------+
 
 
 
+-------------------------------------+
| No recent travel history available. |
+-------------------------------------+
 
 
 
 
 Last Filed Vital Signs
 Not on file
 
 Plan of Treatment
 
 
+----------------------+-----------+-----------+----------+
| Health Maintenance   | Due Date  | Last Done | Comments |
+----------------------+-----------+-----------+----------+
| Pneumococcal         |  |           |          |
| vaccination (  | 5         |           |          |
| - PCV13)             |           |           |          |
+----------------------+-----------+-----------+----------+
| Influenza (Flu)      | 10/01/201 |           |          |
| vaccination (#1)     | 9         |           |          |
+----------------------+-----------+-----------+----------+
 
 
 
 Results
 Not on filefrom Last 3 Months

## 2020-06-03 NOTE — XMS
Encounter Summary
  Created on: 2020
 
 Isak Hilario
 External Reference #: [id number]
 : 30
 Sex: Male
 
 Demographics
 
 
+-----------------------+----------------------+
| Address               | [address]         |
|                       | [place]  [address] |
+-----------------------+----------------------+
| Home Phone            | [phone]      |
+-----------------------+----------------------+
| Preferred Language    | Unknown              |
+-----------------------+----------------------+
| Marital Status        |               |
+-----------------------+----------------------+
| Episcopalian Affiliation | Unknown              |
+-----------------------+----------------------+
| Race                  | Unknown              |
+-----------------------+----------------------+
| Ethnic Group          | Unknown              |
+-----------------------+----------------------+
 
 
 Author
 
 
+--------------+--------------------------------------------+
| Author       | [facility]  |
|              | and [place]                                |
+--------------+--------------------------------------------+
| Organization | [facility]  |
|              | and [place]                                |
+--------------+--------------------------------------------+
| Address      | Unknown                                    |
+--------------+--------------------------------------------+
| Phone        | Unavailable                                |
+--------------+--------------------------------------------+
 
 
 
 Support
 
 
+-------------+--------------+---------+-----------------+
| Name        | Relationship | Address | Phone           |
+-------------+--------------+---------+-----------------+
| Alessandra Saxena | ECON         | Unknown | [phone] |
+-------------+--------------+---------+-----------------+
 
 
 
 Care Team Providers
 
 
 
+-----------------------+------+-----------------+
| Care Team Member Name | Role | Phone           |
+-----------------------+------+-----------------+
| Bari Ha MD | PCP  | [phone] |
+-----------------------+------+-----------------+
 
 
 
 Reason for Visit
 
 
+----------------------+----------+
| Reason               | Comments |
+----------------------+----------+
| Coordination Of Care |          |
+----------------------+----------+
 
 
 
 Encounter Details
 
 
+--------+-----------+----------------------+--------------------+----------------------+
| Date   | Type      | Department           | Care Team          | Description          |
+--------+-----------+----------------------+--------------------+----------------------+
| / | Telephone |   [facility]  |   Peter Hess  | Coordination Of Care |
| 2020   |           | SURGERY  [address]   | [address]    |                      |
|        |           | [address]  [place], [place]  | [address]  [facility]    |                      |
|        |           | [address]           | [place] [address]    |                      |
|        |           | [phone]         | [phone]       |                      |
|        |           |                      | [phone] (Fax) |                      |
+--------+-----------+----------------------+--------------------+----------------------+
 
 
 
 Social History
 
 
+--------------+-------+-----------+--------+------+
| Tobacco Use  | Types | Packs/Day | Years  | Date |
|              |       |           | Used   |      |
+--------------+-------+-----------+--------+------+
| Never Smoker |       |           |        |      |
+--------------+-------+-----------+--------+------+
 
 
 
+---------------------+------+---+----------+
| Smokeless Tobacco:  | Chew |   | Quit:    |
| Former User         |      |   | 19 |
|                     |      |   | 85       |
+---------------------+------+---+----------+
 
 
 
+-------------+-------------+---------+----------+
| Alcohol Use | Drinks/Week | oz/Week | Comments |
+-------------+-------------+---------+----------+
 
| Not Asked   |             |         |          |
+-------------+-------------+---------+----------+
 
 
 
+------------------+---------------+
| Sex Assigned at  | Date Recorded |
| Birth            |               |
+------------------+---------------+
| Not on file      |               |
+------------------+---------------+
 
 
 
+----------------+-------------+-------------+
| Job Start Date | Occupation  | Industry    |
+----------------+-------------+-------------+
| Not on file    | Not on file | Not on file |
+----------------+-------------+-------------+
 
 
 
+----------------+--------------+------------+
| Travel History | Travel Start | Travel End |
+----------------+--------------+------------+
 
 
 
+-------------------------------------+
| No recent travel history available. |
+-------------------------------------+
 documented as of this encounter
 
 Plan of Treatment
 Not on filedocumented as of this encounter
 
 Visit Diagnoses
 Not on filedocumented in this encounter

## 2020-06-03 NOTE — XMS
Encounter Summary
  Created on: 2020
 
 Isak Hilario
 External Reference #: [id number]
 : 30
 Sex: Male
 
 Demographics
 
 
+-----------------------+----------------------+
| Address               | [address]         |
|                       | [place]  [address] |
+-----------------------+----------------------+
| Home Phone            | [phone]      |
+-----------------------+----------------------+
| Preferred Language    | Unknown              |
+-----------------------+----------------------+
| Marital Status        |               |
+-----------------------+----------------------+
| Gnosticist Affiliation | Unknown              |
+-----------------------+----------------------+
| Race                  | Unknown              |
+-----------------------+----------------------+
| Ethnic Group          | Unknown              |
+-----------------------+----------------------+
 
 
 Author
 
 
+--------------+--------------------------------------------+
| Author       | [facility]  |
|              | [facility] [place]                                |
+--------------+--------------------------------------------+
| Organization | [facility]  |
|              | [facility] [place]                                |
+--------------+--------------------------------------------+
| Address      | Unknown                                    |
+--------------+--------------------------------------------+
| Phone        | Unavailable                                |
+--------------+--------------------------------------------+
 
 
 
 Support
 
 
+-------------+--------------+---------+-----------------+
| Name        | Relationship | Address | Phone           |
+-------------+--------------+---------+-----------------+
| Alessandra Saxena | ECON         | Unknown | [phone] |
+-------------+--------------+---------+-----------------+
 
 
 
 Care Team Providers
 
 
 
+-----------------------+------+-----------------+
| Care Team Member Name | Role | Phone           |
+-----------------------+------+-----------------+
| Bari Ha MD | PCP  | [phone] |
+-----------------------+------+-----------------+
 
 
 
 Reason for Visit
 
 
+---------------+------------------+
| Reason        | Comments         |
+---------------+------------------+
| Carpal Tunnel | wants injections |
+---------------+------------------+
 
 
 
 Encounter Details
 
 
+--------+---------+---------------------+----------------------+--------------------+
| Date   | Type    | Department          | Care Team            | Description        |
+--------+---------+---------------------+----------------------+--------------------+
| / | Office  |   [facility]         |   Ed Patel   | Carpal tunnel      |
|    | Visit   | PHYSIATRY  [address]    | T, MD  [address]  | syndrome (Primary  |
|        |         | [address]   | [place]  [place]  | Dx)                |
|        |         | [place], [place]     | [address]  [phone]  |                    |
|        |         | [address]          |  [phone] (Fax)  |                    |
|        |         | [phone]        |                      |                    |
+--------+---------+---------------------+----------------------+--------------------+
 
 
 
 Social History
 
 
+--------------+-------+-----------+--------+------+
| Tobacco Use  | Types | Packs/Day | Years  | Date |
|              |       |           | Used   |      |
+--------------+-------+-----------+--------+------+
| Never Smoker |       |           |        |      |
+--------------+-------+-----------+--------+------+
 
 
 
+-------------+-------------+---------+----------+
| Alcohol Use | Drinks/Week | oz/Week | Comments |
+-------------+-------------+---------+----------+
| Not Asked   |             |         |          |
+-------------+-------------+---------+----------+
 
 
 
+------------------+---------------+
| Sex Assigned at  | Date Recorded |
| Birth            |               |
 
+------------------+---------------+
| Not on file      |               |
+------------------+---------------+
 
 
 
+----------------+-------------+-------------+
| Job Start Date | Occupation  | Industry    |
+----------------+-------------+-------------+
| Not on file    | Not on file | Not on file |
+----------------+-------------+-------------+
 
 
 
+----------------+--------------+------------+
| Travel History | Travel Start | Travel End |
+----------------+--------------+------------+
 
 
 
+-------------------------------------+
| No recent travel history available. |
+-------------------------------------+
 documented as of this encounter
 
 Last Filed Vital Signs
 
 
+-------------------+------------------+----------------------+----------+
| Vital Sign        | Reading          | Time Taken           | Comments |
+-------------------+------------------+----------------------+----------+
| Blood Pressure    | 147/74           | 2013 11:18 AM  |          |
|                   |                  | PST                  |          |
+-------------------+------------------+----------------------+----------+
| Pulse             | 85               | 2013 11:18 AM  |          |
|                   |                  | PST                  |          |
+-------------------+------------------+----------------------+----------+
| Temperature       | -                | -                    |          |
+-------------------+------------------+----------------------+----------+
| Respiratory Rate  | 14               | 2013 11:18 AM  |          |
|                   |                  | PST                  |          |
+-------------------+------------------+----------------------+----------+
| Oxygen Saturation | -                | -                    |          |
+-------------------+------------------+----------------------+----------+
| Inhaled Oxygen    | -                | -                    |          |
| Concentration     |                  |                      |          |
+-------------------+------------------+----------------------+----------+
| Weight            | 93 kg (205 lb)   | 2013 11:18 AM  |          |
|                   |                  | PST                  |          |
+-------------------+------------------+----------------------+----------+
| Height            | 170.2 cm (5' 7") | 2013 11:18 AM  |          |
|                   |                  | PST                  |          |
+-------------------+------------------+----------------------+----------+
| Body Mass Index   | 32.11            | 2013 11:18 AM  |          |
|                   |                  | PST                  |          |
+-------------------+------------------+----------------------+----------+
 documented in this encounter
 
 Progress Notes
 Ed Patel MD - 2013  1:41 PM PSTFormatting of this note might be different 
 
from the original.
 Subjective: 
  
 Patient ID: Isak Hilario is a 82 y.o. male.
 Chief Complaint 
 Patient presents with 
   Carpal Tunnel 
   wants injections 
 
 HPI The patient is being seen today in follow-up for complaints of bilateral wrist pain as 
well as numbness in the first three digits on both hands. The patient had prior carpal tunne
l releases in .  He reports that for several years after he had improvement of his sympt
oms.  The symptoms then started to reoccur approximately 3-4 years ago.  He was having signi
ficant neck pain at that time as well and x-rays and an MRI were ordered.  He was found to h
ave significant DDD and some cervical stenosis.  He then had EMG and nerve conduction studie
s by Dr. Smith Acuña which showed severe residual or recurrent carpal tunnel syndrome. He did
 not mention any evidence of cervical radiculopathy.  He did have some evidence of a periphe
ral neuropathy. After the EMG he was then referred to me for potential carpal tunnel injecti
ons.  I felt it would be in his best interest to try carpal tunnel splints first.  He did tr
y them and unfortunately they did not help.  He did receive carpal tunnel injections and his
 symptoms improved for several months.  Overall he was happy with the results and wishes to 
repeat the injections again today.
 He describes his symptoms as burning, uncomfortable numbness.  The discomfort is fairly con
stant and not necessarily activity related.  He does also have significant stiffness in a lo
t of his joints including his wrists. 
 
 Imaging of the cervical spine, including an MRI were reviewed today.  I did also personally
 review the EMG report from Dr. Acuña.
 
 IMPRESSION:
 
 1. Carpal tunnel syndrome  
 2. DDD cervical spine 
 3. Diabetes mellitus  
 4. Diabetic peripheral neuropathy  
 
 PLAN: A review of the EMG report by Dr. Nils Acuña and his response to the prior inject
ions would suggest to me that the recurrent or residual carpal tunnel syndrome seems to be t
he greatest source of the patient's current symptoms or at least a significant contributing 
source. The patient has benefited from carpal tunnel injections previously and the patient d
oes wish to pursue this option and consented to the procedure on both wrists today.  Potenti
al risks and alternative treatments were discussed in detail.
 
 Description of procedure: Bilateral carpal tunnel injections were performed using ultrasoun
d guidance.  
 
 After the patient gave consent for the procedure the area for the right wrist injection was
 located by direct ultrasound visualization.  Then the area was sterilized with chlorhexidin
e scrub and a sterile drape was applied. A sterile probe cover and sterile ultrasound gel wa
s applied to the probe.  Then under direct ultrasound guidance a 27-gauge 1-1/2 inch needle 
was inserted down into the carpal tunnel just deep to the median nerve.  A total volume of 2
 mL including 1 mL of triamcinolone ( 40 milligrams per mL) and 1 mL of 1% lidocaine was the
n infused. Pictures were taken to document needle placement.  The needle was then removed an
d the area was cleaned and bandaged. The procedure was then repeated in the same manner on t
he left.  The patient tolerated the procedure well. There were no complications.  The patien
t was instructed to ice the area for 15-20 minutes several times over the next few days and 
to watch for any signs of infection.
 
 The patient may be a candidate at some point for carpal tunnel release and I would be happy
 to provide a referral if he wishes to pursue that option.
 
 
 Electronically signed by Ed Patel MD at 2013  8:25 AM PSTdocumented in this
 encounter
 
 Plan of Treatment
 Not on filedocumented as of this encounter
 
 Visit Diagnoses
 
 
+------------------------------------+
| Diagnosis                          |
+------------------------------------+
|   Carpal tunnel syndrome - Primary |
+------------------------------------+
 documented in this encounter

## 2020-06-03 NOTE — XMS
Encounter Summary
  Created on: 2020
 
 Isak Hilario
 External Reference #: [id number]
 : 30
 Sex: Male
 
 Demographics
 
 
+-----------------------+------------------------+
| Address               | [address]       |
|                       | [place]  [address]   |
+-----------------------+------------------------+
| Home Phone            | [phone]        |
+-----------------------+------------------------+
| Preferred Language    | Unknown                |
+-----------------------+------------------------+
| Marital Status        |                 |
+-----------------------+------------------------+
| Nondenominational Affiliation | NON                    |
+-----------------------+------------------------+
| Race                  | White                  |
+-----------------------+------------------------+
| Ethnic Group          | Not  or  |
+-----------------------+------------------------+
 
 
 Author
 
 
+--------------+------------------------------+
| Author       | [facility] |
+--------------+------------------------------+
| Organization | [facility] |
+--------------+------------------------------+
| Address      | Unknown                      |
+--------------+------------------------------+
| Phone        | Unavailable                  |
+--------------+------------------------------+
 
 
 
 Support
 
 
+-------------------+--------------+-------------------+-----------------+
| Name              | Relationship | Address           | Phone           |
+-------------------+--------------+-------------------+-----------------+
| Jackie Hilario | ECON         | [address]      | [phone] |
|                   |              | [place], [place]   |                 |
|                   |              | [address]             |                 |
+-------------------+--------------+-------------------+-----------------+
 
 
 
 Care Team Providers
 
 
 
+-----------------------+------+-------------+
| Care Team Member Name | Role | Phone       |
+-----------------------+------+-------------+
 PCP  | Unavailable |
+-----------------------+------+-------------+
 
 
 
 Encounter Details
 
 
+--------+----------+----------------------+--------------------+-------------+
| Date   | Type     | Department           | Care Team          | Description |
+--------+----------+----------------------+--------------------+-------------+
| / | Results  |   [facility]  [address]  |   [facility]   |             |
|    | Only     | [address]  | [phone]       |             |
|        |          |  [place]        |                    |             |
|        |          | [address]           |                    |             |
|        |          | [phone]         |                    |             |
+--------+----------+----------------------+--------------------+-------------+
 
 
 
 Social History
 
 
+----------------+-------+-----------+--------+------+
| Tobacco Use    | Types | Packs/Day | Years  | Date |
|                |       |           | Used   |      |
+----------------+-------+-----------+--------+------+
| Never Assessed |       |           |        |      |
+----------------+-------+-----------+--------+------+
 
 
 
+------------------+---------------+
| Sex Assigned at  | Date Recorded |
| Birth            |               |
+------------------+---------------+
| Not on file      |               |
+------------------+---------------+
 
 
 
+----------------+-------------+-------------+
| Job Start Date | Occupation  | Industry    |
+----------------+-------------+-------------+
| Not on file    | Not on file | Not on file |
+----------------+-------------+-------------+
 
 
 
+----------------+--------------+------------+
| Travel History | Travel Start | Travel End |
+----------------+--------------+------------+
 
 
 
 
+-------------------------------------+
| No recent travel history available. |
+-------------------------------------+
 documented as of this encounter
 
 Plan of Treatment
 Not on filedocumented as of this encounter
 
 Procedures
 
 
+--------------------+--------+------------+----------------------+----------------------+
| Procedure Name     | Priori | Date/Time  | Associated Diagnosis | Comments             |
|                    | ty     |            |                      |                      |
+--------------------+--------+------------+----------------------+----------------------+
| SURGICAL PATHOLOGY | Routin | 1998 |                      |   Results for this   |
|                    | e      |            |                      | procedure are in the |
|                    |        |            |                      |  results section.    |
+--------------------+--------+------------+----------------------+----------------------+
 documented in this encounter
 
 Results
 SURGICAL PATHOLOGY (1998)
 
+-----------+--------------------------+-----------+-------------+--------------+
| Component | Value                    | Ref Range | Performed   | Pathologist  |
|           |                          |           | At          | Signature    |
+-----------+--------------------------+-----------+-------------+--------------+
| SURGICAL  | SOURCE OF SPECIMEN: SEE  |           | [facility]        |              |
| PATHOLOGY | RESULTS                  |           | DEPARTMENT  |              |
|           | Preliminary              |           | OF          |              |
|           | History:CLINICAL HISTORY |           | PATHOLOGY   |              |
|           |        Patient Age: 67   |           |             |              |
|           | year old man             |           |             |              |
|           | Patient History: New     |           |             |              |
|           | diagnosis of prostate    |           |             |              |
|           | cancer, clinician wishes |           |             |              |
|           |  torule out extension    |           |             |              |
|           | into seminal vesicles.   |           |             |              |
|           |       GROSS DESCRIPTION  |           |             |              |
|           |       Specimens          |           |             |              |
|           | received:   Four in      |           |             |              |
|           | formalin       #1 RIGHT  |           |             |              |
|           | PROXIMAL SEMINAL         |           |             |              |
|           | VESICLE: The specimen    |           |             |              |
|           | consists of              |           |             |              |
|           | twofilamentous fragments |           |             |              |
|           |  of tan-white, soft      |           |             |              |
|           | tissue measuring 1.4 cm. |           |             |              |
|           |  inaggregate length by   |           |             |              |
|           | less than 0.1 cm. in     |           |             |              |
|           | diameter. The specimen   |           |             |              |
|           | iswrapped and submitted  |           |             |              |
|           | in toto in cassette 1.   |           |             |              |
|           |       #2 LEFT PROXIMAL   |           |             |              |
|           | SEMINAL VESICLE: The     |           |             |              |
|           | specimen consists of     |           |             |              |
|           | onefilamentous fragment  |           |             |              |
|           | of tan-white, soft       |           |             |              |
|           | tissue measuring 2.0 cm. |           |             |              |
 
|           |  inlength by less than   |           |             |              |
|           | 0.1 cm. in diameter. The |           |             |              |
|           |  specimen is wrapped     |           |             |              |
|           | andsubmitted in toto in  |           |             |              |
|           | cassette 2.       #3     |           |             |              |
|           | RIGHT DISTAL SEMINAL     |           |             |              |
|           | VESICLE: The specimen    |           |             |              |
|           | consists of              |           |             |              |
|           | onefilamentous fragment  |           |             |              |
|           | of tan-white, soft       |           |             |              |
|           | tissue measuring 0.7 cm. |           |             |              |
|           |  inlength by less than   |           |             |              |
|           | 0.1 cm. in diameter. The |           |             |              |
|           |  specimen is wrapped     |           |             |              |
|           | andsubmitted in toto in  |           |             |              |
|           | cassette 3.       #4     |           |             |              |
|           | LEFT DISTAL SEMINAL      |           |             |              |
|           | VESICLE: The specimen    |           |             |              |
|           | consists of              |           |             |              |
|           | threefilamentous         |           |             |              |
|           | fragments of tan-white,  |           |             |              |
|           | soft tissue measuring    |           |             |              |
|           | 1.7 cm. inaggregate      |           |             |              |
|           | length by less than 0.1  |           |             |              |
|           | cm. in diameter. The     |           |             |              |
|           | specimen iswrapped and   |           |             |              |
|           | submitted in toto in     |           |             |              |
|           | cassette 4.       All    |           |             |              |
|           | tissue sections taken    |           |             |              |
|           | are submitted for        |           |             |              |
|           | microscopic              |           |             |              |
|           | evaluation.SML/KI:tg     |           |             |              |
|           |           FINAL          |           |             |              |
|           | DIAGNOSIS#1 RIGHT        |           |             |              |
|           | PROXIMAL SEMINAL         |           |             |              |
|           | VESICLE:   SEMINAL       |           |             |              |
|           | VESICLE WITH NO          |           |             |              |
|           | DIAGNOSTIC ABNORMALITY#2 |           |             |              |
|           |  LEFT PROXIMAL SEMINAL   |           |             |              |
|           | VESICLE:   PROSTATE WITH |           |             |              |
|           |  SINGLE FOCUS OF         |           |             |              |
|           | ADENOCARCINOMA,          |           |             |              |
|           | ALINE'S GRADE II/V,    |           |             |              |
|           |    INVOLVING 10% OF      |           |             |              |
|           | CORE; NO SEMINAL VESICLE |           |             |              |
|           |  PRESENT#3 RIGHT DISTAL  |           |             |              |
|           | SEMINAL VESICLE:         |           |             |              |
|           |   SEMINAL VESICLE WITH   |           |             |              |
|           | NO DIAGNOSTIC            |           |             |              |
|           | ABNORMALITY#4 LEFT       |           |             |              |
|           | DISTAL SEMINAL VESICLE:  |           |             |              |
|           |   SEMINAL VESICLE WITH   |           |             |              |
|           | NO DIAGNOSTIC            |           |             |              |
|           | ABNORMALITY       Case   |           |             |              |
|           | reviewed by: Yudi       |           |             |              |
|           | MS Arnaud IIICase        |           |             |              |
|           | staffed by: Mitra        |           |             |              |
|           | BILL BinghamAlso seen   |           |             |              |
|           | by: Surya Gonzalez      |           |             |              |
|           | M.D.T:98:catherine       My |           |             |              |
 
|           |  electronic signature    |           |             |              |
|           | indicates that I have    |           |             |              |
|           | personally reviewed      |           |             |              |
|           | alldiagnostic slides,    |           |             |              |
|           | the gross and/or         |           |             |              |
|           | microscopic portion of   |           |             |              |
|           | thisreport and           |           |             |              |
|           | formulated the final     |           |             |              |
|           | diagnosis.               |           |             |              |
+-----------+--------------------------+-----------+-------------+--------------+
 
 
 
+----------+
| Specimen |
+----------+
| Other    |
+----------+
 
 
 
+----------------------+------------------------+----------------------+--------------+
| Performing           | Address                | City/State/Zipcode   | Phone Number |
| Organization         |                        |                      |              |
+----------------------+------------------------+----------------------+--------------+
|   [facility] |   [address]  |   [place] [address] |              |
|  PATHOLOGY           | [address]                |                      |              |
+----------------------+------------------------+----------------------+--------------+
 documented in this encounter
 
 Visit Diagnoses
 Not on filedocumented in this encounter

## 2020-06-03 NOTE — NUR
SPOKE WITH DR FLEMING ABOUT PARAMETERS FOR CALLING HIM TONIGHT ABOUT PT'S HEART
RATE. HE WANTS TO BE NOTIFIED IF PT IS SYMPTOMATIC/UNCOMFORTABLE. HE ALSO
CALLED A LITTLE WHILE AGO TO MAKE SURE WE GIVE THE FULL ORDER OF POTASSIUM.
NOTIFIED NEO WESLEY.

## 2020-06-03 NOTE — XMS
Encounter Summary
  Created on: 2020
 
 Isak Hilario
 : 30
 Sex: Male
 
 Demographics
 
 
+-----------------------+------------------------+
| Address               | [address]       |
|                       | [place]  [address]   |
+-----------------------+------------------------+
| Home Phone            | [phone]        |
+-----------------------+------------------------+
| Preferred Language    | Unknown                |
+-----------------------+------------------------+
| Marital Status        |                 |
+-----------------------+------------------------+
| Episcopal Affiliation | NON                    |
+-----------------------+------------------------+
| Race                  | White                  |
+-----------------------+------------------------+
| Ethnic Group          | Not  or  |
+-----------------------+------------------------+
 
 
 Author
 
 
+--------------+-------------+
| Organization | Unknown     |
+--------------+-------------+
| Address      | Unknown     |
+--------------+-------------+
| Phone        | Unavailable |
+--------------+-------------+
 
 
 
 Support
 
 
+-------------------+--------------+-------------------+-----------------+
| Name              | Relationship | Address           | Phone           |
+-------------------+--------------+-------------------+-----------------+
| Jackie Hilario | ECON         | [address]      | [phone] |
|                   |              | [place], [place]   |                 |
|                   |              | [address]             |                 |
+-------------------+--------------+-------------------+-----------------+
 
 
 
 Care Team Providers
 
 
+-----------------------+------+-------------+
 
| Care Team Member Name | Role | Phone       |
+-----------------------+------+-------------+
 PCP  | Unavailable |
+-----------------------+------+-------------+
 
 
 
 Encounter Details
 
 
+--------+----------+------------+--------------------+-------------+
| Date   | Type     | Department | Care Team          | Description |
+--------+----------+------------+--------------------+-------------+
| / | Results  |            |   Other, Faculty   |             |
|    | Only     |            | [phone]       |             |
+--------+----------+------------+--------------------+-------------+
 
 
 
 Social History
 
 
+----------------+-------+-----------+--------+------+
| Tobacco Use    | Types | Packs/Day | Years  | Date |
|                |       |           | Used   |      |
+----------------+-------+-----------+--------+------+
| Never Assessed |       |           |        |      |
+----------------+-------+-----------+--------+------+
 
 
 
+------------------+---------------+
| Sex Assigned at  | Date Recorded |
| Birth            |               |
+------------------+---------------+
| Not on file      |               |
+------------------+---------------+
 
 
 
+----------------+-------------+-------------+
| Job Start Date | Occupation  | Industry    |
+----------------+-------------+-------------+
| Not on file    | Not on file | Not on file |
+----------------+-------------+-------------+
 
 
 
+----------------+--------------+------------+
| Travel History | Travel Start | Travel End |
+----------------+--------------+------------+
 
 
 
+-------------------------------------+
| No recent travel history available. |
+-------------------------------------+
 documented as of this encounter
 
 Plan of Treatment
 
 Not on filedocumented as of this encounter
 
 Procedures
 
 
+--------------------+--------+-------------+----------------------+----------------------+
| Procedure Name     | Priori | Date/Time   | Associated Diagnosis | Comments             |
|                    | ty     |             |                      |                      |
+--------------------+--------+-------------+----------------------+----------------------+
| X-RAY CHEST 2 VIEW | Priori | 1998  |                      |   Results for this   |
|                    | ty     |  4:40 PM    |                      | procedure are in the |
|                    |        | PDT         |                      |  results section.    |
+--------------------+--------+-------------+----------------------+----------------------+
 documented in this encounter
 
 Results
 CHEST 2 VIEW (1998  4:40 PM PDT)
 
+-----------+--------------------------+-----------+------------+--------------+
| Component | Value                    | Ref Range | Performed  | Pathologist  |
|           |                          |           | At         | Signature    |
+-----------+--------------------------+-----------+------------+--------------+
| CHEST, 2  | Radiologist 1: [facility]   |           |            |              |
| [facility] [place]  | AILIN ELMORE M.D.TWO VIEW |           |            |              |
| STEREO    |  CHEST:   98.      |           |            |              |
|           |   Dictated:   98   |           |            |              |
|           | COMPARISON:   No         |           |            |              |
|           | comparison. FINDINGS:    |           |            |              |
|           |   The lungs are clear.   |           |            |              |
|           |   The heart is normal in |           |            |              |
|           |  size andconfiguration.  |           |            |              |
|           |   Atherosclerotic        |           |            |              |
|           | changes are seen in the  |           |            |              |
|           | tortuousaorta.   No      |           |            |              |
|           | effusion is evident.     |           |            |              |
|           |   Degenerative changes   |           |            |              |
|           | are seen in thethoracic  |           |            |              |
|           | spine with mild anterior |           |            |              |
|           |  wedging of several      |           |            |              |
|           | midthoracicvertebral     |           |            |              |
|           | bodies.  IMPRESSION: No  |           |            |              |
|           | evidence of an acute     |           |            |              |
|           | cardiopulmonary process. |           |            |              |
|           |   END OF IMPRESSION:     |           |            |              |
+-----------+--------------------------+-----------+------------+--------------+
 
 
 
+----------+
| Specimen |
+----------+
|          |
+----------+
 
 
 
+---------------------------------+--------------+
| Narrative                       | Performed At |
+---------------------------------+--------------+
|   Ordered by [facility] ASHLEY M.D.    |              |
 
+---------------------------------+--------------+
 
 
 
+----------------------+---------+--------------------+--------------+
| Performing           | Address | City/State/Zipcode | Phone Number |
| Organization         |         |                    |              |
+----------------------+---------+--------------------+--------------+
|   [facility] DEPARTMENT OF |         |                    |              |
|  RADIOLOGY           |         |                    |              |
+----------------------+---------+--------------------+--------------+
 documented in this encounter
 
 Visit Diagnoses
 Not on filedocumented in this encounter

## 2020-06-03 NOTE — XMS
Encounter Summary
  Created on: 2020
 
 Isak Hilario
 External Reference #: [id number]
 : 30
 Sex: Male
 
 Demographics
 
 
+-----------------------+----------------------+
| Address               | [address]         |
|                       | [place]  [address] |
+-----------------------+----------------------+
| Home Phone            | [phone]      |
+-----------------------+----------------------+
| Preferred Language    | Unknown              |
+-----------------------+----------------------+
| Marital Status        |               |
+-----------------------+----------------------+
| Christianity Affiliation | Unknown              |
+-----------------------+----------------------+
| Race                  | Unknown              |
+-----------------------+----------------------+
| Ethnic Group          | Unknown              |
+-----------------------+----------------------+
 
 
 Author
 
 
+--------------+--------------------------------------------+
| Author       | [facility]  |
|              | and [place]                                |
+--------------+--------------------------------------------+
| Organization | [facility]  |
|              | and [place]                                |
+--------------+--------------------------------------------+
| Address      | Unknown                                    |
+--------------+--------------------------------------------+
| Phone        | Unavailable                                |
+--------------+--------------------------------------------+
 
 
 
 Support
 
 
+-------------+--------------+---------+-----------------+
| Name        | Relationship | Address | Phone           |
+-------------+--------------+---------+-----------------+
| Alessandra Saxena | ECON         | Unknown | [phone] |
+-------------+--------------+---------+-----------------+
 
 
 
 Care Team Providers
 
 
 
+-----------------------+------+-----------------+
| Care Team Member Name | Role | Phone           |
+-----------------------+------+-----------------+
| Bari Ha MD | PCP  | [phone] |
+-----------------------+------+-----------------+
 
 
 
 Reason for Visit
 
 
+----------------------+----------+
| Reason               | Comments |
+----------------------+----------+
| Coordination Of Care |          |
+----------------------+----------+
 
 
 
 Encounter Details
 
 
+--------+-----------+----------------------+--------------------+----------------------+
| Date   | Type      | Department           | Care Team          | Description          |
+--------+-----------+----------------------+--------------------+----------------------+
| / | Telephone |   [facility]  |   Peter Hess  | Coordination Of Care |
| 2020   |           | SURGERY  [address]   | [address]    |                      |
|        |           | [place]  [place], [place]  | [address]  [facility]    |                      |
|        |           | [address]           | [place] [address]    |                      |
|        |           | [phone]         | [phone]       |                      |
|        |           |                      | [phone] (Fax) |                      |
+--------+-----------+----------------------+--------------------+----------------------+
 
 
 
 Social History
 
 
+--------------+-------+-----------+--------+------+
| Tobacco Use  | Types | Packs/Day | Years  | Date |
|              |       |           | Used   |      |
+--------------+-------+-----------+--------+------+
| Never Smoker |       |           |        |      |
+--------------+-------+-----------+--------+------+
 
 
 
+---------------------+------+---+----------+
| Smokeless Tobacco:  | Chew |   | Quit:    |
| Former User         |      |   | 19 |
|                     |      |   | 85       |
+---------------------+------+---+----------+
 
 
 
+-------------+-------------+---------+----------+
| Alcohol Use | Drinks/Week | oz/Week | Comments |
+-------------+-------------+---------+----------+
 
| Not Asked   |             |         |          |
+-------------+-------------+---------+----------+
 
 
 
+------------------+---------------+
| Sex Assigned at  | Date Recorded |
| Birth            |               |
+------------------+---------------+
| Not on file      |               |
+------------------+---------------+
 
 
 
+----------------+-------------+-------------+
| Job Start Date | Occupation  | Industry    |
+----------------+-------------+-------------+
| Not on file    | Not on file | Not on file |
+----------------+-------------+-------------+
 
 
 
+----------------+--------------+------------+
| Travel History | Travel Start | Travel End |
+----------------+--------------+------------+
 
 
 
+-------------------------------------+
| No recent travel history available. |
+-------------------------------------+
 documented as of this encounter
 
 Plan of Treatment
 Not on filedocumented as of this encounter
 
 Visit Diagnoses
 Not on filedocumented in this encounter

## 2020-06-03 NOTE — XMS
Encounter Summary
  Created on: 2020
 
 Isak Hilario
 External Reference #: [id number]
 : 30
 Sex: Male
 
 Demographics
 
 
+-----------------------+------------------------+
| Address               | [address]       |
|                       | [place]  [address]   |
+-----------------------+------------------------+
| Home Phone            | [phone]        |
+-----------------------+------------------------+
| Preferred Language    | Unknown                |
+-----------------------+------------------------+
| Marital Status        |                 |
+-----------------------+------------------------+
| Nondenominational Affiliation | NON                    |
+-----------------------+------------------------+
| Race                  | White                  |
+-----------------------+------------------------+
| Ethnic Group          | Not  or  |
+-----------------------+------------------------+
 
 
 Author
 
 
+--------------+------------------------------+
| Author       | [facility] |
+--------------+------------------------------+
| Organization | [facility] |
+--------------+------------------------------+
| Address      | Unknown                      |
+--------------+------------------------------+
| Phone        | Unavailable                  |
+--------------+------------------------------+
 
 
 
 Support
 
 
+-------------------+--------------+-------------------+-----------------+
| Name              | Relationship | Address           | Phone           |
+-------------------+--------------+-------------------+-----------------+
| Jackie Hilario | ECON         | [address]      | [phone] |
|                   |              | [place], [place]   |                 |
|                   |              | [address]             |                 |
+-------------------+--------------+-------------------+-----------------+
 
 
 
 Care Team Providers
 
 
 
+-----------------------+------+-------------+
| Care Team Member Name | Role | Phone       |
+-----------------------+------+-------------+
 PCP  | Unavailable |
+-----------------------+------+-------------+
 
 
 
 Encounter Details
 
 
+--------+-------------+-----------------+---------------------+---------------+
| Date   | Type        | Department      | Care Team           | Description   |
+--------+-------------+-----------------+---------------------+---------------+
| / | Office      |   [facility]  |   Note, Outpatient  | Progress Note |
| 1998   | Visit-Trans | MEDICINE        | Clinic              |               |
|        | cribed      |                 |                     |               |
+--------+-------------+-----------------+---------------------+---------------+
 
 
 
 Social History
 
 
+----------------+-------+-----------+--------+------+
| Tobacco Use    | Types | Packs/Day | Years  | Date |
|                |       |           | Used   |      |
+----------------+-------+-----------+--------+------+
| Never Assessed |       |           |        |      |
+----------------+-------+-----------+--------+------+
 
 
 
+------------------+---------------+
| Sex Assigned at  | Date Recorded |
| Birth            |               |
+------------------+---------------+
| Not on file      |               |
+------------------+---------------+
 
 
 
+----------------+-------------+-------------+
| Job Start Date | Occupation  | Industry    |
+----------------+-------------+-------------+
| Not on file    | Not on file | Not on file |
+----------------+-------------+-------------+
 
 
 
+----------------+--------------+------------+
| Travel History | Travel Start | Travel End |
+----------------+--------------+------------+
 
 
 
+-------------------------------------+
| No recent travel history available. |
 
+-------------------------------------+
 documented as of this encounter
 
 Progress Notes
 Interface, Transcription In - 2007  5:02 AM PST CLINIC DATE: 98
  
  UROLOGY/ONCOLOGY CLINIC
  
  Mr. Hilario is a 67-year-old male who was found to have elevated PSA of 20.9
  with a 5% free PSA fraction, and an abnormal digital rectal examination. He
  underwent biopsy of the prostate revealing Aleta score of 3+/-4,
  adenocarcinoma of the left base, left mid and right base. This was an
  amuploid tumor. He subsequently underwent radical retropubic prostatectomy
  with bilateral pelvic lymph node dissection on 98. His postoperative
  course was unremarkable. His Soto catheter was removed in two weeks and he
  has had no problems since that time. He returns today for routine followup.
  Patient states that he is currently dry as far as urinary continence is
  concerned. He wears no pads. He does complain of nocturia times one at
  night. He has no significant urge component. He is happy as far as this is
  concerned.
  
  Patient has had no erection since surgery and he is not interested in
  discussing any therapy at this time although outlined what possible
  therapies would be available for him if he continued to have erectile
  dysfunction after surgery.
  
  He brings his PSA in from his primary care doctor. It is at 6 weeks
  (98) undetectable at less than 0.04 by his laboratory's standards.
  Patient has no other concerns or complaints today.
  
  OBJECTIVE: Abdomen is soft and nontender. The incision is healing well
  without evidence of infection or hernia. Extremities no edema. No calf
  tenderness.
  
  ASSESSMENT: Doing well after radical prostatectomy with undetectable PSA.
  
  PLAN:
  1. Followup with us in four months or if patient desires he may have
   further followup care performed up by his primary care physician, Dr. Bari Oliveira. We recommend PSA to be checked every 3 months for the
   first year and then yearly after that.
  2. I explained to patient that he needs to be concerned and notify us if
   he has problems with decreasing force of urinary stream, bony pain or
   PSA begins to elevate on routine testing.
  
  
  Wojciech Koroma M.D.
  Resident, Urology
  
  CARI/pedro
  D: 98
  T: 98
  
  cc:
  
  
  
  NOEL OLIVEIRA MD
  [address] [facility] [place] [address]
 
   Electronically signed by Interface, Transcription In at 2007  5:02 AM PSTdocumented
 in this encounter
 
 Plan of Treatment
 Not on filedocumented as of this encounter
 
 Visit Diagnoses
 Not on filedocumented in this encounter

## 2020-06-03 NOTE — EKG
[facility]
                                    [address]
                                  [place]  [address]
_________________________________________________________________________________________
                                                                 Signed   
 
 
Idioventricular rhythm
Left axis deviation
Right bundle branch block
Abnormal ECG
When compared with ECG of 12-MAR-2020 11:49,
Idioventricular rhythm has replaced Atrial fibrillation
Vent. rate has decreased BY  54 BPM
Confirmed by JUAN DAVID FLEMING DO ([id number]) on 6/3/2020 9:11:31 PM
 
 
 
 
 
 
 
 
 
 
 
 
 
 
 
 
 
 
 
 
 
 
 
 
 
 
 
 
 
 
 
 
 
 
 
 
 
    Electronically Signed By: JUAN DAVID FLEMING DO  06/03/20 2111
_________________________________________________________________________________________
PATIENT NAME:     ISRA FARIA                
MEDICAL RECORD #: [id number]                     Electrocardiogram             
          ACCT #: [id number]  
DATE OF BIRTH:   [birth year]                                       
PHYSICIAN:   JUAN DAVID FLEMING DO                     REPORT #: [id number]
REPORT IS CONFIDENTIAL AND NOT TO BE RELEASED WITHOUT AUTHORIZATION

## 2020-06-03 NOTE — XMS
Encounter Summary
  Created on: 2020
 
 Isak Hilario
 External Reference #: [id number]
 : 30
 Sex: Male
 
 Demographics
 
 
+-----------------------+----------------------+
| Address               | [address]         |
|                       | [place]  [address] |
+-----------------------+----------------------+
| Home Phone            | [phone]      |
+-----------------------+----------------------+
| Preferred Language    | Unknown              |
+-----------------------+----------------------+
| Marital Status        |               |
+-----------------------+----------------------+
| Taoism Affiliation | Unknown              |
+-----------------------+----------------------+
| Race                  | Unknown              |
+-----------------------+----------------------+
| Ethnic Group          | Unknown              |
+-----------------------+----------------------+
 
 
 Author
 
 
+--------------+--------------------------------------------+
| Author       | [facility]  |
|              | and [place]                                |
+--------------+--------------------------------------------+
| Organization | [facility]  |
|              | and [place]                                |
+--------------+--------------------------------------------+
| Address      | Unknown                                    |
+--------------+--------------------------------------------+
| Phone        | Unavailable                                |
+--------------+--------------------------------------------+
 
 
 
 Support
 
 
+-------------+--------------+---------+-----------------+
| Name        | Relationship | Address | Phone           |
+-------------+--------------+---------+-----------------+
| Alessandra Saxena | ECON         | Unknown | [phone] |
+-------------+--------------+---------+-----------------+
 
 
 
 Care Team Providers
 
 
 
+-----------------------+------+-------------+
| Care Team Member Name | Role | Phone       |
+-----------------------+------+-------------+
 PCP  | Unavailable |
+-----------------------+------+-------------+
 
 
 
 Encounter Details
 
 
+--------+----------+----------------------+----------------------+-------------+
| Date   | Type     | Department           | Care Team            | Description |
+--------+----------+----------------------+----------------------+-------------+
| / | Abstract |   [facility]  |   [facility] |             |
|    |          | Conversion Location  |  SR                  |             |
|        |          |  [address]         |                      |             |
|        |          | [place], [place]         |                      |             |
|        |          | [address]           |                      |             |
|        |          | [phone]         |                      |             |
+--------+----------+----------------------+----------------------+-------------+
 
 
 
 Social History
 
 
+----------------+-------+-----------+--------+------+
| Tobacco Use    | Types | Packs/Day | Years  | Date |
|                |       |           | Used   |      |
+----------------+-------+-----------+--------+------+
| Never Assessed |       |           |        |      |
+----------------+-------+-----------+--------+------+
 
 
 
+------------------+---------------+
| Sex Assigned at  | Date Recorded |
| Birth            |               |
+------------------+---------------+
| Not on file      |               |
+------------------+---------------+
 
 
 
+----------------+-------------+-------------+
| Job Start Date | Occupation  | Industry    |
+----------------+-------------+-------------+
| Not on file    | Not on file | Not on file |
+----------------+-------------+-------------+
 
 
 
+----------------+--------------+------------+
| Travel History | Travel Start | Travel End |
+----------------+--------------+------------+
 
 
 
 
+-------------------------------------+
| No recent travel history available. |
+-------------------------------------+
 documented as of this encounter
 
 Last Filed Vital Signs
 
 
+-------------------+---------------------+----------------------+----------+
| Vital Sign        | Reading             | Time Taken           | Comments |
+-------------------+---------------------+----------------------+----------+
| Blood Pressure    | 128/82              | 07/10/2012 12:00 AM  |          |
|                   |                     | PDT                  |          |
+-------------------+---------------------+----------------------+----------+
| Pulse             | -                   | -                    |          |
+-------------------+---------------------+----------------------+----------+
| Temperature       | -                   | -                    |          |
+-------------------+---------------------+----------------------+----------+
| Respiratory Rate  | -                   | -                    |          |
+-------------------+---------------------+----------------------+----------+
| Oxygen Saturation | -                   | -                    |          |
+-------------------+---------------------+----------------------+----------+
| Inhaled Oxygen    | -                   | -                    |          |
| Concentration     |                     |                      |          |
+-------------------+---------------------+----------------------+----------+
| Weight            | 95.3 kg (210 lb)    | 2012 12:00 AM  |          |
|                   |                     | PDT                  |          |
+-------------------+---------------------+----------------------+----------+
| Height            | 165.7 cm (5' 5.25") | 10/29/2010 12:00 AM  |          |
|                   |                     | PDT                  |          |
+-------------------+---------------------+----------------------+----------+
| Body Mass Index   | 34.68               | 10/29/2010 12:00 AM  |          |
|                   |                     | PDT                  |          |
+-------------------+---------------------+----------------------+----------+
 documented in this encounter
 
 Plan of Treatment
 Not on filedocumented as of this encounter
 
 Visit Diagnoses
 Not on filedocumented in this encounter

## 2020-06-03 NOTE — XMS
Encounter Summary
  Created on: 2020
 
 Isak Hilario
 External Reference #: [id number]
 : 30
 Sex: Male
 
 Demographics
 
 
+-----------------------+------------------------+
| Address               | [address]       |
|                       | [place]  [address]   |
+-----------------------+------------------------+
| Home Phone            | [phone]        |
+-----------------------+------------------------+
| Preferred Language    | Unknown                |
+-----------------------+------------------------+
| Marital Status        |                 |
+-----------------------+------------------------+
| Latter-day Affiliation | NON                    |
+-----------------------+------------------------+
| Race                  | White                  |
+-----------------------+------------------------+
| Ethnic Group          | Not  or  |
+-----------------------+------------------------+
 
 
 Author
 
 
+--------------+------------------------------+
| Author       | [facility] |
+--------------+------------------------------+
| Organization | [facility] |
+--------------+------------------------------+
| Address      | Unknown                      |
+--------------+------------------------------+
| Phone        | Unavailable                  |
+--------------+------------------------------+
 
 
 
 Support
 
 
+-------------------+--------------+-------------------+-----------------+
| Name              | Relationship | Address           | Phone           |
+-------------------+--------------+-------------------+-----------------+
| Jackie Hilario | ECON         | [address]      | [phone] |
|                   |              | [place], [place]   |                 |
|                   |              | [address]             |                 |
+-------------------+--------------+-------------------+-----------------+
 
 
 
 Care Team Providers
 
 
 
+-----------------------+------+-------------+
| Care Team Member Name | Role | Phone       |
+-----------------------+------+-------------+
 PCP  | Unavailable |
+-----------------------+------+-------------+
 
 
 
 Encounter Details
 
 
+--------+-------------+---------------------+---------------------+---------------+
| Date   | Type        | Department          | Care Team           | Description   |
+--------+-------------+---------------------+---------------------+---------------+
| / | Office      |   General Internal  |   Note, Outpatient  | Progress Note |
|    | Visit-Trans | Medicine  [address]   | Clinic              |               |
|        | [facility]      | [address]       |                     |               |
|        |             | Mailcode: L475      |                     |               |
|        |             | Outpatient Clinic   |                     |               |
|        |             | [facility] [address]      |                     |               |
|        |             | [place], [place]        |                     |               |
|        |             | [address]          |                     |               |
|        |             | [phone]        |                     |               |
+--------+-------------+---------------------+---------------------+---------------+
 
 
 
 Social History
 
 
+----------------+-------+-----------+--------+------+
| Tobacco Use    | Types | Packs/Day | Years  | Date |
|                |       |           | Used   |      |
+----------------+-------+-----------+--------+------+
| Never Assessed |       |           |        |      |
+----------------+-------+-----------+--------+------+
 
 
 
+------------------+---------------+
| Sex Assigned at  | Date Recorded |
| Birth            |               |
+------------------+---------------+
| Not on file      |               |
+------------------+---------------+
 
 
 
+----------------+-------------+-------------+
| Job Start Date | Occupation  | Industry    |
+----------------+-------------+-------------+
| Not on file    | Not on file | Not on file |
+----------------+-------------+-------------+
 
 
 
+----------------+--------------+------------+
| Travel History | Travel Start | Travel End |
 
+----------------+--------------+------------+
 
 
 
+-------------------------------------+
| No recent travel history available. |
+-------------------------------------+
 documented as of this encounter
 
 Progress Notes
 Interface, Transcription In - 2007  3:12 AM PST CLINIC DATE: 98
  
  UROLOGY ONCOLOGY CLINIC
  
  SUBJECTIVE: This 67-year-old white male found on digital rectal
  examination to have a prostatic nodule. His serum PSA obtained at that time
  was 20.9 with a 5% free PSA fraction. For that reason, the patient was
  referred to Dr. Bonner in Urology undergoing a transrectal ultrasound with
  biopsy of the prostate. Biopsies revealed extensive adenocarcinoma
  involving four millimeters of Schene[place] score 3+4 adenocarcinoma of the left
  base, six millimeters Schene[place] score 7 adenocarcinoma of the left mid, and 10
  milligrams of [place]ne[place] score 6 adenocarcinoma of the right base. Ploide
  studies revealed an anuploid tumor. The patient was advised regarding his
  options and now comes to [facility] for opinions about treatment options.
  
  PAST MEDICAL HISTORY: Operative procedures: Open excision of damaged
  meniscus, 1950, right knee; excision of pilonidal cyst in ; bilateral
  carpal tunnel release in . Major illnesses: Chronic hypertension,
  otherwise negative.
  
  MEDICATIONS:
  1. Cardura 8 milligrams q day.
  2. Allopurinol 300 milligrams q day.
  3. Dyazide one tablet q day.
  4. Aspirin one adult tablet q day.
  
  ALLERGIES: None known.
  
  SOCIAL HISTORY: Occupation: the patient serves as  for
  the [employer] in which [place] is situated. Tobacco use: The patient used
  one pack or less of cigarettes per day for severals, none for the last
  several years. Alcohol: Moderate use on a social basis.
  
  FAMILY HISTORY: Father  age 77 of unknown causes. Mother  age 66
  from heart attack.
  
  REVIEW OF SYSTEMS: Basically noncontributory except for mild voiding
  dysfunction and is detailed in the clinical record of this date.
  
  OBJECTIVE: Blood pressure 164/90, temperature 95.5, weight 194. GENERAL:
  well-developed, well-nourished white male in no acute distress. ABDOMEN:
  Soft, nontender without organomegaly. Normal bowel sounds, no masses
  palpated. GENITALIA: Both testes descended without masses. Penis normal.
  Scrotum without lesions. RECTAL: The prostate is enlarged with a palpable
  area of firmness in the right midlobe, and an area of increased firmness
  along the midportion of the left lobe.
  
  Outside slides are not available for review.
  
  ASSESSMENT: Extensive adenocarcinoma of the prostate with several areas of
 
  pattern for tumor indicating an elevated PSA of 20. These findings of high
  grade tumor and PSA of 20 or greater indicate a high possibility of tumor
  involving the pelvic nodes, and tumor extending down the margins of the
  prostate. These possibilities are of extreme significance particularly
  prognostically to the patient and indicating the possibility of future
  therapeutic intervention effectiveness. My specific advice to the patient
  was that he undergo a biopsy of the seminal vesicles to eliminate seminal
  vesical involvement and if the seminal vesicles are not involved then a
  pelvic node dissection would be appropriate to eliminate the possibility of
  pelvic node involvement. The different types of tami dissection including
  open lymph node dissection, versus laparoscopic, essential risks and
  benefits of each were discussed in detail. The patient is aware that a
  laparoscopic node dissection has reduced morbidity with more rapid return to
  work, however, there are specific problems that can be associated with this
  procedure such as injury to adjacent visceral vascular structures,
  particularly vascular perforation from the trocars, complications of the CO2
  insufflation such as CO2 embolus, irritation to the gastrointestinal tract.
  The patient appears to understand and desires to undergo a needle biopsy
  prior to embarking upon any further treatment.
  
  
  
  [facility]
  Chief, Urologic Oncology
  , Surgery
  
  /flaquita
  D: 98
  T: 98
  
  cc:
  
  
  
  BETH OLIVEIRA MD
  [address] [place] [address] [address]
  [facility] [address]
  
  
  TREMAINE BONNER DO
  [address]
  [facility] [address]
   Electronically signed by Interface, Transcription In at 2007  3:12 AM PSTdocumented
 in this encounter
 
 Plan of Treatment
 Not on filedocumented as of this encounter
 
 Visit Diagnoses
 Not on filedocumented in this encounter

## 2020-06-03 NOTE — XMS
Encounter Summary
  Created on: 2020
 
 Isak Hilario
 External Reference #: [id number]
 : 30
 Sex: Male
 
 Demographics
 
 
+-----------------------+----------------------+
| Address               | [address]         |
|                       | [place]  [address] |
+-----------------------+----------------------+
| Home Phone            | [phone]      |
+-----------------------+----------------------+
| Preferred Language    | Unknown              |
+-----------------------+----------------------+
| Marital Status        |               |
+-----------------------+----------------------+
| Restorationist Affiliation | Unknown              |
+-----------------------+----------------------+
| Race                  | Unknown              |
+-----------------------+----------------------+
| Ethnic Group          | Unknown              |
+-----------------------+----------------------+
 
 
 Author
 
 
+--------------+--------------------------------------------+
| Author       | [facility]  |
|              | and [place]                                |
+--------------+--------------------------------------------+
| Organization | [facility]  |
|              | and [place]                                |
+--------------+--------------------------------------------+
| Address      | Unknown                                    |
+--------------+--------------------------------------------+
| Phone        | Unavailable                                |
+--------------+--------------------------------------------+
 
 
 
 Support
 
 
+-------------+--------------+---------+-----------------+
| Name        | Relationship | Address | Phone           |
+-------------+--------------+---------+-----------------+
| Alessandra Saxena | ECON         | Unknown | [phone] |
+-------------+--------------+---------+-----------------+
 
 
 
 Care Team Providers
 
 
 
+-----------------------+------+-------------+
| Care Team Member Name | Role | Phone       |
+-----------------------+------+-------------+
 PCP  | Unavailable |
+-----------------------+------+-------------+
 
 
 
 Encounter Details
 
 
+--------+-----------+----------------------+-----------+-------------+
| Date   | Type      | Department           | Care Team | Description |
+--------+-----------+----------------------+-----------+-------------+
| / | Hospital  |   [facility] |           |             |
|    | Encounter |  [facility] SLEEP       |           |             |
|        |           | CENTER  [address] |           |             |
|        |           |   [place]    |           |             |
|        |           | [address]           |           |             |
|        |           | [phone]         |           |             |
+--------+-----------+----------------------+-----------+-------------+
 
 
 
 Social History
 
 
+----------------+-------+-----------+--------+------+
| Tobacco Use    | Types | Packs/Day | Years  | Date |
|                |       |           | Used   |      |
+----------------+-------+-----------+--------+------+
| Never Assessed |       |           |        |      |
+----------------+-------+-----------+--------+------+
 
 
 
+------------------+---------------+
| Sex Assigned at  | Date Recorded |
| Birth            |               |
+------------------+---------------+
| Not on file      |               |
+------------------+---------------+
 
 
 
+----------------+-------------+-------------+
| Job Start Date | Occupation  | Industry    |
+----------------+-------------+-------------+
| Not on file    | Not on file | Not on file |
+----------------+-------------+-------------+
 
 
 
+----------------+--------------+------------+
| Travel History | Travel Start | Travel End |
+----------------+--------------+------------+
 
 
 
 
+-------------------------------------+
| No recent travel history available. |
+-------------------------------------+
 documented as of this encounter
 
 Plan of Treatment
 Not on filedocumented as of this encounter
 
 Visit Diagnoses
 Not on filedocumented in this encounter

## 2020-06-03 NOTE — XMS
Encounter Summary
  Created on: 2020
 
 Isak Hilario
 External Reference #: [id number]
 : 30
 Sex: Male
 
 Demographics
 
 
+-----------------------+----------------------+
| Address               | [address]         |
|                       | [place]  [address] |
+-----------------------+----------------------+
| Home Phone            | [phone]      |
+-----------------------+----------------------+
| Preferred Language    | Unknown              |
+-----------------------+----------------------+
| Marital Status        |               |
+-----------------------+----------------------+
| Sikh Affiliation | Unknown              |
+-----------------------+----------------------+
| Race                  | Unknown              |
+-----------------------+----------------------+
| Ethnic Group          | Unknown              |
+-----------------------+----------------------+
 
 
 Author
 
 
+--------------+--------------------------------------------+
| Author       | [facility]  |
|              | and [place]                                |
+--------------+--------------------------------------------+
| Organization | [facility]  |
|              | and [place]                                |
+--------------+--------------------------------------------+
| Address      | Unknown                                    |
+--------------+--------------------------------------------+
| Phone        | Unavailable                                |
+--------------+--------------------------------------------+
 
 
 
 Support
 
 
+-------------+--------------+---------+-----------------+
| Name        | Relationship | Address | Phone           |
+-------------+--------------+---------+-----------------+
| Alessandra Saxena | ECON         | Unknown | [phone] |
+-------------+--------------+---------+-----------------+
 
 
 
 Care Team Providers
 
 
 
+-----------------------+------+-----------------+
| Care Team Member Name | Role | Phone           |
+-----------------------+------+-----------------+
| Bari Ha MD | PCP  | [phone] |
+-----------------------+------+-----------------+
 
 
 
 Reason for Visit
 
 
+----------------------+----------+
| Reason               | Comments |
+----------------------+----------+
| Coordination Of Care |          |
+----------------------+----------+
 
 
 
 Encounter Details
 
 
+--------+-----------+----------------------+--------------------+----------------------+
| Date   | Type      | Department           | Care Team          | Description          |
+--------+-----------+----------------------+--------------------+----------------------+
| / | Telephone |   [facility]  |   Peter Hess  | Coordination Of Care |
| 2020   |           | SURGERY  [address]   | [address]    |                      |
|        |           | St  [place]  | [address]  [facility]    |                      |
|        |           | [address]           | [place] [address]    |                      |
|        |           | [phone]         | [phone]       |                      |
|        |           |                      | [phone] (Fax) |                      |
+--------+-----------+----------------------+--------------------+----------------------+
 
 
 
 Social History
 
 
+--------------+-------+-----------+--------+------+
| Tobacco Use  | Types | Packs/Day | Years  | Date |
|              |       |           | Used   |      |
+--------------+-------+-----------+--------+------+
| Never Smoker |       |           |        |      |
+--------------+-------+-----------+--------+------+
 
 
 
+---------------------+------+---+----------+
| Smokeless Tobacco:  | Chew |   | Quit:    |
| Former User         |      |   | 19 |
|                     |      |   | 85       |
+---------------------+------+---+----------+
 
 
 
+-------------+-------------+---------+----------+
| Alcohol Use | Drinks/Week | oz/Week | Comments |
+-------------+-------------+---------+----------+
 
| Not Asked   |             |         |          |
+-------------+-------------+---------+----------+
 
 
 
+------------------+---------------+
| Sex Assigned at  | Date Recorded |
| Birth            |               |
+------------------+---------------+
| Not on file      |               |
+------------------+---------------+
 
 
 
+----------------+-------------+-------------+
| Job Start Date | Occupation  | Industry    |
+----------------+-------------+-------------+
| Not on file    | Not on file | Not on file |
+----------------+-------------+-------------+
 
 
 
+----------------+--------------+------------+
| Travel History | Travel Start | Travel End |
+----------------+--------------+------------+
 
 
 
+-------------------------------------+
| No recent travel history available. |
+-------------------------------------+
 documented as of this encounter
 
 Plan of Treatment
 Not on filedocumented as of this encounter
 
 Visit Diagnoses
 Not on filedocumented in this encounter

## 2020-06-03 NOTE — XMS
Encounter Summary
  Created on: 2020
 
 Isak Hilario
 External Reference #: [id number]
 : 30
 Sex: Male
 
 Demographics
 
 
+-----------------------+------------------------+
| Address               | [address]       |
|                       | [place]  [address]   |
+-----------------------+------------------------+
| Home Phone            | [phone]        |
+-----------------------+------------------------+
| Preferred Language    | Unknown                |
+-----------------------+------------------------+
| Marital Status        |                 |
+-----------------------+------------------------+
| Church Affiliation | NON                    |
+-----------------------+------------------------+
| Race                  | White                  |
+-----------------------+------------------------+
| Ethnic Group          | Not  or  |
+-----------------------+------------------------+
 
 
 Author
 
 
+--------------+------------------------------+
| Author       | [facility] |
+--------------+------------------------------+
| Organization | [facility] |
+--------------+------------------------------+
| Address      | Unknown                      |
+--------------+------------------------------+
| Phone        | Unavailable                  |
+--------------+------------------------------+
 
 
 
 Support
 
 
+-------------------+--------------+-------------------+-----------------+
| Name              | Relationship | Address           | Phone           |
+-------------------+--------------+-------------------+-----------------+
| Jackie Hilario | ECON         | [address]      | [phone] |
|                   |              | [place], [place]   |                 |
|                   |              | [address]             |                 |
+-------------------+--------------+-------------------+-----------------+
 
 
 
 Care Team Providers
 
 
 
+-----------------------+------+-------------+
| Care Team Member Name | Role | Phone       |
+-----------------------+------+-------------+
 PCP  | Unavailable |
+-----------------------+------+-------------+
 
 
 
 Encounter Details
 
 
+--------+-------------+----------------------+--------------------+-------------+
| Date   | Type        | Department           | Care Team          | Description |
+--------+-------------+----------------------+--------------------+-------------+
| / | Documentati |   Health Information |   Other, Faculty   |             |
| 2019   | on          |  Services  [address]    | [phone]       |             |
|        |             | [address]  |                    |             |
|        |             |  Mailcode: OP17A     |                    |             |
|        |             | [facility]  |                    |             |
|        |             | [place], [place]  |                    |             |
|        |             | [address]           |                    |             |
|        |             | [phone]         |                    |             |
+--------+-------------+----------------------+--------------------+-------------+
 
 
 
 Social History
 
 
+----------------+-------+-----------+--------+------+
| Tobacco Use    | Types | Packs/Day | Years  | Date |
|                |       |           | Used   |      |
+----------------+-------+-----------+--------+------+
| Never Assessed |       |           |        |      |
+----------------+-------+-----------+--------+------+
 
 
 
+------------------+---------------+
| Sex Assigned at  | Date Recorded |
| Birth            |               |
+------------------+---------------+
| Not on file      |               |
+------------------+---------------+
 
 
 
+----------------+-------------+-------------+
| Job Start Date | Occupation  | Industry    |
+----------------+-------------+-------------+
| Not on file    | Not on file | Not on file |
+----------------+-------------+-------------+
 
 
 
+----------------+--------------+------------+
| Travel History | Travel Start | Travel End |
+----------------+--------------+------------+
 
 
 
 
+-------------------------------------+
| No recent travel history available. |
+-------------------------------------+
 documented as of this encounter
 
 Plan of Treatment
 Not on filedocumented as of this encounter
 
 Visit Diagnoses
 Not on filedocumented in this encounter

## 2020-06-03 NOTE — XMS
Encounter Summary
  Created on: 2020
 
 Isak Hilario
 External Reference #: [id number]
 : 30
 Sex: Male
 
 Demographics
 
 
+-----------------------+----------------------+
| Address               | [address]         |
|                       | [place]  [address] |
+-----------------------+----------------------+
| Home Phone            | [phone]      |
+-----------------------+----------------------+
| Preferred Language    | Unknown              |
+-----------------------+----------------------+
| Marital Status        |               |
+-----------------------+----------------------+
| [employer]ist Affiliation | Unknown              |
+-----------------------+----------------------+
| Race                  | Unknown              |
+-----------------------+----------------------+
| Ethnic Group          | Unknown              |
+-----------------------+----------------------+
 
 
 Author
 
 
+--------------+--------------------------------------------+
| Author       | [facility]  |
|              | and [place]                                |
+--------------+--------------------------------------------+
| Organization | [facility]  |
|              | and [place]                                |
+--------------+--------------------------------------------+
| Address      | Unknown                                    |
+--------------+--------------------------------------------+
| Phone        | Unavailable                                |
+--------------+--------------------------------------------+
 
 
 
 Support
 
 
+-------------+--------------+---------+-----------------+
| Name        | Relationship | Address | Phone           |
+-------------+--------------+---------+-----------------+
| Alessandra Saxena | ECON         | Unknown | [phone] |
+-------------+--------------+---------+-----------------+
 
 
 
 Care Team Providers
 
 
 
+-----------------------+------+-----------------+
| Care Team Member Name | Role | Phone           |
+-----------------------+------+-----------------+
| Bari Ha MD | PCP  | [phone] |
+-----------------------+------+-----------------+
 
 
 
 Reason for Visit
 
 
+-----------+----------+
| Reason    | Comments |
+-----------+----------+
| Follow-up |          |
+-----------+----------+
 
 
 
 Encounter Details
 
 
+--------+-----------+----------------------+----------------------+-------------+
| Date   | Type      | Department           | Care Team            | Description |
+--------+-----------+----------------------+----------------------+-------------+
| 10/13/ | Telephone |   [facility]      |   Kyree Smith PA  | Follow-up   |
| 2016   |           | SLEEP DISORDER  [address]  |  [address]     |             |
|        |           | [address]      | [place]      |             |
|        |           | [place] [address] | [id number]  [phone]  |             |
|        |           |   [phone]       |  [phone] (Fax)  |             |
+--------+-----------+----------------------+----------------------+-------------+
 
 
 
 Social History
 
 
+--------------+-------+-----------+--------+------+
| Tobacco Use  | Types | Packs/Day | Years  | Date |
|              |       |           | Used   |      |
+--------------+-------+-----------+--------+------+
| Never Smoker |       |           |        |      |
+--------------+-------+-----------+--------+------+
 
 
 
+-------------+-------------+---------+----------+
| Alcohol Use | Drinks/Week | oz/Week | Comments |
+-------------+-------------+---------+----------+
| Not Asked   |             |         |          |
+-------------+-------------+---------+----------+
 
 
 
+------------------+---------------+
| Sex Assigned at  | Date Recorded |
| Birth            |               |
+------------------+---------------+
 
| Not on file      |               |
+------------------+---------------+
 
 
 
+----------------+-------------+-------------+
| Job Start Date | Occupation  | Industry    |
+----------------+-------------+-------------+
| Not on file    | Not on file | Not on file |
+----------------+-------------+-------------+
 
 
 
+----------------+--------------+------------+
| Travel History | Travel Start | Travel End |
+----------------+--------------+------------+
 
 
 
+-------------------------------------+
| No recent travel history available. |
+-------------------------------------+
 documented as of this encounter
 
 Plan of Treatment
 Not on filedocumented as of this encounter
 
 Visit Diagnoses
 Not on filedocumented in this encounter

## 2020-06-03 NOTE — XMS
Encounter Summary
  Created on: 2020
 
 Isak Hilario
 External Reference #: [id number]
 : 30
 Sex: Male
 
 Demographics
 
 
+-----------------------+----------------------+
| Address               | [address]         |
|                       | [place]  [address] |
+-----------------------+----------------------+
| Home Phone            | [phone]      |
+-----------------------+----------------------+
| Preferred Language    | Unknown              |
+-----------------------+----------------------+
| Marital Status        |               |
+-----------------------+----------------------+
| Synagogue Affiliation | Unknown              |
+-----------------------+----------------------+
| Race                  | Unknown              |
+-----------------------+----------------------+
| Ethnic Group          | Unknown              |
+-----------------------+----------------------+
 
 
 Author
 
 
+--------------+--------------------------------------------+
| Author       | [facility]  |
|              | and [place]                                |
+--------------+--------------------------------------------+
| Organization | [facility]  |
|              | and [place]                                |
+--------------+--------------------------------------------+
| Address      | Unknown                                    |
+--------------+--------------------------------------------+
| Phone        | Unavailable                                |
+--------------+--------------------------------------------+
 
 
 
 Support
 
 
+-------------+--------------+---------+-----------------+
| Name        | Relationship | Address | Phone           |
+-------------+--------------+---------+-----------------+
| Alessandra Saxena | ECON         | Unknown | [phone] |
+-------------+--------------+---------+-----------------+
 
 
 
 Care Team Providers
 
 
 
+-----------------------+------+-----------------+
| Care Team Member Name | Role | Phone           |
+-----------------------+------+-----------------+
| Bari Ha MD | PCP  | [phone] |
+-----------------------+------+-----------------+
 
 
 
 Encounter Details
 
 
+--------+----------+----------------------+--------------------+-------------+
| Date   | Type     | Department           | Care Team          | Description |
+--------+----------+----------------------+--------------------+-------------+
| / | Abstract |   [facility]  |   Peter Hess  |             |
| 2020   |          | SURGERY  [address]   | [address]    |             |
|        |          | St  [place]  | [address]  [facility]    |             |
|        |          | [address]           | [place] [address]    |             |
|        |          | [phone]         | [phone]       |             |
|        |          |                      | [phone] (Fax) |             |
+--------+----------+----------------------+--------------------+-------------+
 
 
 
 Social History
 
 
+--------------+-------+-----------+--------+------+
| Tobacco Use  | Types | Packs/Day | Years  | Date |
|              |       |           | Used   |      |
+--------------+-------+-----------+--------+------+
| Never Smoker |       |           |        |      |
+--------------+-------+-----------+--------+------+
 
 
 
+-------------+-------------+---------+----------+
| Alcohol Use | Drinks/Week | oz/Week | Comments |
+-------------+-------------+---------+----------+
| Not Asked   |             |         |          |
+-------------+-------------+---------+----------+
 
 
 
+------------------+---------------+
| Sex Assigned at  | Date Recorded |
| Birth            |               |
+------------------+---------------+
| Not on file      |               |
+------------------+---------------+
 
 
 
+----------------+-------------+-------------+
| Job Start Date | Occupation  | Industry    |
+----------------+-------------+-------------+
| Not on file    | Not on file | Not on file |
+----------------+-------------+-------------+
 
 
 
 
+----------------+--------------+------------+
| Travel History | Travel Start | Travel End |
+----------------+--------------+------------+
 
 
 
+-------------------------------------+
| No recent travel history available. |
+-------------------------------------+
 documented as of this encounter
 
 Plan of Treatment
 Not on filedocumented as of this encounter
 
 Visit Diagnoses
 Not on filedocumented in this encounter

## 2020-06-03 NOTE — XMS
Encounter Summary
  Created on: 2020
 
 Isak Hilario
 External Reference #: [id number]
 : 30
 Sex: Male
 
 Demographics
 
 
+-----------------------+----------------------+
| Address               | [address]         |
|                       | [place]  [address] |
+-----------------------+----------------------+
| Home Phone            | [phone]      |
+-----------------------+----------------------+
| Preferred Language    | Unknown              |
+-----------------------+----------------------+
| Marital Status        |               |
+-----------------------+----------------------+
| Sikhism Affiliation | Unknown              |
+-----------------------+----------------------+
| Race                  | Unknown              |
+-----------------------+----------------------+
| Ethnic Group          | Unknown              |
+-----------------------+----------------------+
 
 
 Author
 
 
+--------------+--------------------------------------------+
| Author       | [facility]  |
|              | [facility] [place]                                |
+--------------+--------------------------------------------+
| Organization | [facility]  |
|              | and [place]                                |
+--------------+--------------------------------------------+
| Address      | Unknown                                    |
+--------------+--------------------------------------------+
| Phone        | Unavailable                                |
+--------------+--------------------------------------------+
 
 
 
 Support
 
 
+-------------+--------------+---------+-----------------+
| Name        | Relationship | Address | Phone           |
+-------------+--------------+---------+-----------------+
| Alessandra Saxena | ECON         | Unknown | [phone] |
+-------------+--------------+---------+-----------------+
 
 
 
 Care Team Providers
 
 
 
+-----------------------+------+-----------------+
| Care Team Member Name | Role | Phone           |
+-----------------------+------+-----------------+
| Bari Ha MD | PCP  | [phone] |
+-----------------------+------+-----------------+
 
 
 
 Reason for Visit
 
 
+-------------+----------+
| Reason      | Comments |
+-------------+----------+
| New Patient |          |
+-------------+----------+
 Evaluate & Treat
 
+------------+--------+-----------+--------------+--------------+---------------+
| Status     | Reason | Specialty | Diagnoses /  | Referred By  | Referred To   |
|            |        |           | Procedures   | Contact      | Contact       |
+------------+--------+-----------+--------------+--------------+---------------+
| Authorized |        | Plastic   |   Diagnoses  |   Tomasz,     |   Jimena,    |
|            |        | Surgery   |  Forehead    | Mendoza Dexter,  | Peter        |
|            |        |           | wound        | MD  [address]  | [facility] [address]    |
|            |        |           | Exposed bone | [address]  | [address]  |
|            |        |           |  after       |  [place],  |  [place] LOZADAA, |
|            |        |           | resection of | OR           |  [place] [address]     |
|            |        |           |  basal cell  | [address]   | Phone:        |
|            |        |           | carcinoma    | Phone:       | [phone]  |
|            |        |           | Procedures   | [phone] |  Fax:         |
|            |        |           | [facility]    |   Fax:       | [phone]  |
|            |        |           | OUTPATIENT   | [phone] |               |
|            |        |           | NEW 60       |              |               |
|            |        |           | MINUTES      |              |               |
+------------+--------+-----------+--------------+--------------+---------------+
 
 
 
 
 Encounter Details
 
 
+--------+---------+----------------------+--------------------+----------------------+
| Date   | Type    | Department           | Care Team          | Description          |
+--------+---------+----------------------+--------------------+----------------------+
| / | Office  |   [facility]  |   Peter Hess  | Basal cell carcinoma |
| 2020   | Visit   | SURGERY  [address]   | [address]    |  (BCC) of scalp      |
|        |         | St  [place], [place]  | [address]  [place]    | (Primary Dx);        |
|        |         | [address]           | [facility] [place] [address]    | Hypertension,        |
|        |         | [phone]         | [phone]       | unspecified type     |
|        |         |                      | [phone] (Fax) |                      |
+--------+---------+----------------------+--------------------+----------------------+
 
 
 
 Social History
 
 
 
+--------------+-------+-----------+--------+------+
| Tobacco Use  | Types | Packs/Day | Years  | Date |
|              |       |           | Used   |      |
+--------------+-------+-----------+--------+------+
| Never Smoker |       |           |        |      |
+--------------+-------+-----------+--------+------+
 
 
 
+---------------------+------+---+----------+
| Smokeless Tobacco:  | Chew |   | Quit:    |
| Former User         |      |   | 19 |
|                     |      |   | 85       |
+---------------------+------+---+----------+
 
 
 
+-------------+-------------+---------+----------+
| Alcohol Use | Drinks/Week | oz/Week | Comments |
+-------------+-------------+---------+----------+
| Not Asked   |             |         |          |
+-------------+-------------+---------+----------+
 
 
 
+------------------+---------------+
| Sex Assigned at  | Date Recorded |
| Birth            |               |
+------------------+---------------+
| Not on file      |               |
+------------------+---------------+
 
 
 
+----------------+-------------+-------------+
| Job Start Date | Occupation  | Industry    |
+----------------+-------------+-------------+
| Not on file    | Not on file | Not on file |
+----------------+-------------+-------------+
 
 
 
+----------------+--------------+------------+
| Travel History | Travel Start | Travel End |
+----------------+--------------+------------+
 
 
 
+-------------------------------------+
| No recent travel history available. |
+-------------------------------------+
 documented as of this encounter
 
 Last Filed Vital Signs
 
 
+-------------------+----------------------+----------------------+----------+
| Vital Sign        | Reading              | Time Taken           | Comments |
+-------------------+----------------------+----------------------+----------+
 
| Blood Pressure    | 122/70               | 2020  1:05 PM  |          |
|                   |                      | PST                  |          |
+-------------------+----------------------+----------------------+----------+
| Pulse             | 70                   | 2020  1:05 PM  |          |
|                   |                      | PST                  |          |
+-------------------+----------------------+----------------------+----------+
| Temperature       | 36.9   C (98.4   F)  | 2020  1:05 PM  |          |
|                   |                      | PST                  |          |
+-------------------+----------------------+----------------------+----------+
| Respiratory Rate  | -                    | -                    |          |
+-------------------+----------------------+----------------------+----------+
| Oxygen Saturation | 97%                  | 2020  1:05 PM  |          |
|                   |                      | PST                  |          |
+-------------------+----------------------+----------------------+----------+
| Inhaled Oxygen    | -                    | -                    |          |
| Concentration     |                      |                      |          |
+-------------------+----------------------+----------------------+----------+
| Weight            | 91.9 kg (202 lb 9.6  | 2020  1:05 PM  |          |
|                   | oz)                  | PST                  |          |
+-------------------+----------------------+----------------------+----------+
| Height            | 170.2 cm (5' 7")     | 2020  1:05 PM  |          |
|                   |                      | PST                  |          |
+-------------------+----------------------+----------------------+----------+
| Body Mass Index   | 31.73                | 2020  1:05 PM  |          |
|                   |                      | PST                  |          |
+-------------------+----------------------+----------------------+----------+
 documented in this encounter
 
 Patient Instructions
 Patient Instructions Kourtney Espana RN - 2020  1:00 PM PST
 Take no Aspirin containing products for 7 days prior to surgery on 2020.
 Hold your Spironolactone on the morning of surgery.
 
 I will check with your provider regarding stopping your warfarin prior to surgery. I will c
ontact you with recommendations.
 
 DO NOT EAT OR DRINK ANYTHING EIGHT HOURS before your surgery.  This means no coffee, water,
 juice or toast on the morning of your surgery.  The only exception is essential medicines w
ith a small sip of water (or just enough to get the pills down safely).
 
 I have faxed the orders for your pre-op testing to [facility]. Please go by the
 hospital about one week prior to surgery and have the testing completed. 
 
 On 2020 check in at Same Day Surgery ([address]) at 11:15 am.
 
 Your surgery is called Scalp wound debridement with full-thickness skin graft repair.
 It will start about 1:15 pm and will finish about 2:45 pm.
 You will be ready to go home Same Day.
 
 Anesthesia type recommended: Choice
 
 General IV Sedation with local anesthesia (MAC) IV Sedation Other
 
 MAKE ARRANGEMENTS FOR A RESPONSIBLE ADULT TO DRIVE YOU HOME.
 
 If you have any questions, please call at [phone].  
 Your nurse's name is Kourtney WESLEY.
 Your surgeon's name is Dr. Hess.
 
 I look forward to your having a safe, successful and comfortable surgery.
 
 
 Sign up for [employer] if you want easy access to your medical information online.  
 You can:
 Review your medications, immunizations, allergies and medical history.
 View details of your past and upcoming appointments.
 
 Sign up for [employer] if you want easy access to your medical information online.  Only you, 
your doctor and your health care team are permitted to view the information sent through [employer].  
 
 Through [employer] you can:
 ? Review your medications, immunizations, allergies and medical history.
 ? View details of your past and upcoming appointments.
 ? Receive test results online    no waiting for a phone call or letter
 ? Review health education topics and discharge instructions provided by your physician.
 ? Send secure emails to your healthcare team.
 ? Request renewals of your medications online
 ? Link your family  s accounts to yours for convenient access to appointments, immunizatio
n records, growth charts and more.  
 
 Below are the different ways you can sign up for [employer]:
 ? The first way is to get online at: [email or web address] and sign up directly throug
h the website prior to your appointment with us.  You can call [phone]
[phone] if you have any questions or need assistance.  
 ? The second way is through the [employer] nieves which can be accessed with any smart phone.  Ju
st go to your nieves store and look up [employer].
 ? The third way is to do it while you wait in the room for the doctor at your appointment. 
 The nurse is available if you have any questions or need assistance.
 
 You will need the following information to sign up:
 Email address: _________________________________________________________________
 User name: ____________________________________
 Password: _____________________________________
 Password must be at least 8 characters long, less than 20 characters, and it must have at l
east 1 upper case letter and 1 lower case letter as well as at least 1 number.
 Electronically signed by Kourtney Espana RN at 2020  1:55 PM PST
 documented in this encounter
 
 Progress Notes
 Peter Hess MD - 2020  1:00 PM PSTFormatting of this note might be differ
ent from the original.
  [facility]
 ADMIT HISTORY AND PHYSICAL
 
 Primary Care Physician:  Bari Ha
 
 PATIENT NAME: Isak iHlario     : 1930   
 
 TODAY'S DATE: 2020                  MRN: [id number]
 ________________________________________________________________________
 
 CHIEF COMPLAINT:  New Patient
  
 
 REASON FOR CONSULTATION:  Forehead wound Exposed bone after resection of basal cell carcino
ma
 
 History OF PRESENT ILLNESS: I was asked by Dr. Tolbert to consult on this patient for Plastic
 surgery.
 
 
 Isak Hilario is a 89 y.o. male with Forehead wound Exposed bone after resection of basal c
ell carcinoma.
 
 Patient had a large basel cell carcinoma on the right upper forehead and frontal scalp. Les
ion was excised 2019 with and  repaired with a bilateral advance flap. The flap crispin
led and now has a large defect with exposed bone. He is here to see about possible skin loc
ting.  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 
 Current dressing include Aquafore ointment, povidone iodine ointment, gauze pad, and padded
 bandage. Bandage is changed every few days. Denies noticeable pain, only when being cleaned
.  Patient is on a very low dose Warfarin  5 days a week for chronic A fib. 
 Patients daughters would like to know if the area would close on its own or if a skin graft
 is needed. 
 
 Opioid Risk Tool (ORT):  Total Score  0 (20 7405)
 (0 to 3 = Low risk: 6% chance of developing problematic behaviors, 4 to 7 = Moderate risk: 
28% chance of developing problematic behaviors, 8 or more = High risk: 90% chance of develop
ing problematic behaviors.)
 
 PAST MEDICAL HISTORY
 Past Medical History: 
 Diagnosis Date 
   Atrial fibrillation (HCC)  
   Congestive heart failure (CHF) (HCC)  
   Gout  
   Hyperlipidemia  
   Hypertension  
   Hypothyroidism  
   Impaired fasting glucose  
   Peripheral neuropathy  
   Prostate cancer (HCC)  
   Venous insufficiency  
   Vitamin D deficiency  
 
 PAST SURGICAL HISTORY
 Past Surgical History: 
 Procedure Laterality Date 
   CARPAL TUNNEL RELEASE   
  Bilateral Wrists 
   Cataract Right 2013 
   COLONOSCOPY   
   KNEE SURGERY Right 1965 
   PROSTATECTOMY   
 
 CURRENT MEDICATIONS
 Current Outpatient Medications 
 Medication Sig Dispense Refill 
   cholecalciferol (VITAMIN D-3) 400 UNITS TABS Take 400 Units by mouth Daily.   
   cyanocobalamin (VITAMIN B-12) 500 mcg tablet Take 500 mcg by mouth Daily.   
   dilTIAZem (CARDIZEM CD) 360 MG 24 hr capsule Take 360 mg by mouth Daily.   
   levothyroxine (SYNTHROID) 75 mcg tablet Take 75 mcg by mouth Daily.   
   spironolactone (ALDACTONE) 25 mg tablet Take 25 mg by mouth Daily.   
   warfarin (COUMADIN) 1 mg tablet Take 1 mg by mouth Daily.   
 
 No current facility-administered medications for this visit.  
 
 ALLERGIES
 No Known Allergies
 
 FAMILY HISTORY
 There is no known family history of premature coronary artery disease, CVA nor diabetes.
 
 
 SOCIAL HISTORY
 The pt  reports that he has never smoked. He quit smokeless tobacco use about 35 years ago.
  His smokeless tobacco use included chew.
 
 REVIEW OF SYSTEMS
 Review of Systems 
 Constitutional: Negative.  
 HENT: Negative.  
 Eyes: Negative.  
 Respiratory: Negative.  
 Cardiovascular: Negative.  
 Gastrointestinal: Negative.  
 Genitourinary: Negative.  
 Musculoskeletal: Negative.  
 Skin: Negative.  
 Neurological: Negative.  
 Endo/Heme/Allergies: Negative.  
 Psychiatric/Behavioral: Positive for memory loss. 
      Some memory loss 
  
 
 PHYSICAL EXAM
 /70  | Pulse 70  | Temp 36.9 C (98.4 F) (Temporal)  | Ht 1.702 m (5' 7")  | Wt 91
.9 kg (202 lb 9.6 oz)  | SpO2 97%  | BMI 31.73 kg/m 
 Wt. Admission: Weight: 91.9 kg (202 lb 9.6 oz)
 
 Physical Exam
 Constitutional:  
    Appearance: Normal appearance. 
 HENT: 
    Head: Normocephalic. 
 Eyes: 
    Extraocular Movements: Extraocular movements intact. 
    Pupils: Pupils are equal, round, and reactive to light. 
 Neck: 
    Musculoskeletal: Normal range of motion and neck supple. 
 Pulmonary: 
    Effort: Pulmonary effort is normal. 
    Breath sounds: Normal breath sounds. 
 Musculoskeletal: Normal range of motion. 
 Skin:
    General: Skin is warm and dry. 
    Comments: 6 x 3 cm wound, geographical shape, R anterior scalp and onto forehead.  1 x 2
 cm area of exposed dessicated bone in the mid portion.  Granulating tissue around the harman
n.  Contracted rolled skin margins.  Extensive actinic and seborrheic skin changes on the sc
alp.  The surrounding scalp is stiff and immobile. 
 Neurological: 
    General: No focal deficit present. 
    Mental Status: He is alert and oriented to person, place, and time. 
 Psychiatric:    
    Mood and Affect: Mood normal.    
    Behavior: Behavior normal. 
 
 ASSESSMENT & PLAN:  
 1. Basal cell carcinoma (BCC) of scalp
 
 Right anterior scalp and forehead wound secondary to BCC excision.  Mostly granulating tiss
ue but there is a limited area of exposed desiccated calvarial bone.  It would be difficult 
for the patient to perform a large flap procedure, but we may be able to skin graft the woun
 
d.  The exposed calvarium may be small enough that we can remove the outer table to expose t
he diploe and graft directly.  Or if it is felt to be too large at the time of surgery, the 
diploe can be covered with Integra for secondary closure or delayed grafting after revascula
rization.   
 The planned procedure, incision and resultant scar, graft donor site, risks and complicatio
ns, post op care, and possible need for additional surgery, was discussed.  He will need to 
hold coumadin prior to surgery.
 
 Electronically Signed by:  Peter Hess MD
 
 CC: Bari Ha MD, Mendoza Tolbert MD
 
 Electronically signed by Peter Hess MD at 2020  2:30 PM PSTdocumented in 
this encounter
 
 Plan of Treatment
 
 
+------------------+------+--------+----------------------+----------------------+
| Name             | Type | Priori | Associated Diagnoses | Order Schedule       |
|                  |      | ty     |                      |                      |
+------------------+------+--------+----------------------+----------------------+
| Basic Metabolic  | Lab  | Routin |   Basal cell         | 1 Occurrences        |
| Panel            |      | e      | carcinoma (BCC) of   | starting 2020  |
|                  |      |        | scalp                | until 2021     |
+------------------+------+--------+----------------------+----------------------+
| CBC with         | Lab  | Routin |   Basal cell         | 1 Occurrences        |
| Differential     |      | e      | carcinoma (BCC) of   | starting 2020  |
|                  |      |        | scalp                | until 2021     |
+------------------+------+--------+----------------------+----------------------+
| ECG 12 lead      | ECG  | Routin |   Hypertension,      | 1 Occurrences        |
|                  |      | e      | unspecified type     | starting 2020  |
|                  |      |        | Basal cell carcinoma | until 2021     |
|                  |      |        |  (BCC) of scalp      |                      |
+------------------+------+--------+----------------------+----------------------+
 documented as of this encounter
 
 Visit Diagnoses
 
 
+-------------------------------------------------+
| Diagnosis                                       |
+-------------------------------------------------+
|   Basal cell carcinoma (BCC) of scalp - Primary |
+-------------------------------------------------+
|   Hypertension, unspecified type                |
+-------------------------------------------------+
 documented in this encounter

## 2020-06-03 NOTE — XMS
Encounter Summary
  Created on: 2020
 
 Isak Hilario
 External Reference #: [id number]
 : 30
 Sex: Male
 
 Demographics
 
 
+-----------------------+------------------------+
| Address               | [address]       |
|                       | [place]  [address]   |
+-----------------------+------------------------+
| Home Phone            | [phone]        |
+-----------------------+------------------------+
| Preferred Language    | Unknown                |
+-----------------------+------------------------+
| Marital Status        |                 |
+-----------------------+------------------------+
| Orthodoxy Affiliation | NON                    |
+-----------------------+------------------------+
| Race                  | White                  |
+-----------------------+------------------------+
| Ethnic Group          | Not  or  |
+-----------------------+------------------------+
 
 
 Author
 
 
+--------------+------------------------------+
| Author       | [facility] |
+--------------+------------------------------+
| Organization | [facility] |
+--------------+------------------------------+
| Address      | Unknown                      |
+--------------+------------------------------+
| Phone        | Unavailable                  |
+--------------+------------------------------+
 
 
 
 Support
 
 
+-------------------+--------------+-------------------+-----------------+
| Name              | Relationship | Address           | Phone           |
+-------------------+--------------+-------------------+-----------------+
| Jackie Hilario | ECON         | [address]      | [phone] |
|                   |              | [place], [place]   |                 |
|                   |              | [address]             |                 |
+-------------------+--------------+-------------------+-----------------+
 
 
 
 Care Team Providers
 
 
 
+-----------------------+------+-------------+
| Care Team Member Name | Role | Phone       |
+-----------------------+------+-------------+
 PCP  | Unavailable |
+-----------------------+------+-------------+
 
 
 
 Encounter Details
 
 
+--------+-------------+-----------------+---------------------+---------------+
| Date   | Type        | Department      | Care Team           | Description   |
+--------+-------------+-----------------+---------------------+---------------+
| / | Office      |   [facility]  |   Note, Outpatient  | Progress Note |
| 1998   | Visit-Trans | MEDICINE        | Clinic              |               |
|        | cribed      |                 |                     |               |
+--------+-------------+-----------------+---------------------+---------------+
 
 
 
 Social History
 
 
+----------------+-------+-----------+--------+------+
| Tobacco Use    | Types | Packs/Day | Years  | Date |
|                |       |           | Used   |      |
+----------------+-------+-----------+--------+------+
| Never Assessed |       |           |        |      |
+----------------+-------+-----------+--------+------+
 
 
 
+------------------+---------------+
| Sex Assigned at  | Date Recorded |
| Birth            |               |
+------------------+---------------+
| Not on file      |               |
+------------------+---------------+
 
 
 
+----------------+-------------+-------------+
| Job Start Date | Occupation  | Industry    |
+----------------+-------------+-------------+
| Not on file    | Not on file | Not on file |
+----------------+-------------+-------------+
 
 
 
+----------------+--------------+------------+
| Travel History | Travel Start | Travel End |
+----------------+--------------+------------+
 
 
 
+-------------------------------------+
| No recent travel history available. |
 
+-------------------------------------+
 documented as of this encounter
 
 Progress Notes
 Interface, Transcription In - 2007  5:02 AM PST CLINIC DATE: 98
  
  UROLOGY/ONCOLOGY CLINIC
  
  Mr. Hilario is a 67-year-old male who was found to have elevated PSA of 20.9
  with a 5% free PSA fraction, and an abnormal digital rectal examination. He
  underwent biopsy of the prostate revealing Aleta score of 3+/-4,
  adenocarcinoma of the left base, left mid and right base. This was an
  amuploid tumor. He subsequently underwent radical retropubic prostatectomy
  with bilateral pelvic lymph node dissection on 98. His postoperative
  course was unremarkable. His Soto catheter was removed in two weeks and he
  has had no problems since that time. He returns today for routine followup.
  Patient states that he is currently dry as far as urinary continence is
  concerned. He wears no pads. He does complain of nocturia times one at
  night. He has no significant urge component. He is happy as far as this is
  concerned.
  
  Patient has had no erection since surgery and he is not interested in
  discussing any therapy at this time although outlined what possible
  therapies would be available for him if he continued to have erectile
  dysfunction after surgery.
  
  He brings his PSA in from his primary care doctor. It is at 6 weeks
  (98) undetectable at less than 0.04 by his laboratory's standards.
  Patient has no other concerns or complaints today.
  
  OBJECTIVE: Abdomen is soft and nontender. The incision is healing well
  without evidence of infection or hernia. Extremities no edema. No calf
  tenderness.
  
  ASSESSMENT: Doing well after radical prostatectomy with undetectable PSA.
  
  PLAN:
  1. Followup with us in four months or if patient desires he may have
   further followup care performed up by his primary care physician, Dr. Bari Oliveira. We recommend PSA to be checked every 3 months for the
   first year and then yearly after that.
  2. I explained to patient that he needs to be concerned and notify us if
   he has problems with decreasing force of urinary stream, bony pain or
   PSA begins to elevate on routine testing.
  
  
  Wojciech Koroma M.D.
  Resident, Urology
  
  CARI/pedro
  D: 98
  T: 98
  
  cc:
  
  
  
  NOEL OLIVEIRA MD
  [address] [facility] [place] [address]
 
   Electronically signed by Interface, Transcription In at 2007  5:02 AM PSTdocumented
 in this encounter
 
 Plan of Treatment
 Not on filedocumented as of this encounter
 
 Visit Diagnoses
 Not on filedocumented in this encounter

## 2020-06-03 NOTE — XMS
Encounter Summary
  Created on: 2020
 
 Isak Hilario
 External Reference #: [id number]
 : 30
 Sex: Male
 
 Demographics
 
 
+-----------------------+----------------------+
| Address               | [address]         |
|                       | [place]  [address] |
+-----------------------+----------------------+
| Home Phone            | [phone]      |
+-----------------------+----------------------+
| Preferred Language    | Unknown              |
+-----------------------+----------------------+
| Marital Status        |               |
+-----------------------+----------------------+
| Mandaen Affiliation | Unknown              |
+-----------------------+----------------------+
| Race                  | Unknown              |
+-----------------------+----------------------+
| Ethnic Group          | Unknown              |
+-----------------------+----------------------+
 
 
 Author
 
 
+--------------+--------------------------------------------+
| Author       | [facility]  |
|              | [facility] [place]                                |
+--------------+--------------------------------------------+
| Organization | [facility]  |
|              | [facility] [place]                                |
+--------------+--------------------------------------------+
| Address      | Unknown                                    |
+--------------+--------------------------------------------+
| Phone        | Unavailable                                |
+--------------+--------------------------------------------+
 
 
 
 Support
 
 
+-------------+--------------+---------+-----------------+
| Name        | Relationship | Address | Phone           |
+-------------+--------------+---------+-----------------+
| Alessandra Saxena | ECON         | Unknown | [phone] |
+-------------+--------------+---------+-----------------+
 
 
 
 Care Team Providers
 
 
 
+-----------------------+------+-----------------+
| Care Team Member Name | Role | Phone           |
+-----------------------+------+-----------------+
| Bari aH MD | PCP  | [phone] |
+-----------------------+------+-----------------+
 
 
 
 Encounter Details
 
 
+--------+-------------+----------------------+--------------------+----------------------+
| Date   | Type        | Department           | Care Team          | Description          |
+--------+-------------+----------------------+--------------------+----------------------+
| / | Lab         |   [facility] |   Kaveh Huerta   | Acute on chronic     |
| 2020   | Requisition |  [facility]  | [address]   | diastolic            |
|        |             |  [address]  [place] | [address]   | (congestive) heart   |
|        |             |  [place]           | [address]  [place]     | failure (HCC);       |
|        |             | [address]           | [place] [address]    | Metabolic            |
|        |             | [phone]         | [phone]       | encephalopathy;      |
|        |             |                      | [phone] (Fax) | Essential (primary)  |
|        |             |                      |                    | hypertension         |
+--------+-------------+----------------------+--------------------+----------------------+
 
 
 
 Social History
 
 
+--------------+-------+-----------+--------+------+
| Tobacco Use  | Types | Packs/Day | Years  | Date |
|              |       |           | Used   |      |
+--------------+-------+-----------+--------+------+
| Never Smoker |       |           |        |      |
+--------------+-------+-----------+--------+------+
 
 
 
+---------------------+------+---+----------+
| Smokeless Tobacco:  | Chew |   | Quit:    |
| Former User         |      |   | 19 |
|                     |      |   | 85       |
+---------------------+------+---+----------+
 
 
 
+-------------+-------------+---------+----------+
| Alcohol Use | Drinks/Week | oz/Week | Comments |
+-------------+-------------+---------+----------+
| Not Asked   |             |         |          |
+-------------+-------------+---------+----------+
 
 
 
+------------------+---------------+
| Sex Assigned at  | Date Recorded |
| Birth            |               |
+------------------+---------------+
 
| Not on file      |               |
+------------------+---------------+
 
 
 
+----------------+-------------+-------------+
| Job Start Date | Occupation  | Industry    |
+----------------+-------------+-------------+
| Not on file    | Not on file | Not on file |
+----------------+-------------+-------------+
 
 
 
+----------------+--------------+------------+
| Travel History | Travel Start | Travel End |
+----------------+--------------+------------+
 
 
 
+-------------------------------------+
| No recent travel history available. |
+-------------------------------------+
 documented as of this encounter
 
 Plan of Treatment
 Not on filedocumented as of this encounter
 
 Procedures
 
 
+------------------+--------+-------------+----------------------+----------------------+
| Procedure Name   | Priori | Date/Time   | Associated Diagnosis | Comments             |
|                  | ty     |             |                      |                      |
+------------------+--------+-------------+----------------------+----------------------+
| BASIC METABOLIC  | Routin | 2020  |   Acute on chronic   |   Results for this   |
| PANEL            | e      |  1:30 PM    | diastolic            | procedure are in the |
|                  |        | PDT         | (congestive) heart   |  results section.    |
|                  |        |             | failure (HCC)        |                      |
|                  |        |             | Metabolic            |                      |
|                  |        |             | encephalopathy       |                      |
|                  |        |             | Essential (primary)  |                      |
|                  |        |             | hypertension         |                      |
+------------------+--------+-------------+----------------------+----------------------+
 documented in this encounter
 
 Results
 Basic Metabolic Panel (2020  1:30 PM PDT)
 
+-------------+--------------------------+-----------------+-------------+--------------+
| Component   | Value                    | Ref Range       | Performed   | Pathologist  |
|             |                          |                 | At          | Signature    |
+-------------+--------------------------+-----------------+-------------+--------------+
| Na          | 137                      | 136 - 145       | [facility]  |              |
|             |                          | mmol/L          | [facility]    |              |
|             |                          |                 | [facility]     |              |
|             |                          |                 | [facility] -    |              |
|             |                          |                 | LABORATORY  |              |
+-------------+--------------------------+-----------------+-------------+--------------+
| K           | 3.1 (L)                  | 3.4 - 5.1       | [facility]  |              |
|             |                          | mmol/L          | [facility]    |              |
 
|             |                          |                 | MED[facility]     |              |
|             |                          |                 | CENTER -    |              |
|             |                          |                 | LABORATORY  |              |
+-------------+--------------------------+-----------------+-------------+--------------+
| Cl          | 94 (L)                   | 98 - 107 mmol/L | [facility]  |              |
|             |                          |                 | [facility]    |              |
|             |                          |                 | [facility]     |              |
|             |                          |                 | CENTER -    |              |
|             |                          |                 | LABORATORY  |              |
+-------------+--------------------------+-----------------+-------------+--------------+
| CO2         | 34 (H)                   | 20 - 31 mmol/L  | [facility]  |              |
|             |                          |                 | [facility]    |              |
|             |                          |                 | MED[facility]     |              |
|             |                          |                 | CENTER -    |              |
|             |                          |                 | LABORATORY  |              |
+-------------+--------------------------+-----------------+-------------+--------------+
| Anion Gap   | 9                        | 3 - 16 mmol/L   | [facility]  |              |
|             |                          |                 | [facility]    |              |
|             |                          |                 | MED[facility]     |              |
|             |                          |                 | CENTER -    |              |
|             |                          |                 | LABORATORY  |              |
+-------------+--------------------------+-----------------+-------------+--------------+
| Glucose     | 159 (H)                  | 60 - 106 mg/dL  | [facility]  |              |
|             |                          |                 | [facility]    |              |
|             |                          |                 | MED[facility]     |              |
|             |                          |                 | CENTER -    |              |
|             |                          |                 | LABORATORY  |              |
+-------------+--------------------------+-----------------+-------------+--------------+
| BUN         | 37 (H)                   | 9 - 23 mg/dL    | [facility]  |              |
|             |                          |                 | [facility]    |              |
|             |                          |                 | [facility]     |              |
|             |                          |                 | [facility] -    |              |
|             |                          |                 | LABORATORY  |              |
+-------------+--------------------------+-----------------+-------------+--------------+
| Creatinine  | 1.30                     | 0.70 - 1.30     | [facility]  |              |
|             |                          | mg/dL           | [facility]    |              |
|             |                          |                 | MED[facility]     |              |
|             |                          |                 | CENTER -    |              |
|             |                          |                 | LABORATORY  |              |
+-------------+--------------------------+-----------------+-------------+--------------+
| eGFR if not | 52 (L)Comment:           | >=60            | [facility]  |              |
|      | GLOMERULAR FILTRATION    | mL/min/1.73m2   | [facility]    |              |
| [employer]    | RATE,ESTIMATED           |                 | MEDICAL     |              |
|             |   mL/min/1.84l3Utug than |                 | CENTER -    |              |
|             |  60    Chronic kidney    |                 | LABORATORY  |              |
|             | disease,if found over a  |                 |             |              |
|             | 3-month period.Less than |                 |             |              |
|             |  15    Kidney failureFor |                 |             |              |
|             |                   |                 |             |              |
|             | Americans,multiply the   |                 |             |              |
|             | calculated GFR by 1.21.  |                 |             |              |
|             |                          |                 |             |              |
+-------------+--------------------------+-----------------+-------------+--------------+
| Calcium     | 9.6                      | 8.7 - 10.4      | [facility]  |              |
|             |                          | mg/dL           | [facility]    |              |
|             |                          |                 | MEDICAL     |              |
|             |                          |                 | CENTER -    |              |
|             |                          |                 | LABORATORY  |              |
+-------------+--------------------------+-----------------+-------------+--------------+
| BUN/Creatin | 28.5                     |                 | [facility]  |              |
 
| ine Ratio   |                          |                 | [facility]    |              |
|             |                          |                 | MEDICAL     |              |
|             |                          |                 | CENTER -    |              |
|             |                          |                 | LABORATORY  |              |
+-------------+--------------------------+-----------------+-------------+--------------+
 
 
 
+----------+
| Specimen |
+----------+
| Blood    |
+----------+
 
 
 
+----------------------+--------------------+--------------------+----------------+
| Performing           | Address            | City/State/Zipcode | Phone Number   |
| Organization         |                    |                    |                |
+----------------------+--------------------+--------------------+----------------+
|   [facility]     |   [address] |   [place]  |   [phone] |
| [facility]  |                    | [address]              |                |
| - LABORATORY         |                    |                    |                |
+----------------------+--------------------+--------------------+----------------+
 documented in this encounter
 
 Visit Diagnoses
 
 
+------------------------------------------------------------------------+
| Diagnosis                                                              |
+------------------------------------------------------------------------+
|   Acute on chronic diastolic (congestive) heart failure (HCC)          |
+------------------------------------------------------------------------+
|   Metabolic encephalopathy                                             |
+------------------------------------------------------------------------+
|   Essential (primary) hypertension  Unspecified essential hypertension |
+------------------------------------------------------------------------+
 documented in this encounter

## 2020-06-03 NOTE — XMS
Encounter Summary
  Created on: 2020
 
 Isak Hilario
 External Reference #: [id number]
 : 30
 Sex: Male
 
 Demographics
 
 
+-----------------------+------------------------+
| Address               | [address]       |
|                       | [place]  [address]   |
+-----------------------+------------------------+
| Home Phone            | [phone]        |
+-----------------------+------------------------+
| Preferred Language    | Unknown                |
+-----------------------+------------------------+
| Marital Status        |                 |
+-----------------------+------------------------+
| Muslim Affiliation | NON                    |
+-----------------------+------------------------+
| Race                  | White                  |
+-----------------------+------------------------+
| Ethnic Group          | Not  or  |
+-----------------------+------------------------+
 
 
 Author
 
 
+--------------+------------------------------+
| Author       | [facility] |
+--------------+------------------------------+
| Organization | [facility] |
+--------------+------------------------------+
| Address      | Unknown                      |
+--------------+------------------------------+
| Phone        | Unavailable                  |
+--------------+------------------------------+
 
 
 
 Support
 
 
+-------------------+--------------+-------------------+-----------------+
| Name              | Relationship | Address           | Phone           |
+-------------------+--------------+-------------------+-----------------+
| Jackie Hilario | ECON         | [address]      | [phone] |
|                   |              | [place], [place]   |                 |
|                   |              | [address]             |                 |
+-------------------+--------------+-------------------+-----------------+
 
 
 
 Care Team Providers
 
 
 
+-----------------------+------+-------------+
| Care Team Member Name | Role | Phone       |
+-----------------------+------+-------------+
 PCP  | Unavailable |
+-----------------------+------+-------------+
 
 
 
 Encounter Details
 
 
+--------+-------------+---------------------+---------------------+---------------+
| Date   | Type        | Department          | Care Team           | Description   |
+--------+-------------+---------------------+---------------------+---------------+
| / | Office      |   General Internal  |   Note, Outpatient  | Progress Note |
|    | Visit-Trans | Medicine  [address]   | Clinic              |               |
|        | [facility]      | [address]       |                     |               |
|        |             | Mailcode: L475      |                     |               |
|        |             | Outpatient Clinic   |                     |               |
|        |             | [facility] [address]      |                     |               |
|        |             | [place], [place]        |                     |               |
|        |             | [address]          |                     |               |
|        |             | [phone]        |                     |               |
+--------+-------------+---------------------+---------------------+---------------+
 
 
 
 Social History
 
 
+----------------+-------+-----------+--------+------+
| Tobacco Use    | Types | Packs/Day | Years  | Date |
|                |       |           | Used   |      |
+----------------+-------+-----------+--------+------+
| Never Assessed |       |           |        |      |
+----------------+-------+-----------+--------+------+
 
 
 
+------------------+---------------+
| Sex Assigned at  | Date Recorded |
| Birth            |               |
+------------------+---------------+
| Not on file      |               |
+------------------+---------------+
 
 
 
+----------------+-------------+-------------+
| Job Start Date | Occupation  | Industry    |
+----------------+-------------+-------------+
| Not on file    | Not on file | Not on file |
+----------------+-------------+-------------+
 
 
 
+----------------+--------------+------------+
| Travel History | Travel Start | Travel End |
 
+----------------+--------------+------------+
 
 
 
+-------------------------------------+
| No recent travel history available. |
+-------------------------------------+
 documented as of this encounter
 
 Progress Notes
 Interface, Transcription In - 2007  3:12 AM PST CLINIC DATE: 98
  
  UROLOGY ONCOLOGY CLINIC
  
  SUBJECTIVE: This 67-year-old white male found on digital rectal
  examination to have a prostatic nodule. His serum PSA obtained at that time
  was 20.9 with a 5% free PSA fraction. For that reason, the patient was
  referred to Dr. Bonner in Urology undergoing a transrectal ultrasound with
  biopsy of the prostate. Biopsies revealed extensive adenocarcinoma
  involving four millimeters of [place] score 3+4 adenocarcinoma of the left
  base, six millimeters [place] score 7 adenocarcinoma of the left mid, and 10
  milligrams of [place] score 6 adenocarcinoma of the right base. Ploide
  studies revealed an anuploid tumor. The patient was advised regarding his
  options and now comes to [facility] for opinions about treatment options.
  
  PAST MEDICAL HISTORY: Operative procedures: Open excision of damaged
  meniscus, 1950, right knee; excision of pilonidal cyst in ; bilateral
  carpal tunnel release in . Major illnesses: Chronic hypertension,
  otherwise negative.
  
  MEDICATIONS:
  1. Cardura 8 milligrams q day.
  2. Allopurinol 300 milligrams q day.
  3. Dyazide one tablet q day.
  4. Aspirin one adult tablet q day.
  
  ALLERGIES: None known.
  
  SOCIAL HISTORY: Occupation: the patient serves as  for
  the [employer] in which [place] is situated. Tobacco use: The patient used
  one pack or less of cigarettes per day for severals, none for the last
  several years. Alcohol: Moderate use on a social basis.
  
  FAMILY HISTORY: Father  age 77 of unknown causes. Mother  age 66
  from heart attack.
  
  REVIEW OF SYSTEMS: Basically noncontributory except for mild voiding
  dysfunction and is detailed in the clinical record of this date.
  
  OBJECTIVE: Blood pressure 164/90, temperature 95.5, weight 194. GENERAL:
  well-developed, well-nourished white male in no acute distress. ABDOMEN:
  Soft, nontender without organomegaly. Normal bowel sounds, no masses
  palpated. GENITALIA: Both testes descended without masses. Penis normal.
  Scrotum without lesions. RECTAL: The prostate is enlarged with a palpable
  area of firmness in the right midlobe, and an area of increased firmness
  along the midportion of the left lobe.
  
  Outside slides are not available for review.
  
  ASSESSMENT: Extensive adenocarcinoma of the prostate with several areas of
 
  pattern for tumor indicating an elevated PSA of 20. These findings of high
  grade tumor and PSA of 20 or greater indicate a high possibility of tumor
  involving the pelvic nodes, and tumor extending down the margins of the
  prostate. These possibilities are of extreme significance particularly
  prognostically to the patient and indicating the possibility of future
  therapeutic intervention effectiveness. My specific advice to the patient
  was that he undergo a biopsy of the seminal vesicles to eliminate seminal
  vesical involvement and if the seminal vesicles are not involved then a
  pelvic node dissection would be appropriate to eliminate the possibility of
  pelvic node involvement. The different types of tami dissection including
  open lymph node dissection, versus laparoscopic, essential risks and
  benefits of each were discussed in detail. The patient is aware that a
  laparoscopic node dissection has reduced morbidity with more rapid return to
  work, however, there are specific problems that can be associated with this
  procedure such as injury to adjacent visceral vascular structures,
  particularly vascular perforation from the trocars, complications of the CO2
  insufflation such as CO2 embolus, irritation to the gastrointestinal tract.
  The patient appears to understand and desires to undergo a needle biopsy
  prior to embarking upon any further treatment.
  
  
  
  [facility]
  Chief, Urologic Oncology
  , Surgery
  
  /flaquita
  D: 98
  T: 98
  
  cc:
  
  
  
  BETH OLIVEIRA MD
  [address] [place] [address]
  [facility] [address]
  
  
  TREMAINE BONNER DO
  [address]
  [facility] [address]
   Electronically signed by Interface, Transcription In at 2007  3:12 AM PSTdocumented
 in this encounter
 
 Plan of Treatment
 Not on filedocumented as of this encounter
 
 Visit Diagnoses
 Not on filedocumented in this encounter

## 2020-06-03 NOTE — XMS
Encounter Summary
  Created on: 2020
 
 Isak Hilario
 External Reference #: [id number]
 : 30
 Sex: Male
 
 Demographics
 
 
+-----------------------+------------------------+
| Address               | [address]       |
|                       | [place]  [address]   |
+-----------------------+------------------------+
| Home Phone            | [phone]        |
+-----------------------+------------------------+
| Preferred Language    | Unknown                |
+-----------------------+------------------------+
| Marital Status        |                 |
+-----------------------+------------------------+
| Adventism Affiliation | NON                    |
+-----------------------+------------------------+
| Race                  | White                  |
+-----------------------+------------------------+
| Ethnic Group          | Not  or  |
+-----------------------+------------------------+
 
 
 Author
 
 
+--------------+------------------------------+
| Author       | [facility] |
+--------------+------------------------------+
| Organization | [facility] |
+--------------+------------------------------+
| Address      | Unknown                      |
+--------------+------------------------------+
| Phone        | Unavailable                  |
+--------------+------------------------------+
 
 
 
 Support
 
 
+-------------------+--------------+-------------------+-----------------+
| Name              | Relationship | Address           | Phone           |
+-------------------+--------------+-------------------+-----------------+
| Jackie Hilario | ECON         | [address]      | [phone] |
|                   |              | [place], [place]   |                 |
|                   |              | [address]             |                 |
+-------------------+--------------+-------------------+-----------------+
 
 
 
 Care Team Providers
 
 
 
+-----------------------+------+-------------+
| Care Team Member Name | Role | Phone       |
+-----------------------+------+-------------+
 PCP  | Unavailable |
+-----------------------+------+-------------+
 
 
 
 Encounter Details
 
 
+--------+-------------+----------------------+----------------------+------------------+
| Date   | Type        | Department           | Care Team            | Description      |
+--------+-------------+----------------------+----------------------+------------------+
| 05/15/ | Discharge   |   Allergy Clinic at  |   Summary, Discharge | D/C Summary ODDS |
|    | Summary-Tra | [facility]  [address]         |                      |                  |
|        | nscribed    | [address]  [place]   |                      |                  |
|        |             | [place]         |                      |                  |
|        |             | [facility], 7th floor  |                      |                  |
|        |             |  [place], [place]        |                      |                  |
|        |             | [address]           |                      |                  |
|        |             | [phone]         |                      |                  |
+--------+-------------+----------------------+----------------------+------------------+
 
 
 
 Social History
 
 
+----------------+-------+-----------+--------+------+
| Tobacco Use    | Types | Packs/Day | Years  | Date |
|                |       |           | Used   |      |
+----------------+-------+-----------+--------+------+
| Never Assessed |       |           |        |      |
+----------------+-------+-----------+--------+------+
 
 
 
+------------------+---------------+
| Sex Assigned at  | Date Recorded |
| Birth            |               |
+------------------+---------------+
| Not on file      |               |
+------------------+---------------+
 
 
 
+----------------+-------------+-------------+
| Job Start Date | Occupation  | Industry    |
+----------------+-------------+-------------+
| Not on file    | Not on file | Not on file |
+----------------+-------------+-------------+
 
 
 
+----------------+--------------+------------+
| Travel History | Travel Start | Travel End |
+----------------+--------------+------------+
 
 
 
 
+-------------------------------------+
| No recent travel history available. |
+-------------------------------------+
 documented as of this encounter
 
 Discharge Summaries
 Interface, Transcription In - 2007  3:05 AM PST  [address] [address] [phone]
   [facility]
  
  MEDICAL SUMMARY OF HOSPITALIZATION
  
  Med Rec No.: [id number] Admission Date: 98
  
  Name: Isak Hilario Discharge Date: 05/15/98
  
  
  STAFF PHYSICIAN: [facility]
   Chief, Urologic Oncology
   , Surgery
  
  PRINCIPAL FINAL DIAGNOSIS: Prostate carcinoma.
  
  PRINCIPAL PROCEDURE: Radical retropubic prostatectomy and
   bilateral pelvic lymph node dissection.
  
  REASON FOR ADMISSION: The patient is a 67-year-old male with
   elevated prostatic-specific antigen and
   abnormal rectal examination in
  addition to prostate biopsies significant for adenocarcinoma in both lobes
  of the prostate, with a high Vaishali grade of 3+3/10. He had previously
  undergone follow up seminal vesicle biopsies which were negative. He
  preferred to undergo a radical retropubic prostatectomy. Intraoperative
  frozen sections from a pelvic lymph node dissection were negative.
  
  HOSPITAL COURSE: The patient was admitted postoperatively
   from the above procedure which he tolerated
   very well, with the frozen
  sections of the lymph nodes obviously negative, given that he also underwent
  the radical retropubic prostatectomy. He was start don sips on
  postoperative day one and advanced to clears and a regular diet by
  postoperative day two. He remained afebrile with stable vital signs and
  minimal output from his Kevin-Mendieta drain which was also discontinued on
  the morning of postoperative day two.
  
  The patient is being discharged to home on postoperative day two. He was
  tolerating a regular diet, passing gas and having undergone leg bag and
  night bag instructions for Soto catheter management.
  
  CONDITION ON DISCHARGE: Stable.
  
  DISCHARGE MEDICATION(S): Tylox 1-2 tablets q. 6 h. p.r.n.
   Colace 100 mg p.o. b.i.d.
   Bactrim DS 1 tablet p.o. b.i.d., to be taken
   after the catheter is removed.
  
 
  DISCHARGE INSTRUCTION(S): Discharge Activity: His activity is
   restricted to no lifting of greater than 10
   pounds.
  
   Discharge Diet: Resume a normal
   preoperative diet.
  
   Follow-up Recommendations:
   1. Follow up with Doctor Kelvin Hernandez in
   Urology in 8 weeks.
   2. He will follow up with his referring
   physician in two weeks for catheter
   removal.
  
  
  
  
  
  Win Sidhu M.D.
  Resident, Urology
  [facility]
  Chief, Urologic Oncology
  , SurgeryAMINAHK/rb
  D: 05/15/98
  T: 98 12:56 P
  
  cc:
  
  
  
  
  JUAN CARLOS OLIVEIRA MD
  [address]
  [place] [address]
   Electronically signed by Interface, Transcription In at 2007  3:05 AM PSTdocumented
 in this encounter
 
 Plan of Treatment
 Not on filedocumented as of this encounter
 
 Visit Diagnoses
 Not on filedocumented in this encounter

## 2020-06-04 NOTE — NUR
PT HERE WITH WEAKNESS AND BARDYCARDIA. CYANOSIS EPISODE THIS SHIFT WHEN
ATTEMPTING TO STAND. PT ON BEDREST SINCE THIS TIME. PT TOLERATING REGULAR
DIET WITH MODERATE APPTITIE. BAH CATHER PLACED WITH DIFFICULTY THIS SHIFT,
YELLOW URINE AT FIRST THEN RED FADING TO PINK. BELLADONA ORDERED FOR BAH
CATHER RELATED PAIN, GIVEN X1 THIS SHIFT. HEART RATE 30-40'S THIS SHIFT. IV
POTASSIUM GIVEN THIS SHIFT. ECHO DONE THIS SHIFT PER FAMILY PREFERENCE. PT
DENYING PAIN AND NAUSEA BUT FOR GAS PAIN ANF CATHETER DISCOMFORT. BAH
QUANTITY SUFFICIENT. PT DOES NOT USE CALL LIGHT. FAMILY AT BEDSIDE.

## 2020-06-04 NOTE — NUR
MEDICATION ORDERS PLACED. CHARGE NURSE REPORTS TO THIS RN THAT SHE WAS GETTING
PT UP TO PEE AND PT HAD EPISODE OF CYANOSIS. PT BACK TO BED AT THIS TIME. HR
=30-40'S. PT DENIES PAIN AND NAUSEA. ENGAGING IN  CONVERSATION BUT DOSES OFF
FREQUENTLY. MEDICAITON GIVEN. PT SWALLOWS WITHOUT DIFFICULTY. HOB ELEVATED FOR
ASPIRATION PRECAUTIONS. BED RAILS UP. BED ALARM ON. CALL LIGHT WITHIN REACH.
PT EASILY VIEWED FROM NURSES STATION.

## 2020-06-04 NOTE — NUR
RECEIVED REPORT FROM NEO BENNETT RN, WILL ASSUME CARE OF PT AT THIS TIME.
HE IS AWAKE IN BED AND CNA APRIL IS IN ROOM TAKING VS.

## 2020-06-04 NOTE — NUR
PATIENT AWAKE IN BED. ASSISTED NURSE EVAN WITH TRANSFER TO CHAIR USING
HAYDEN. CALL LIGHT IN REACH. PATIENT IS COMFORTABLE AND DOESNT NEED ANYTHING AT
THIS TIME.

## 2020-06-04 NOTE — NUR
PT RESTING IN BED. VITALS TAKEN. BAH EMPTIED OF 175ML PINK URINE. PT REPORTS
THE PAIN IN HIS PENIS "IS BETTER, IT JUST COMES AND GOES NOW." WATER REFILLED.
PT CONTINUES RESTING, CALL LIGHT WITHIN REACH. FAMILY AT BEDSIDE.

## 2020-06-04 NOTE — NUR
PTS FAMILY TO NURSES STATION STATING PT NEEDS TO HAVE A BOWEL MOVEMENT. PT
STAES "I WANT TO STAND UP AND GO ACROSS THE GUZMAN." EDUCATION DONE WITH PT
REGARDING SAFETY. BED PAN PLACED. PT UNABLE TO HAVE BOWEL MOVEMENT. BED PAN
REMOVED. ELSY CARE DONE. DEPENDS IN PLACE. PT RESTING IN BED. BED RAILS UP.
CALL LIGHT WITHIN REACH.

## 2020-06-04 NOTE — NUR
PER REQUEST OF FAMILY MEMBERS OF PT I GAVE THEM TWO PILLOWS AND TWO BALNKETS.
THEY NEED NOTHING MORE AT THIS TIME.

## 2020-06-04 NOTE — NUR
PT EATTING HIS SECOND PUDDING SINCE ADMISSION.  ASKED TO THIS NURSE, IS THAT
THE KIDS ON THE PORCH?  ORIENTATED HIM BACK TO HIS SURROUNDINGS.

## 2020-06-04 NOTE — NUR
REPORT RECEIVED FROM SANTOSH WESLEY. PT RESTING IN BED WATCHING TV. PT DENIES PAIN
AND NAUSEA. PT ANTICIPATING BREAKFAST. BED RAILS UP. CALL LIGHT WITHIN REACH.
NO ADDITIONAL REQUESTS OR COMPLAINTS AT THIS TIME.

## 2020-06-04 NOTE — NUR
AFTERNOON ASSESSMENT AND MEDICATION DUE. PT RESTING IN BED. MD UPDATED
REGARDING PTS URIN COLOR. URINE NOW A LIGHT PINK, LESS RED THAN BEFORE. NO NEW
ORDERS FROM MD AT THIS TIME. WILL CONTINUE TO MONITOR. EDUCATION AND UPDATE
DONE WITH FAMILY. VITALS TAKEN. ASSESSMENT DONE. PT STATES "[place], VISITING MY GIRLFRIEND." PT UNABLE TO STATE WHERE HE IS OR WHY HE IS AT
THE HOSPITAL. PT ABLE TO STATE HIS NAME AND BIRTHDAY AS WELL AS RECOGNIZE
FAMILY MEMBERS. APICAL HEART RATE TAKEN = 38 BMP. MD AWARE. LUNG SOUNDS CLEAR.
BOWEL TONES HEARD. PT DENIES PAIN AND NAUSEA. +2 EDEMA TO RLE CONTINUES
PT WATCHING TV. CALL LIGHT WITHIN REACH. FAMILY AT BEDSIDE. BED RAILS UP.

## 2020-06-04 NOTE — NUR
RECEIVED PC FROM PT DTR KASHMIR.  INQUIRED OF PT BASELINE.  PT USUAL HABITS IS UP
AMBULATING WITH WALKER, TO AND FROM MEALS, INDEPENDENTLY TOILETING, WEARS
BRIEF FOR ACCIDENTS.  THIS WEEK PT HAS NOT BEEN ABLE TO AMBULATE, TOOK MEALS
IN HIS ROOM.  FORGETFUL, TALKING ABOUT SERVICE BUT WAS NEVER IN THE SERVICE.
STAFF DRESSED PT.  WHILE SHE IS AWARE OF NO VISITORS SHE REQUESTED TO BE ABLE
TO STAY WITH HER DAD IF HE GETS ANXIOUS.  WILL DISCUSS WITH SUPERVISOR.
REASSURED DAUGHTER, IF PT COMFORT CARE, FAMILY WOULD BE ALLOWED IN.

## 2020-06-04 NOTE — NUR
APPROX 15 MIN AGO, 2 STAFF RN'S INTO PT ROOM, MAX ASSIST TO GET OUT OF BED,
WITH MAX CUEING TO ATTEMPT TO HAVE PT VOID.  BEFORE THE URINAL WAS PLACED, PT
VOIDED XL AMOUNT FLOOR, AND INTO ATTENDS.  STRONG FLOW.  ONCE COMPLETE, WITH
ASSIST OF A 3RD STAFF PERSON TO PLACE ATTENDS, WHILE 2 STAFF STEADIED HIM.
REST BREAK PROVIDED, PT COMPLAINED OF FEELING VERY WEAK.  ATTEMPTED TO HAVE PT
STAND AND MOVE FURTHER UP TO HEAD OF BED, ABLE TO TAKE 2 STEPS BEFORE
COLLASPING ONTO THE BED.  WITH MAX ASSIST 3, STAFF REPOSITIONED PT.  PT STATES
THAT THE ONLY WAY HE CAN PEE IS "STANDING".  LAST DOSE KCL IV INFUSING.  RAC
IV SITE UNREMARKABLE.  PT TAKING MULT DRINKS WATER ON HIS OWN.  (USED WALKER
IN PROCESS OF STANDING AND ATTEMPTING TO MOVE TOWARD HEAD OF BED).

## 2020-06-04 NOTE — NUR
2 pa assist to bsc after failed attempt to use the urinal.  Pt required
moderate assist to the bsc with fww, wanted to "stand"  unable to ambulate to
the bathroom, attempted to have him use the urinal standing up.  After min or
so, pt suddenly started to sit down on the commode without warning.  Required
max assist with cueing and another staff member to moved the bed so staff
could get him safely on the bed.  Required max assist to get feet into bed,
adjust position in bed.  Potassium infusing per order, pt finishing his second
cup water since admission.

## 2020-06-04 NOTE — NUR
THIS RN TO ROOM TO CHECK ON PT. YISEL, PTS FAMILY  ARRIVED. YISEL UPDATED AND
STATES HER QUESTIONS HAVE BEEN ANSWERED. PT RESTING IN BED. DENIES PAIN AND
NAUSEA. MD TO BEDSIDE FOR ROUNDS. PTS SON ON PHONE DURING ROUNDS AND STATES PT
WAS TAKING METOLAZONE. FAMILY IN AGREEMENT WITH PLAN OF CARE AND STATE THEIR
QUESTIONS HAVE BEEN ANSWERED. PT RESTING IN BED. POTASSIUM INFUSING. BED
RAILSUP. CALL LIGHT WITHIN REACH. YISEL AT BEDSIDE.

## 2020-06-04 NOTE — NUR
PATIENT HAND OFF REPORT FROM LUPILLO GORDON. PATIENT RESTING QUIETLY IN BE,
DENIES PAIN AND HAS 2 DAUGHTERS AT BEDS SIDE. NO NEEDS AT THIS TIME. CALL
LIGHT IN REACH.

## 2020-06-04 NOTE — NUR
MORNING ASSESSMENT DUE. PT ANTICIPATING BREAKFAST. PT DENIES PAIN AND NAUSEA.
HAYDEN LIFT UP TO CHAIR. PT UP TO STAND WITH 3PA AND FWW TO VOID. UNABLE TO
VOID AT THIS TIME. ELSY CARE DONE. FRESH DEPENDS IN PLACE. BARRIER CREAM
APPLIED TO REDDNESS AROUND ELSY AREA. LUNG SOUNDS CLEAR. PT CONTINUES TO BE
BRADYCARDIC WITH APICAL PULSE OF 39, IRREGULAR HR NOTED. +2 EDEMA TO RLE, +1
TO LEFT LOWER EXTREMITY. HEEL PROTECTORS INPLACE. PT STATES "I DON'T WANT ANY
CARE FROM THE DOCTOR, I JUST WANT TO GO HOME." PTS WISHES ACKNOWLEDGED. PT
RESTING IN CHAIR WITH EYES CLOSED. WATER WITHIN REACH. CALL LIGHT WITHIN
REACH. NO ADDITIONAL REQUESTS OR COMPLAINTS. PT EASILY VIEWED FROM NURSES
STATION.

## 2020-06-04 NOTE — NUR
Notified by Laila at [facility] son is wanting Hospice from [facility].  Updated
daughter had requested [facility] and I have sent the chart.  Went and spoke with
Tereza and she states she had requested [facility] as she has a friend who works
there.  They would prefer [facility] as this is [facility] preference.  Informed I
will send the chart to [facility] and cancel [facility].  Called Autumn and attempted to
fax chart.  Unable to send.  Chart scanned to [facility].  Called and
left a message of where I sent the chart.

## 2020-06-04 NOTE — NUR
PT IS CONFUSED AND WORDS ARE NOT MAKING SENSE. HE DENIES NEEDS AT THIS TIME HE
IS CLOSE TO NURSES STATION AND CALL LIGHT IS CLOSE.

## 2020-06-04 NOTE — NUR
PATIENT'S FAMILY JUST CAME RUNNING OUT OF THE ROOM ASKING FOR HELP. PATIENT
WAS A LITTLE RED IN THE FACE APPEAREDD CONFUSED AND UNCOMFORTABLE AND REACHING
FOR THE CEILING. HELPED CALM THE PATIENT AND GOT VS, WHICH WERE BETTER THAN
HIS BASE LINE AT BEGINING OF THE SHIFT. PATIENT FAMILY INFORMED ME HE HAD SAID
THINGS GOT A LITTLE BLURRY AND HE HAD SOME ABD DISCOMFORT, BUT THESE THINGS
WERE NOW GONE AND HE WAS TO WHERE HE WAS BEFORE. FAMILY BACK IN VISITING AND
WILL CALL IF ANY FURTHER PROBLEMS.

## 2020-06-04 NOTE — NUR
CNA CALLS THIS RN TO BEDSIDE. PT AND FAMILY CONCERNED BECAUSE PT STATES HE
NEEDS TO HAVE A BOWEL MOVMENT AND WANTS TO GET UP TO THE COMODE/TOILET.
EDUCATION DONE REGARDING PTS BRADYCARDIA AND AMBULATION. PT AND FAMILY
VERBALZE RISK AND AGREE TO A BEDPAN. BEDPAN PLACED. PT PASSESS GAS, NO BOWEL
MOVMENT NOTED. 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
SECOND. PT BLANKS OUT DURING THIS TIME. PT COMES BACK AND REPORTS "I THINK I
WAS OUT THERE MAYBE I HAD A STROKE." MD NOTIFIED. PT DENIES PAIN AND NAUSEA.
PT LYING FLAT AND REPORTS "FEELING BETTER." DAUGHTER AT BEDSIDE. PT ALLOWED TO
REST.

## 2020-06-04 NOTE — NUR
CNA CALLS THIS RN TO BEDSIDE. REPORTS PT WAS EATING DINNER AND "HAD ANOTHER
EPISODE" OF CYANOSIS AND LIMB SHAKING.
PT ALSO BLANKS OUT DURING THIS TIME. PT COMES BACK AND REPORTS "I WAS OUT
AGAIN LIKE I WAS LOOKING IN A MIRROR AND SEEING EVERYTING BUT WASN'T THERE."
MD NOTIFIED. NO NEW ORDERS AT THIS TIME.
 PT DENIES PAIN AND NAUSEA. PT REPORTS HIS ONLY DISCOMFORT IS WITH
THE CATHETER AND "GAS PAIN." PT DECLINES OFFERES FOR STOOL SOFTENER OR GAS-X.
PT LYING FLAT AGAIN AND REPORTS FEELIGN BETTER. DAUGTHER AT BEDSIDE AND
REPORTS HER SISTER IS ON HER WAY. CALL LIGHT WITHIN REACH. BED RAILS UP.

## 2020-06-04 NOTE — NUR
NOON ASSESSMENT AND INTERVENTIONS DUE. BAH CATHETER PLACED PER PROTOCOL. 14
FENCH COUDE CATHETER PLACED WITH 10ML BALOON. 450ML CLEAR YELLOW URINE
RETRIEVED WITH CATHETER PLACEMENT. ASSESSMENT DONE. LUNG SOUNDS CLEAR. HR = 41
WITH O2 OF 97%. PT CONFUSED, DISORIENTED TO PLACE, EVENTS, TIME, AND DATE. PT
ORIENTED TO SELF AND FAMILY AT BEDSIDE. EDEMA CONTINUES TO LOWER EXTREMITIES.
NEW IV STARTED PER PROTOCOL IN LEFT FORARM, BRISK BLOOD RETURN NOTED. IV
FLUIDS STARTED (SEE MAR), POTASSIUM INFUSION COMPLETE. FIELD START IV DC'D PER
PROTOCOL GAUZE AND COBAN APPLIED. LUNCH ORDER PLACED FOR PT AND FAMILY. PT
EATING LUNCH. NO ADDITIONAL REQUESTS OR COMPLAINTS. CALL LIGHT WITHIN REACH.
BED RAILS UP. BED ALARM ON.

## 2020-06-04 NOTE — NUR
2 STAFF ADJUSTED PT AGAIN, UP IN BED  ELEVATED FEET, HPB ELEVATED.  PT NARGISERS
OUT OCCASSIONALLY "JOSH"

## 2020-06-04 NOTE — NUR
0830  Pt was discussed in IDT with Dr. Winters.  Will speak with daughter when
she arrives.
 
1040 Spoke
with daughter and received call from Dr Winters requesting Hospice consult
on behalf of family.  They are unsure if pt will return to [facility]
or return to his home with family and caregivers.  They are attempting to make
a decision.  Informed I will call [facility] as requested and ask them to call Tereza
[phone]. [phone] Chart faxed to [facility].  Left message for Sade, family would like a
consult and gave the number for Tereza.

## 2020-06-04 NOTE — NUR
PATIENT STOOD TO VOID WITH 3 PERSON ASSIST, VOIDED, BECAME SYANOTIC AND
GASPING, PLACED IN CHAIR, TRANSFERRED TO BED WITH BED ALARM ON.  DR. FLEMING AND
NURSE EVAN NOTIFIED.  PATIENT HAS PULSE AT 38

## 2020-06-04 NOTE — NUR
IV PUMP ALARMING, NEW BAG IV FLUIDS HUNG AND INFUSING AT 75MLS/HR. SITE WNL.
NO FURTHER NEEDS, CALL LIGHT IN REACH. FAMILY IN ROOM.

## 2020-06-04 NOTE — NUR
WITH THE HELP OF LUPILLO LARSON WE BOOSTED HIM UP IN HIS BED. FRESH ICE WATER
GIVEN. PT NEEDS NOTHING MORE AT THIS TIME.

## 2020-06-04 NOTE — NUR
PTS FAMILY TO NURSES STATION STATING PT CONTINUES TO HAVE DISCOMFORT WITH
BAH CATHETER.
MD CALLED REGARDING PTS DISCOMFORT WITH BAH CATHETER. CONSIDERING
BELLADONNA. NO NEW ORDERS AT THIS TIME. PT REPOSITOINED. BAH DRAINING TO
GRAVITY.

## 2020-06-04 NOTE — NUR
PTS DAUGHTER KASHMIR ARRIVED. KASHMIR UPDATED ON PTS CONDITION AND STATES HER
QUESTIONS HAVE BEEN ANSWERED. PT RESTING ON BACK IN SUPIN POSITOIN. DENIES
PAIN AND NAUSEA. CALL LIGHT WITHIN REACH.

## 2020-06-04 NOTE — NUR
MEDICATION ORDERED FOR PTS DISCOMFORT WITH CATHETER. GIVEN AS ORDERED. PT
SHOWING INCREASED CONFUION. DIORIENTED TO ALL BUT SELF AND FAMILY. PT
SENTENCES DISORDERED "WHATS GOING TO HAPPEN IN THE INSURANCE WORLD?" "CAN'T
YOU TALK." "IS IT A 9 OR A 6." PT TALKS TO PEOPLE WHO ARE NOT PRESENT. EASILY
REORIENTED. PT RESTING IN BED. BED RAILS UP. FAMILY AT BEDSIDE. CALL LIGHT
WITHIN REACH.

## 2020-06-04 NOTE — NUR
DR FLEMING NOTIFIED OF PT BLADDER SCAN GREATER THAN 500, INABILITY TO USE
URINAL, AND INABILITY TO VOID WHEN UP.  ORDERS RECEIVED.

## 2020-06-05 NOTE — NUR
IV PUMP ALARMING. IV FLUIDS STOPPED PER VERBAL ORDER FROM DR. SU.
PATIENT WITHOUT SIGNS OF PAIN OR DISCOMFORT AT THIS TIME. FAMILY MEMBER
REMAINS AT BEDSIDE AT THIS TIME. ALLOWED TO REST AT THIS TIME. SIDE RAILS UP
X2. URINE CLEARING, BUT STILL BLOOD TINGED AT THIS TIME.

## 2020-06-05 NOTE — NUR
UPDATED STAFF AND FAMILY ON POSSIBLE 330PM TRANSFER TO FACILITY.  CHARGE RN
WILL ARRANGE NON-EMERGENT EMS.  SHE ALSO IS TALKING WITH PHARMACY REGARDING
PARTIAL FILL MEDICATION TO SEND.  SHE UNDERSTANDS WILL WILL SEND HIM ON
PORTABLE OXYGEN.

## 2020-06-05 NOTE — NUR
SPOKE WITH CAROLE FROM [facility]  WHO STATES SHE IS COMING TO OUR FACILITY
AND MEETING WITH FAMILY AT 1230 TO DISCUSS THE HOSPICE PROGRAM PER FAMILY
REQUEST.  ARRANGED FOR THEM ALL TO MEET IN ED QUIET ROOM WITH NURSING
SUPERVISOR.

## 2020-06-05 NOTE — NUR
CHANGED PT'S ATTEND DUE TO A SMALL SMEAR OF BM. REPOSITIONED PT UP IN BED.
BEDSIDE TABLE AND CALL LIGHT IN [facility]. 2 FAMILY MEMBERS ARE IN THE ROOM, THEY
NEED NOTHING MORE AT THIS TIME.

## 2020-06-05 NOTE — NUR
DR. SU AWARE OF LOW BLOOD PRESSURES AND HEART RATE. PATIENT HAS BEEN
CALMER, BUT STILL HAS NOT SLEPT SINCE HIS FIRST 2MG OF MORPHINE, AND THE
FAMILY REQUEST THIS REPEATED SO 2 MOR MG GIVEN IV AND PATIENT BOOSTED UP IN
BED. CALL LIGHT IN REACH. DAUGHTERS IN ROOM.

## 2020-06-05 NOTE — NUR
RECEIVED A CALL FROM CAROLE AT [facility] [phone].  SHE STATES PATIENT
HAS BEEN ACCEPTED AND THEY HAVE SPOKEN TO [facility] WHO STATE THEY
COULD ACCEPT PATIENT BACK TODAY.  HOSPICE WILL CALL FAMILY DIRECTLY TO DISCUSS
HIS NEEDS OF EQUIPMENT.  SHE STATES THEY CAN ADMIT HIM AROUND 1-2PM TODAY AT
[facility].  CM WILL CONTINUE TO FOLLOW.  STAFF UPDATED.

## 2020-06-05 NOTE — NUR
RECIEVED REPORT FROM LUPLILO WALDROP. FAMILY CONCERNED ABOUT PATIENT RESTLESSNESS
AND REQUESTING MEDICATION. UPON ENTERING ROOM, PATIENT RESTLESS, DAUGHTERS AT
BEDSIDE. ASSESSMENT COMPLETED. MEDICATION GIVEN AS PRESCRIBED. PATIENT ALLOWED
TO REST AFTER. BED RAILS UP X2, FAMILY AT BEDSIDE.

## 2020-06-05 NOTE — NUR
PATIENT IS GETTINNG A LITTLE AGITATED AND FAMILY IS WANTING SOMETHING TO HELP
HIM BE MORE CALM AND HOPEFULLY SLEEP. CALLED DR. SU AND SHE IS PUTTING IN
ORDERS FOR SOME ATIVAN.

## 2020-06-05 NOTE — NUR
PATIENT A LITTLE CALMER NOW AFTER 1MG IV ATIVAN AND CHANGING HIS ATTENDS.
PATIENT ALSO SUCKED ON AN ORAL SWAB AND TOOK IN A FEW ORAL ICE CHIPS. TWO
DAUGHTERS REMAIN AT BEDSIDE. CALL LIGHT IN REACH.

## 2020-06-05 NOTE — NUR
PT CALM, RESTING, EYES CLOSED, NO DISTRESS. DR SU NOTIFIED OF NSG JUDGEMENT
TO HOLD AM LABS UNTIL PT IS AWAKE, STATED OK. FAMILY ROOMING

## 2020-06-05 NOTE — NUR
PATIENT REALLY HAS NOT SLEPT ALL NIGHT. PATIENT HAS BEEN ANXIOUS, AND FAMILY
HAS HAD ME CALL  X2 FOR MEDS TO TRY AND HELP WITH THIS. 1/2 MF OF
ATIVAN IV MADE PATIENT MORE NERVOUS, AND 2MG OF IV MORPHINE HAS BEEN GIVEN X2.
WARM BLANKETS, MOUTH SWABS, ICE CHIPS, AND READJUSTING PATIENT IN BED, STILL
HAS NOT ALLOWED HIM TO SLEEP. HE IS CALMER THAN HE WAS. MORE CONFUSED NOW
PROBABLY DUE TO MEDS. MD AWARE OF SOFT B/P AND BRADICARDIA. PATIENT'S 2
DAUGHTERS REMAIN AT BEDSIDE.

## 2020-06-05 NOTE — NUR
PATIENT CONTINUES WITH RESTLESSNESS AT THIS TIME. FAMILY REQUESTS MORE PAIN
MEDICATION. MEDICATION GIVEN AS PRESCRIBED. ATTEMPT TO REPLACE O2 PER FAMILY
REQUEST. PATIENT PULLS OFF AT THIS TIME. DENY OTHER NEEDS.

## 2020-06-05 NOTE — NUR
FAMILY IS BACK IN ROOM AND REQUESTING TO TALK WITH DR SU AND MYSELF.  MET
WITH FAMILY AND DR SU.  QUESTIONS ANSWERED REGARDING TIMING ON WHEN TO MOVE
PATIENT TO [facility] FOR HOSPICE CARE.  AFTER A DISCUSSION THEY HAVE
DECIDED TO GO AHEAD WITH DISCHARGE AND TRANSFER THIS AFTERNOON.  DR SU WILL
WRITE DISCHARGE.  I STAYED AND WENT OVER MEDICARE BENEFITS AND DETAILS OF HOW
WE WILL SET UP THE DISCHARGE AND TRANSFER TO FACILITY.  QUESTIONS ANSWERED.
 
CALLED CAROLE FROM [facility] HOSPICE PROGRAM.  SHE STATES SHE WILL BE MEETING PATIENT
AT FACILITY.  SHE IS ARRANGING FOR OXYGEN SET UP TO BE DELIVERED TO FACILITY,
SHE REQUESTS THAT PATIENT BE DISCHARGED AND TRANSFERRED AROUND 330PM.  SHE IS
TRYING TO GET MEDICATIONS BROUGHT FROM [place], SHE STATES IF SHE CAN'T GET
IT FILLED BEFORE HE COMES, SHE IS REQUESTED HE BE SENT HOME WITH AT LEAST ONE
DOSE FOR FACILITY TO GIVE IF NEEDED.  DISCUSSED I WILL TALK WITH CHARGE RN TO
LOOK INTO THIS.
 
CALLED [facility].  SPOKE WITH [employer] WHO STATES THEY HAVE BEEN
TALKING WITH HOSPICE AND THEY ARE WILLING TO ACCEPT HIM WHEN HE IS DISCHARGED.
PATIENT HAS HOSPITAL BED IN ROOM, HE STATES HOSPICE IS GOING TO ARRANGE
OXYGEN.  HE HAS NO QUESTIONS.

## 2020-06-05 NOTE — NUR
HAD MESSAGE FROM [place]  AT FACILITY FOR AN UPDATE ON PATIENTS
PLAN.  RETURNED CALL, SHE STATES SHE SPOKE WITH [facility] AND WAS UPDATED.  SHE
HAD NO FURTHER QUESTIONS.

## 2020-06-05 NOTE — NUR
EMS HERE TO TAKE PATIENT TO [facility].  CALLED [facility]
AND SPOKE WITH [employer] TO LET HIM KNOW PATIENT WILL BE ARRIVING SOON.
 
CALLED CAROLE FROM [facility] TO ALERT HER THAT PATIENT WILL BE LEAVING FACILITY.
DISCUSSED THAT A FEW DOSES OF MORPHINE WAS DISPENSED WITH EMS.  SHE STATES SHE
IS AT FACILITY AND WILL BE THERE TO ACCEPT PATIENT.  SHE STATES HER DME WILL
BE THERE WITHIN 1/2 HOUR FOR OXYGEN SET UP.  EMS WILL LEAVE PORTABLE BOTTLE TO
BE PICKED UP LATER BY US.  SHE STATES HER HOSPICE MEDICATIONS SHOULD BE AT
FACILITY WITHIN THE HOUR.
 
FAMILY IN ROOM WHEN EMS ARRIVED.  THEY WILL MEET PATIENT AT FACILITY.

## 2025-05-19 NOTE — XMS
Patient Returning Call    Reason for call:    Consolidate appointment with son for yellow fever vax on June 5 at 4:45 request. Seeking advice about the malaria medication, discuss strains that are resistant in [place]    Information relayed to patient:       Patient has additional questions:  No      Could we send this information to you in [employer] or would you prefer to receive a phone call?:   Patient would prefer a phone call   Okay to leave a detailed message?: Yes at Home number on file [phone] (home)   Encounter Summary
  Created on: 2020
 
 Isak Hilario
 External Reference #: [id number]
 : 30
 Sex: Male
 
 Demographics
 
 
+-----------------------+----------------------+
| Address               | [address]         |
|                       | [place]  [address] |
+-----------------------+----------------------+
| Home Phone            | [phone]      |
+-----------------------+----------------------+
| Preferred Language    | Unknown              |
+-----------------------+----------------------+
| Marital Status        |               |
+-----------------------+----------------------+
| Oriental orthodox Affiliation | Unknown              |
+-----------------------+----------------------+
| Race                  | Unknown              |
+-----------------------+----------------------+
| Ethnic Group          | Unknown              |
+-----------------------+----------------------+
 
 
 Author
 
 
+--------------+--------------------------------------------+
| Author       | [facility]  |
|              | [facility] [place]                                |
+--------------+--------------------------------------------+
| Organization | [facility]  |
|              | [facility] [place]                                |
+--------------+--------------------------------------------+
| Address      | Unknown                                    |
+--------------+--------------------------------------------+
| Phone        | Unavailable                                |
+--------------+--------------------------------------------+
 
 
 
 Support
 
 
+-------------+--------------+---------+-----------------+
| Name        | Relationship | Address | Phone           |
+-------------+--------------+---------+-----------------+
| Alessandra Saxena | ECON         | Unknown | [phone] |
+-------------+--------------+---------+-----------------+
 
 
 
 Care Team Providers
 
 
 
+-----------------------+------+-----------------+
| Care Team Member Name | Role | Phone           |
+-----------------------+------+-----------------+
| Bari Ha MD | PCP  | [phone] |
+-----------------------+------+-----------------+
 
 
 
 Reason for Visit
 
 
+----------------------+----------+
| Reason               | Comments |
+----------------------+----------+
| Coordination Of Care |          |
+----------------------+----------+
 
 
 
 Encounter Details
 
 
+--------+-----------+----------------------+--------------------+----------------------+
| Date   | Type      | Department           | Care Team          | Description          |
+--------+-----------+----------------------+--------------------+----------------------+
| / | Telephone |   [facility]  |   Peter Hess  | Coordination Of Care |
| 2020   |           | SURGERY  [address]   | [address]    |                      |
|        |           | [place]  [place], [place]  | [address]  [facility]    |                      |
|        |           | [address]           | [place] [address]    |                      |
|        |           | [phone]         | [phone]       |                      |
|        |           |                      | [phone] (Fax) |                      |
+--------+-----------+----------------------+--------------------+----------------------+
 
 
 
 Social History
 
 
+--------------+-------+-----------+--------+------+
| Tobacco Use  | Types | Packs/Day | Years  | Date |
|              |       |           | Used   |      |
+--------------+-------+-----------+--------+------+
| Never Smoker |       |           |        |      |
+--------------+-------+-----------+--------+------+
 
 
 
+---------------------+------+---+----------+
| Smokeless Tobacco:  | Chew |   | Quit:    |
| Former User         |      |   | 19 |
|                     |      |   | 85       |
+---------------------+------+---+----------+
 
 
 
+-------------+-------------+---------+----------+
| Alcohol Use | Drinks/Week | oz/Week | Comments |
+-------------+-------------+---------+----------+
 
| Not Asked   |             |         |          |
+-------------+-------------+---------+----------+
 
 
 
+------------------+---------------+
| Sex Assigned at  | Date Recorded |
| Birth            |               |
+------------------+---------------+
| Not on file      |               |
+------------------+---------------+
 
 
 
+----------------+-------------+-------------+
| Job Start Date | Occupation  | Industry    |
+----------------+-------------+-------------+
| Not on file    | Not on file | Not on file |
+----------------+-------------+-------------+
 
 
 
+----------------+--------------+------------+
| Travel History | Travel Start | Travel End |
+----------------+--------------+------------+
 
 
 
+-------------------------------------+
| No recent travel history available. |
+-------------------------------------+
 documented as of this encounter
 
 Plan of Treatment
 Not on filedocumented as of this encounter
 
 Visit Diagnoses
 Not on filedocumented in this encounter

## 2025-06-16 NOTE — XMS
Encounter Summary
  Created on: 2020
 
 Isak Hilario
 External Reference #: [id number]
 : 30
 Sex: Male
 
 Demographics
 
 
+-----------------------+------------------------+
| Address               | [address]       |
|                       | [place]  [address]   |
+-----------------------+------------------------+
| Home Phone            | [phone]        |
+-----------------------+------------------------+
| Preferred Language    | Unknown                |
+-----------------------+------------------------+
| Marital Status        |                 |
+-----------------------+------------------------+
| Sabianism Affiliation | NON                    |
+-----------------------+------------------------+
| Race                  | White                  |
+-----------------------+------------------------+
| Ethnic Group          | Not  or  |
+-----------------------+------------------------+
 
 
 Author
 
 
+--------------+------------------------------+
| Author       | [facility] |
+--------------+------------------------------+
| Organization | [facility] |
+--------------+------------------------------+
| Address      | Unknown                      |
+--------------+------------------------------+
| Phone        | Unavailable                  |
+--------------+------------------------------+
 
 
 
 Support
 
 
+-------------------+--------------+-------------------+-----------------+
| Name              | Relationship | Address           | Phone           |
+-------------------+--------------+-------------------+-----------------+
| Jackie Hilario | ECON         | [address]      | [phone] |
|                   |              | [place], [place]   |                 |
|                   |              | [address]             |                 |
+-------------------+--------------+-------------------+-----------------+
 
 
 
 Care Team Providers
 
 
 
+-----------------------+------+-------------+
| Care Team Member Name | Role | Phone       |
+-----------------------+------+-------------+
 PCP  | Unavailable |
+-----------------------+------+-------------+
 
 
 
 Encounter Details
 
 
+--------+----------+----------------------+--------------------+-------------+
| Date   | Type     | Department           | Care Team          | Description |
+--------+----------+----------------------+--------------------+-------------+
| / | Results  |   [facility]  [address]  |   [facility]   |             |
|    | Only     | [address]  | [phone]       |             |
|        |          |  [place]        |                    |             |
|        |          | [address]           |                    |             |
|        |          | [phone]         |                    |             |
+--------+----------+----------------------+--------------------+-------------+
 
 
 
 Social History
 
 
+----------------+-------+-----------+--------+------+
| Tobacco Use    | Types | Packs/Day | Years  | Date |
|                |       |           | Used   |      |
+----------------+-------+-----------+--------+------+
| Never Assessed |       |           |        |      |
+----------------+-------+-----------+--------+------+
 
 
 
+------------------+---------------+
| Sex Assigned at  | Date Recorded |
| Birth            |               |
+------------------+---------------+
| Not on file      |               |
+------------------+---------------+
 
 
 
+----------------+-------------+-------------+
| Job Start Date | Occupation  | Industry    |
+----------------+-------------+-------------+
| Not on file    | Not on file | Not on file |
+----------------+-------------+-------------+
 
 
 
+----------------+--------------+------------+
| Travel History | Travel Start | Travel End |
+----------------+--------------+------------+
 
 
 
 
+-------------------------------------+
| No recent travel history available. |
+-------------------------------------+
 documented as of this encounter
 
 Plan of Treatment
 Not on filedocumented as of this encounter
 
 Procedures
 
 
+--------------------+--------+------------+----------------------+----------------------+
| Procedure Name     | Priori | Date/Time  | Associated Diagnosis | Comments             |
|                    | ty     |            |                      |                      |
+--------------------+--------+------------+----------------------+----------------------+
| SURGICAL PATHOLOGY | Routin | 1998 |                      |   Results for this   |
|                    | e      |            |                      | procedure are in the |
|                    |        |            |                      |  results section.    |
+--------------------+--------+------------+----------------------+----------------------+
 documented in this encounter
 
 Results
 SURGICAL PATHOLOGY (1998)
 
+-----------+--------------------------+-----------+-------------+--------------+
| Component | Value                    | Ref Range | Performed   | Pathologist  |
|           |                          |           | At          | Signature    |
+-----------+--------------------------+-----------+-------------+--------------+
| SURGICAL  | SOURCE OF SPECIMEN: SEE  |           | [facility]        |              |
| PATHOLOGY | RESULTS                  |           | DEPARTMENT  |              |
|           | Preliminary              |           | OF          |              |
|           | History:CLINICAL HISTORY |           | PATHOLOGY   |              |
|           |        Patient Age: 67   |           |             |              |
|           | year old man             |           |             |              |
|           | Patient History: New     |           |             |              |
|           | diagnosis of prostate    |           |             |              |
|           | cancer, clinician wishes |           |             |              |
|           |  torule out extension    |           |             |              |
|           | into seminal vesicles.   |           |             |              |
|           |       GROSS DESCRIPTION  |           |             |              |
|           |       Specimens          |           |             |              |
|           | received:   Four in      |           |             |              |
|           | formalin       #1 RIGHT  |           |             |              |
|           | PROXIMAL SEMINAL         |           |             |              |
|           | VESICLE: The specimen    |           |             |              |
|           | consists of              |           |             |              |
|           | twofilamentous fragments |           |             |              |
|           |  of tan-white, soft      |           |             |              |
|           | tissue measuring 1.4 cm. |           |             |              |
|           |  inaggregate length by   |           |             |              |
|           | less than 0.1 cm. in     |           |             |              |
|           | diameter. The specimen   |           |             |              |
|           | iswrapped and submitted  |           |             |              |
|           | in toto in cassette 1.   |           |             |              |
|           |       #2 LEFT PROXIMAL   |           |             |              |
|           | SEMINAL VESICLE: The     |           |             |              |
|           | specimen consists of     |           |             |              |
|           | onefilamentous fragment  |           |             |              |
|           | of tan-white, soft       |           |             |              |
|           | tissue measuring 2.0 cm. |           |             |              |
 
|           |  inlength by less than   |           |             |              |
|           | 0.1 cm. in diameter. The |           |             |              |
|           |  specimen is wrapped     |           |             |              |
|           | andsubmitted in toto in  |           |             |              |
|           | cassette 2.       #3     |           |             |              |
|           | RIGHT DISTAL SEMINAL     |           |             |              |
|           | VESICLE: The specimen    |           |             |              |
|           | consists of              |           |             |              |
|           | onefilamentous fragment  |           |             |              |
|           | of tan-white, soft       |           |             |              |
|           | tissue measuring 0.7 cm. |           |             |              |
|           |  inlength by less than   |           |             |              |
|           | 0.1 cm. in diameter. The |           |             |              |
|           |  specimen is wrapped     |           |             |              |
|           | andsubmitted in toto in  |           |             |              |
|           | cassette 3.       #4     |           |             |              |
|           | LEFT DISTAL SEMINAL      |           |             |              |
|           | VESICLE: The specimen    |           |             |              |
|           | consists of              |           |             |              |
|           | threefilamentous         |           |             |              |
|           | fragments of tan-white,  |           |             |              |
|           | soft tissue measuring    |           |             |              |
|           | 1.7 cm. inaggregate      |           |             |              |
|           | length by less than 0.1  |           |             |              |
|           | cm. in diameter. The     |           |             |              |
|           | specimen iswrapped and   |           |             |              |
|           | submitted in toto in     |           |             |              |
|           | cassette 4.       All    |           |             |              |
|           | tissue sections taken    |           |             |              |
|           | are submitted for        |           |             |              |
|           | microscopic              |           |             |              |
|           | evaluation.SML/KI:tg     |           |             |              |
|           |           FINAL          |           |             |              |
|           | DIAGNOSIS#1 RIGHT        |           |             |              |
|           | PROXIMAL SEMINAL         |           |             |              |
|           | VESICLE:   SEMINAL       |           |             |              |
|           | VESICLE WITH NO          |           |             |              |
|           | DIAGNOSTIC ABNORMALITY#2 |           |             |              |
|           |  LEFT PROXIMAL SEMINAL   |           |             |              |
|           | VESICLE:   PROSTATE WITH |           |             |              |
|           |  SINGLE FOCUS OF         |           |             |              |
|           | ADENOCARCINOMA,          |           |             |              |
|           | ALINE'S GRADE II/V,    |           |             |              |
|           |    INVOLVING 10% OF      |           |             |              |
|           | CORE; NO SEMINAL VESICLE |           |             |              |
|           |  PRESENT#3 RIGHT DISTAL  |           |             |              |
|           | SEMINAL VESICLE:         |           |             |              |
|           |   SEMINAL VESICLE WITH   |           |             |              |
|           | NO DIAGNOSTIC            |           |             |              |
|           | ABNORMALITY#4 LEFT       |           |             |              |
|           | DISTAL SEMINAL VESICLE:  |           |             |              |
|           |   SEMINAL VESICLE WITH   |           |             |              |
|           | NO DIAGNOSTIC            |           |             |              |
|           | ABNORMALITY       Case   |           |             |              |
|           | reviewed by: Yudi       |           |             |              |
|           | MS Arnaud IIICase        |           |             |              |
|           | staffed by: Mitra        |           |             |              |
|           | BILL BinghamAlso seen   |           |             |              |
|           | by: Surya Gonzalez      |           |             |              |
|           | M.D.T:[id number]:catherine       My |           |             |              |
 
|           |  electronic signature    |           |             |              |
|           | indicates that I have    |           |             |              |
|           | personally reviewed      |           |             |              |
|           | alldiagnostic slides,    |           |             |              |
|           | the gross and/or         |           |             |              |
|           | microscopic portion of   |           |             |              |
|           | thisreport and           |           |             |              |
|           | formulated the final     |           |             |              |
|           | diagnosis.               |           |             |              |
+-----------+--------------------------+-----------+-------------+--------------+
 
 
 
+----------+
| Specimen |
+----------+
| Other    |
+----------+
 
 
 
+----------------------+------------------------+----------------------+--------------+
| Performing           | Address                | City/State/Zipcode   | Phone Number |
| Organization         |                        |                      |              |
+----------------------+------------------------+----------------------+--------------+
|   [facility] |   [address]  |   [place] [address] |              |
|  PATHOLOGY           | [address]                |                      |              |
+----------------------+------------------------+----------------------+--------------+
 documented in this encounter
 
 Visit Diagnoses
 Not on filedocumented in this encounter normal...